# Patient Record
Sex: FEMALE | Race: BLACK OR AFRICAN AMERICAN | NOT HISPANIC OR LATINO | Employment: OTHER | URBAN - METROPOLITAN AREA
[De-identification: names, ages, dates, MRNs, and addresses within clinical notes are randomized per-mention and may not be internally consistent; named-entity substitution may affect disease eponyms.]

---

## 2017-05-19 ENCOUNTER — HOSPITAL ENCOUNTER (OUTPATIENT)
Dept: CARDIOLOGY | Facility: OTHER | Age: 67
Discharge: HOME | End: 2017-05-19
Attending: FAMILY MEDICINE
Payer: COMMERCIAL

## 2017-05-19 ENCOUNTER — OFFICE VISIT (OUTPATIENT)
Dept: INTERNAL MEDICINE | Facility: CLINIC | Age: 67
End: 2017-05-19
Attending: FAMILY MEDICINE
Payer: COMMERCIAL

## 2017-05-19 ENCOUNTER — PATIENT MESSAGE (OUTPATIENT)
Dept: INTERNAL MEDICINE | Facility: CLINIC | Age: 67
End: 2017-05-19

## 2017-05-19 VITALS
HEART RATE: 79 BPM | OXYGEN SATURATION: 97 % | WEIGHT: 187.63 LBS | SYSTOLIC BLOOD PRESSURE: 118 MMHG | DIASTOLIC BLOOD PRESSURE: 60 MMHG | BODY MASS INDEX: 32.2 KG/M2

## 2017-05-19 DIAGNOSIS — R07.9 CHEST PAIN, UNSPECIFIED TYPE: ICD-10-CM

## 2017-05-19 DIAGNOSIS — E11.9 CONTROLLED TYPE 2 DIABETES MELLITUS WITHOUT COMPLICATION, WITHOUT LONG-TERM CURRENT USE OF INSULIN: Primary | ICD-10-CM

## 2017-05-19 DIAGNOSIS — E66.9 OBESITY, UNSPECIFIED OBESITY SEVERITY, UNSPECIFIED OBESITY TYPE: ICD-10-CM

## 2017-05-19 DIAGNOSIS — I10 ESSENTIAL HYPERTENSION: ICD-10-CM

## 2017-05-19 DIAGNOSIS — E11.9 CONTROLLED TYPE 2 DIABETES MELLITUS WITHOUT COMPLICATION, WITHOUT LONG-TERM CURRENT USE OF INSULIN: ICD-10-CM

## 2017-05-19 PROCEDURE — 99214 OFFICE O/P EST MOD 30 MIN: CPT | Mod: S$GLB,,, | Performed by: FAMILY MEDICINE

## 2017-05-19 PROCEDURE — 3074F SYST BP LT 130 MM HG: CPT | Mod: S$GLB,,, | Performed by: FAMILY MEDICINE

## 2017-05-19 PROCEDURE — 93010 ELECTROCARDIOGRAM REPORT: CPT | Mod: ,,, | Performed by: INTERNAL MEDICINE

## 2017-05-19 PROCEDURE — 99999 PR PBB SHADOW E&M-EST. PATIENT-LVL III: CPT | Mod: PBBFAC,,, | Performed by: FAMILY MEDICINE

## 2017-05-19 PROCEDURE — 4010F ACE/ARB THERAPY RXD/TAKEN: CPT | Mod: S$GLB,,, | Performed by: FAMILY MEDICINE

## 2017-05-19 PROCEDURE — 3046F HEMOGLOBIN A1C LEVEL >9.0%: CPT | Mod: 8P,S$GLB,, | Performed by: FAMILY MEDICINE

## 2017-05-19 PROCEDURE — 1160F RVW MEDS BY RX/DR IN RCRD: CPT | Mod: S$GLB,,, | Performed by: FAMILY MEDICINE

## 2017-05-19 PROCEDURE — 93005 ELECTROCARDIOGRAM TRACING: CPT

## 2017-05-19 PROCEDURE — 3078F DIAST BP <80 MM HG: CPT | Mod: S$GLB,,, | Performed by: FAMILY MEDICINE

## 2017-05-19 NOTE — PROGRESS NOTES
Subjective:      Patient ID: Jennifer Motley is a 66 y.o. female.    Chief Complaint: Chest Pain (x 1 week) and Anxiety    HPI Comments: She is here with HTN, DM with chest pain and has been present for one week. It is an intermittent numb pain, it occurs in the afternoon and it does not radiate anywhere, it lasts a few minutes. No inciting or relieving factors. She denies nausea, vomiting, diarrhea or constipation with it. She does have a history of reflux and no burning or reflux symptoms. She is taking her BP medication consistently. Her mom and  currently recovering from surgery. She is allergic to tomato sauce. She does eat a lot of hot sauce. She has had labs with her endocrinologist one month ago.     Chest Pain      Anxiety   Symptoms include chest pain.         Review of Systems   Cardiovascular: Positive for chest pain.     I personally reviewed Past Medical History, Past Surgical history,  Past Social History and Family History    Objective:   /60 (BP Location: Right arm, Patient Position: Sitting, BP Method: Manual)  Pulse 79  Wt 85.1 kg (187 lb 9.8 oz)  SpO2 97%  BMI 32.2 kg/m2    Physical Exam   Constitutional: She is oriented to person, place, and time. She appears well-developed and well-nourished. No distress.   HENT:   Head: Normocephalic.   Right Ear: External ear normal.   Left Ear: External ear normal.   Mouth/Throat: Oropharynx is clear and moist.   Eyes: Conjunctivae and EOM are normal. Pupils are equal, round, and reactive to light. Right eye exhibits no discharge. Left eye exhibits no discharge. No scleral icterus.   Neck: Normal range of motion. No tracheal deviation present. No thyromegaly present.   Cardiovascular: Normal rate, regular rhythm, normal heart sounds and intact distal pulses.  Exam reveals no gallop.    No murmur heard.  Pulmonary/Chest: Effort normal and breath sounds normal. No respiratory distress. She has no wheezes. She has no rales. She exhibits no  tenderness.   Abdominal: Soft. Bowel sounds are normal. She exhibits no distension and no mass. There is no tenderness. There is no rebound and no guarding.   Musculoskeletal: Normal range of motion.   Neurological: She is alert and oriented to person, place, and time.   Skin: Skin is warm and dry.   Psychiatric: She has a normal mood and affect. Her behavior is normal. Judgment and thought content normal.   Vitals reviewed.      Jennifer was seen today for chest pain and anxiety.    Diagnoses and all orders for this visit:    Controlled type 2 diabetes mellitus without complication, without long-term current use of insulin  -   Will check hga1c and cont metformin     Obesity, unspecified obesity severity, unspecified obesity type  -discussed diet changes     Essential hypertension  -controlled, cont current medication     Chest pain, unspecified type  -patient declined counselor or medication  -will check stress test   -discussed ER prompts   -     SCHEDULED EKG 12-LEAD (to Muse); Future  -     Exercise stress echo; Future

## 2017-05-19 NOTE — MR AVS SNAPSHOT
[facility]  [address]  [place] [address]  Phone: [phone]  Fax: [phone]                  Jennifer Motley   2017 7:00 AM   Office Visit    Description:  Female : 1950   Provider:  Jaqueline Byrnes MD   Department:  [facility]           Reason for Visit     Chest Pain     Anxiety           Diagnoses this Visit        Comments    Controlled type 2 diabetes mellitus without complication, without long-term current use of insulin    -  Primary     Obesity, unspecified obesity severity, unspecified obesity type         Essential hypertension         Chest pain, unspecified type                To Do List           Future Appointments        Provider Department Dept Phone    2017 8:00 AM CARDIOLOGY, TESTS [facility] [phone]    2017 7:45 AM EXERCISE, STRESS ECHO [facility] - Echo/Stress Lab [phone]      Goals (5 Years of Data)     None      [facility]     [facility] Nurse Care Line -  Assistance  Unless otherwise directed by your provider, please contact [facility], our nurse care line that is available for  assistance.     Registered nurses in the [facility] provide: appointment scheduling, clinical advisement, health education, and other advisory services.  Call: [phone] (toll free)               Medications           Message regarding Medications     Verify the changes and/or additions to your medication regime listed below are the same as discussed with your clinician today.  If any of these changes or additions are incorrect, please notify your healthcare provider.             Verify that the below list of medications is an accurate representation of the medications you are currently taking.  If none reported, the list may be blank. If incorrect, please contact your healthcare provider. Carry this list with you in case of emergency.           Current  Medications     aspirin 81 MG Chew Take 81 mg by mouth once daily.    calcium-vitamin D3 500 mg(1,250mg) -200 unit per tablet Take 1 tablet by mouth once daily.    cinnamon bark 500 mg capsule Take 500 mg by mouth once daily.    CRANBERRY FRUIT CONCENTRATE (AZO CRANBERRY ORAL) Take by mouth.    dorzolamide-timolol 2-0.5% (COSOPT) 22.3-6.8 mg/mL ophthalmic solution     FLAXSEED ORAL Take 1 tablet by mouth.    latanoprost 0.005 % ophthalmic solution     lisinopril-hydrochlorothiazide (PRINZIDE,ZESTORETIC) 10-12.5 mg per tablet     metformin (GLUCOPHAGE) 500 MG tablet     multivitamin capsule Take 1 capsule by mouth once daily.    trospium (SANCTURA XR) 60 mg Cp24 capsule            Clinical Reference Information           Your Vitals Were     BP Pulse Weight SpO2 BMI    118/60 (BP Location: Right arm, Patient Position: Sitting, BP Method: Manual) 79 85.1 kg (187 lb 9.8 oz) 97% 32.2 kg/m2      Blood Pressure          Most Recent Value    BP  118/60      Allergies as of 5/19/2017     No Known Allergies      Immunizations Administered on Date of Encounter - 5/19/2017     None      Orders Placed During Today's Visit     Future Labs/Procedures Expected by Expires    Exercise stress echo  As directed 5/19/2018    SCHEDULED EKG 12-LEAD (to Muse)  As directed 5/19/2018      Instructions    Your test results will be communicated to you via: [facility], Telephone or Letter.  If you have not received your test results within one week. Please contact the clinic.         Language Assistance Services     ATTENTION: Language assistance services are available, free of charge. Please call [phone].      ATENCIÓN: Si habla español, tiene a cardoso disposición servicios gratuitos de asistencia lingüística. Llame al [phone].     CHÚ Ý: N?u b?n nói Ti?ng Vi?t, có các d?ch v? h? tr? ngôn ng? mi?n phí dành cho b?n. G?i s? [phone].         [facility] complies with applicable Federal civil rights laws and does  not discriminate on the basis of race, color, national origin, age, disability, or sex.

## 2017-05-23 ENCOUNTER — PATIENT MESSAGE (OUTPATIENT)
Dept: INTERNAL MEDICINE | Facility: CLINIC | Age: 67
End: 2017-05-23

## 2017-05-23 DIAGNOSIS — E11.9 TYPE 2 DIABETES MELLITUS WITHOUT COMPLICATION: ICD-10-CM

## 2017-05-26 ENCOUNTER — HOSPITAL ENCOUNTER (OUTPATIENT)
Dept: CARDIOLOGY | Facility: CLINIC | Age: 67
Discharge: HOME | End: 2017-05-26
Payer: COMMERCIAL

## 2017-05-26 ENCOUNTER — TELEPHONE (OUTPATIENT)
Dept: INTERNAL MEDICINE | Facility: CLINIC | Age: 67
End: 2017-05-26

## 2017-05-26 DIAGNOSIS — E11.9 CONTROLLED TYPE 2 DIABETES MELLITUS WITHOUT COMPLICATION, WITHOUT LONG-TERM CURRENT USE OF INSULIN: ICD-10-CM

## 2017-05-26 DIAGNOSIS — R07.9 CHEST PAIN, UNSPECIFIED TYPE: Primary | ICD-10-CM

## 2017-05-26 DIAGNOSIS — R07.9 CHEST PAIN, UNSPECIFIED TYPE: ICD-10-CM

## 2017-05-26 DIAGNOSIS — E66.9 OBESITY, UNSPECIFIED OBESITY SEVERITY, UNSPECIFIED OBESITY TYPE: ICD-10-CM

## 2017-05-26 DIAGNOSIS — I10 ESSENTIAL HYPERTENSION: ICD-10-CM

## 2017-05-26 LAB
DIASTOLIC DYSFUNCTION: NO
RETIRED EF AND QEF - SEE NOTES: 60 (ref 55–65)

## 2017-05-26 PROCEDURE — 93351 STRESS TTE COMPLETE: CPT | Mod: S$GLB,,, | Performed by: INTERNAL MEDICINE

## 2017-05-26 PROCEDURE — 93321 DOPPLER ECHO F-UP/LMTD STD: CPT | Mod: S$GLB,,, | Performed by: INTERNAL MEDICINE

## 2017-05-26 NOTE — TELEPHONE ENCOUNTER
Informed pt of lab results and advise. Cardio appt has been made. Pt demonstrated verbal understanding of information and had no further questions or concerns at this time.

## 2017-06-07 ENCOUNTER — PATIENT MESSAGE (OUTPATIENT)
Dept: INTERNAL MEDICINE | Facility: CLINIC | Age: 67
End: 2017-06-07

## 2017-06-08 ENCOUNTER — TELEPHONE (OUTPATIENT)
Dept: INTERNAL MEDICINE | Facility: CLINIC | Age: 67
End: 2017-06-08

## 2017-06-08 DIAGNOSIS — R07.9 CHEST PAIN, UNSPECIFIED TYPE: Primary | ICD-10-CM

## 2017-06-09 NOTE — TELEPHONE ENCOUNTER
pts cardio appt has been created. Pt demonstrated verbal understanding of information and had no further questions or concerns at this time.

## 2017-06-19 ENCOUNTER — OFFICE VISIT (OUTPATIENT)
Dept: CARDIOLOGY | Facility: CLINIC | Age: 67
End: 2017-06-19
Payer: COMMERCIAL

## 2017-06-19 VITALS
WEIGHT: 189.63 LBS | SYSTOLIC BLOOD PRESSURE: 119 MMHG | HEART RATE: 63 BPM | HEIGHT: 64 IN | DIASTOLIC BLOOD PRESSURE: 59 MMHG | BODY MASS INDEX: 32.37 KG/M2

## 2017-06-19 DIAGNOSIS — I10 ESSENTIAL HYPERTENSION: ICD-10-CM

## 2017-06-19 DIAGNOSIS — E66.9 OBESITY, UNSPECIFIED OBESITY SEVERITY, UNSPECIFIED OBESITY TYPE: ICD-10-CM

## 2017-06-19 DIAGNOSIS — R07.9 CHEST PAIN, UNSPECIFIED TYPE: Primary | ICD-10-CM

## 2017-06-19 DIAGNOSIS — E11.9 CONTROLLED TYPE 2 DIABETES MELLITUS WITHOUT COMPLICATION, WITHOUT LONG-TERM CURRENT USE OF INSULIN: ICD-10-CM

## 2017-06-19 PROCEDURE — 99999 PR PBB SHADOW E&M-EST. PATIENT-LVL IV: CPT | Mod: PBBFAC,,, | Performed by: INTERNAL MEDICINE

## 2017-06-19 PROCEDURE — 4010F ACE/ARB THERAPY RXD/TAKEN: CPT | Mod: S$GLB,,, | Performed by: INTERNAL MEDICINE

## 2017-06-19 PROCEDURE — 99204 OFFICE O/P NEW MOD 45 MIN: CPT | Mod: S$GLB,,, | Performed by: INTERNAL MEDICINE

## 2017-06-19 NOTE — PROGRESS NOTES
"Subjective:   Patient ID:  Jennifer Motley is a 66 y.o. female is a new patient who presents for evaluation of Chest pain, unspecified type  HPI:   She is here with HTN, DM with chest pain and has been present for one week. It is an intermittent numb pain, it occurs in the afternoon and it does not radiate anywhere, it lasts a few minutes. No inciting or relieving factors. She denies nausea, vomiting, diarrhea or constipation with it. She does have a history of reflux and no burning or reflux symptoms. She is taking her BP medication consistently. Her mom and  currently recovering from surgery. She is allergic to tomato sauce. She does eat a lot of hot sauce. She has had labs with her endocrinologist one month ago.      Non smoker. Mother had HTN and high BP. Aunts have had MI at 50s and 60s.   Exercise stress echo  1. The EKG portion of this study is negative for ischemia at a high workload, and peak heart rate of 157 bpm (106% of predicted).   2. Blood pressure response to exercise was normal (Presenting BP: 140/57 Peak BP: 217/84).   3. No significant arrhythmias were present.   4. There were no symptoms of chest discomfort or significant dyspnea throughout the protocol.   5. The Khoury treadmill score was 3 suggesting an intermediate probability for future cardiovascular events.    1 - Normal left ventricular systolic function (EF 60-65%).     2 - Normal left ventricular diastolic function.     3 - Normal right ventricular systolic function .     No evidence of stress induced myocardial ischemia.     Patient Active Problem List   Diagnosis    Thyroid cyst    Overactive bladder    Ocular hypertension    Obese    Hypertension    Controlled type 2 diabetes mellitus without complication, without long-term current use of insulin     BP (!) 119/59 (BP Location: Left arm, Patient Position: Sitting, BP Method: Automatic)   Pulse 63   Ht 5' 4" (1.626 m)   Wt 86 kg (189 lb 9.5 oz)   BMI 32.54 kg/m²   Body " mass index is 32.54 kg/m².  CrCl cannot be calculated (No order found.).    No results found for: NA, K, CL, CO2, BUN, CREATININE, GLU, HGBA1C, MG, AST, ALT, ALBUMIN, PROT, BILITOT, WBC, HGB, HCT, MCV, PLT, INR, PSA, TSH, CHOL, HDL, LDLCALC, TRIG    Current Outpatient Prescriptions   Medication Sig    aspirin 81 MG Chew Take 81 mg by mouth once daily.    calcium-vitamin D3 500 mg(1,250mg) -200 unit per tablet Take 1 tablet by mouth once daily.    cinnamon bark 500 mg capsule Take 500 mg by mouth once daily.    CRANBERRY FRUIT CONCENTRATE (AZO CRANBERRY ORAL) Take by mouth once daily at 6am.     dorzolamide-timolol 2-0.5% (COSOPT) 22.3-6.8 mg/mL ophthalmic solution Place 1 drop into both eyes 2 (two) times daily.     FLAXSEED ORAL Take 1 tablet by mouth once daily at 6am.     latanoprost 0.005 % ophthalmic solution Place 1 drop into both eyes once daily.     lisinopril-hydrochlorothiazide (PRINZIDE,ZESTORETIC) 10-12.5 mg per tablet Take 1 tablet by mouth once daily.     metformin (GLUCOPHAGE) 500 MG tablet Take 500 mg by mouth daily with breakfast.     multivitamin capsule Take 1 capsule by mouth once daily.    trospium (SANCTURA XR) 60 mg Cp24 capsule 60 mg once daily.      No current facility-administered medications for this visit.        Review of Systems   Constitution: Negative for chills, decreased appetite, weakness, malaise/fatigue, night sweats and weight loss.   Eyes: Negative for blurred vision, double vision, visual disturbance and visual halos.   Cardiovascular: Positive for chest pain. Negative for claudication, cyanosis, dyspnea on exertion, irregular heartbeat, leg swelling, near-syncope, orthopnea, palpitations, paroxysmal nocturnal dyspnea and syncope.   Respiratory: Negative for cough, hemoptysis, snoring, sputum production and wheezing.    Endocrine: Negative for cold intolerance, heat intolerance, polydipsia and polyphagia.   Hematologic/Lymphatic: Negative for adenopathy and bleeding  problem. Does not bruise/bleed easily.   Skin: Negative for flushing, itching, poor wound healing and rash.   Musculoskeletal: Positive for arthritis and back pain. Negative for falls, gout, joint pain, joint swelling, muscle cramps, muscle weakness, myalgias, neck pain and stiffness.   Gastrointestinal: Negative for bloating, abdominal pain, anorexia, diarrhea, dysphagia, excessive appetite, flatus, hematemesis, jaundice, melena and nausea.   Genitourinary: Negative for hesitancy and incomplete emptying.   Neurological: Negative for aphonia, brief paralysis, difficulty with concentration, disturbances in coordination, excessive daytime sleepiness, dizziness, focal weakness, light-headedness and loss of balance.   Psychiatric/Behavioral: Negative for altered mental status, depression, hallucinations, hypervigilance, memory loss, substance abuse and suicidal ideas. The patient does not have insomnia and is not nervous/anxious.        Objective:   Physical Exam   Constitutional: She is oriented to person, place, and time. She appears well-developed and well-nourished. No distress.   Patient looks well.   HENT:   Head: Normocephalic and atraumatic.   Nose: Nose normal.   Mouth/Throat: Oropharynx is clear and moist. No oropharyngeal exudate.   Eyes: Conjunctivae and EOM are normal. Pupils are equal, round, and reactive to light. Right eye exhibits no discharge. Left eye exhibits no discharge. No scleral icterus.   Neck: Normal range of motion. Neck supple. No JVD present. No tracheal deviation present. No thyromegaly present.   Cardiovascular: Normal rate, regular rhythm, normal heart sounds and intact distal pulses.  Exam reveals no gallop and no friction rub.    No murmur heard.  Pulmonary/Chest: Effort normal and breath sounds normal. No stridor. No respiratory distress. She has no wheezes. She has no rales. She exhibits no tenderness.   Abdominal: Soft. Bowel sounds are normal. She exhibits no distension and no  mass. There is no tenderness. There is no rebound and no guarding.   Musculoskeletal: Normal range of motion. She exhibits no edema or tenderness.   Lymphadenopathy:     She has no cervical adenopathy.   Neurological: She is alert and oriented to person, place, and time. She has normal reflexes. No cranial nerve deficit. She exhibits normal muscle tone. Coordination normal.   Skin: Skin is warm. No rash noted. She is not diaphoretic. No erythema. No pallor.   Psychiatric: She has a normal mood and affect. Her behavior is normal. Judgment and thought content normal.       Assessment:     1. Chest pain, unspecified type    2. Controlled type 2 diabetes mellitus without complication, without long-term current use of insulin    3. Obesity, unspecified obesity severity, unspecified obesity type    4. Essential hypertension        Plan:   ASCVD risk - 7%. Recommend statin but patient wants to do dietary modification for now.   We discussed the possibility of episodes of high blood pressure an an etiology of her Chest pain and that she needs to keep a BP diary.- at peak stress BP > 200 systolic.   Counseled on importance of heart healthy diet low in saturated and trans fat and salt as well gradually starting a regular aerobic exercise regimen with goal of 30min 5x/week. Recommend BP diary. Call if systolic BP > 140 mmHg on checking repeatedly  All meds reviewed and are appropriate  Patient is compliant with his medications.    RTC PRN.

## 2017-06-19 NOTE — LETTER
June 19, 2017      Jaqueline Byrnes MD  [address]  [facility] [address]           [facility]  [address]  [place] [address]  Phone: [phone]          Patient: Jennifer Motley   MR Number: [id number]   YOB: 1950   Date of Visit: 6/19/2017       Dear Dr. Jaqueline Byrnes:    Thank you for referring Jennifer Motley to me for evaluation. Attached you will find relevant portions of my assessment and plan of care.    If you have questions, please do not hesitate to call me. I look forward to following Jennifer Motley along with you.    Sincerely,    Verónica Turk MD    Enclosure  CC:  No Recipients    If you would like to receive this communication electronically, please contact [email or web address] or [phone] to request more information on [employer] [facility] access.    For providers and/or their staff who would like to refer a patient to [facility], please contact us through our one-stop-shop provider referral line, [facility], at [phone].    If you feel you have received this communication in error or would no longer like to receive these types of communications, please e-mail [email or web address]

## 2017-07-10 DIAGNOSIS — Z12.11 COLON CANCER SCREENING: ICD-10-CM

## 2017-07-24 ENCOUNTER — PATIENT MESSAGE (OUTPATIENT)
Dept: INTERNAL MEDICINE | Facility: CLINIC | Age: 67
End: 2017-07-24

## 2017-10-20 DIAGNOSIS — Z12.11 COLON CANCER SCREENING: ICD-10-CM

## 2017-10-20 DIAGNOSIS — E11.9 TYPE 2 DIABETES MELLITUS WITHOUT COMPLICATION: ICD-10-CM

## 2017-12-01 ENCOUNTER — PATIENT MESSAGE (OUTPATIENT)
Dept: INTERNAL MEDICINE | Facility: CLINIC | Age: 67
End: 2017-12-01

## 2018-02-02 DIAGNOSIS — E11.9 TYPE 2 DIABETES MELLITUS WITHOUT COMPLICATION: ICD-10-CM

## 2018-04-03 ENCOUNTER — PATIENT OUTREACH (OUTPATIENT)
Dept: INTERNAL MEDICINE | Facility: CLINIC | Age: 68
End: 2018-04-03

## 2018-04-03 NOTE — PROGRESS NOTES
Patient was contacted to scheduled visit with Jaqueline Byrnes MD. Attempt unsuccessful,will follow up with patient at a later time.     Left message for patient to contact office to schedule appointment for DM f/u.  Letter will be mailed in efforts to contact patient.

## 2018-04-29 ENCOUNTER — TELEPHONE (OUTPATIENT)
Dept: INTERNAL MEDICINE | Facility: CLINIC | Age: 68
End: 2018-04-29

## 2018-04-30 ENCOUNTER — DOCUMENTATION ONLY (OUTPATIENT)
Dept: INTERNAL MEDICINE | Facility: CLINIC | Age: 68
End: 2018-04-30

## 2018-04-30 NOTE — TELEPHONE ENCOUNTER
Pt has been informed of advice.  States verbal understanding. Scheduled accordingly. Pt states she had annual labs completed last month and will fax results to office. Fax number provided. Informed pt FIT kit supplies and information will be sent to address on file. Patient has no further questions or concerns.

## 2018-05-08 ENCOUNTER — LAB VISIT (OUTPATIENT)
Dept: LAB | Facility: HOSPITAL | Age: 68
End: 2018-05-08
Attending: FAMILY MEDICINE
Payer: COMMERCIAL

## 2018-05-08 ENCOUNTER — PATIENT OUTREACH (OUTPATIENT)
Dept: ADMINISTRATIVE | Facility: HOSPITAL | Age: 68
End: 2018-05-08

## 2018-05-08 DIAGNOSIS — Z12.11 COLON CANCER SCREENING: ICD-10-CM

## 2018-05-08 PROCEDURE — 82274 ASSAY TEST FOR BLOOD FECAL: CPT

## 2018-05-08 NOTE — PROGRESS NOTES
[facility] is committed to your overall health.  To help you get the most out of each of your visits, we will review your information to make sure you are up to date on all of your recommended tests and/or procedures.       Your PCP  Jaqueline Byrnes MD   found that you may be due for:       Health Maintenance Due   Topic Date Due    Hepatitis C Screening  1950    Hemoglobin A1c  1950    Fecal Occult Blood Test (FOBT)/FitKit  1950    Eye Exam  11/15/1961    Low Dose Statin  11/15/1972    Zoster Vaccine  11/15/2011    Lipid Panel  10/01/2017    Foot Exam  11/21/2017    Pneumococcal (65+) (2 of 2 - PPSV23) 12/02/2017    DEXA SCAN  06/01/2018    Mammogram  07/01/2018     You will be scheduled for a eye cam retina scan on the same day of your scheduled visit with your Jaqueline Byrnes MD.  NO eye drop dilation will take place. You can drive after the scan.  This will take no longer than ten minutes. This will take place in the same office area as your visit. This eye scan is NOT a visual exam. This exam is being used to scan for diseases of the eye such as Diabetic Retinopathy which is the leading cause for blindness in those that have Diabetes Mellitus.     If you feel you need a vision exam please contact the office to schedule an appointment with Opthalmology.     If you see an outside eye physician please be prepared to sign a release of information so that we may request your records to update your medical records. Thank you.           If you have had any of the above done at another facility, please bring the records or information with you so that your record at [facility] will be complete.  If you would like to schedule any of these, please contact me.     If you are currently taking medication, please bring it with you to your appointment for review.     Also, if you have any type of Advanced Directives, please bring them with you to your office visit so we may scan them into your  chart.       Thank you for Choosing [facility] for your healthcare needs.        Additional Information  If you have questions, you can email [email or web address] or call [phone]  to talk to our [facility] staff. Remember, [facility] is NOT to be used for urgent needs. For medical emergencies, dial 911.

## 2018-05-09 LAB — HEMOCCULT STL QL IA: NEGATIVE

## 2018-05-22 ENCOUNTER — OFFICE VISIT (OUTPATIENT)
Dept: INTERNAL MEDICINE | Facility: CLINIC | Age: 68
End: 2018-05-22
Attending: FAMILY MEDICINE
Payer: COMMERCIAL

## 2018-05-22 VITALS
SYSTOLIC BLOOD PRESSURE: 128 MMHG | BODY MASS INDEX: 32.9 KG/M2 | OXYGEN SATURATION: 98 % | HEIGHT: 64 IN | HEART RATE: 61 BPM | WEIGHT: 192.69 LBS | DIASTOLIC BLOOD PRESSURE: 72 MMHG

## 2018-05-22 DIAGNOSIS — Z11.59 NEED FOR HEPATITIS C SCREENING TEST: ICD-10-CM

## 2018-05-22 DIAGNOSIS — Z00.00 ANNUAL PHYSICAL EXAM: Primary | ICD-10-CM

## 2018-05-22 DIAGNOSIS — E11.9 CONTROLLED TYPE 2 DIABETES MELLITUS WITHOUT COMPLICATION, WITHOUT LONG-TERM CURRENT USE OF INSULIN: ICD-10-CM

## 2018-05-22 DIAGNOSIS — N95.9 POSTMENOPAUSAL SYMPTOMS: ICD-10-CM

## 2018-05-22 DIAGNOSIS — E66.01 SEVERE OBESITY: ICD-10-CM

## 2018-05-22 DIAGNOSIS — Z13.220 ENCOUNTER FOR LIPID SCREENING FOR CARDIOVASCULAR DISEASE: ICD-10-CM

## 2018-05-22 DIAGNOSIS — Z12.39 SCREENING FOR BREAST CANCER: ICD-10-CM

## 2018-05-22 DIAGNOSIS — Z13.6 ENCOUNTER FOR LIPID SCREENING FOR CARDIOVASCULAR DISEASE: ICD-10-CM

## 2018-05-22 PROCEDURE — 99999 PR PBB SHADOW E&M-EST. PATIENT-LVL III: CPT | Mod: PBBFAC,,, | Performed by: FAMILY MEDICINE

## 2018-05-22 PROCEDURE — 99397 PER PM REEVAL EST PAT 65+ YR: CPT | Mod: S$GLB,,, | Performed by: FAMILY MEDICINE

## 2018-05-22 PROCEDURE — 3078F DIAST BP <80 MM HG: CPT | Mod: CPTII,S$GLB,, | Performed by: FAMILY MEDICINE

## 2018-05-22 PROCEDURE — 3074F SYST BP LT 130 MM HG: CPT | Mod: CPTII,S$GLB,, | Performed by: FAMILY MEDICINE

## 2018-05-22 RX ORDER — VITAMIN B COMPLEX
1 CAPSULE ORAL DAILY
COMMUNITY
End: 2022-10-17 | Stop reason: SDUPTHER

## 2018-05-22 NOTE — PROGRESS NOTES
"Subjective:      Patient ID: Jennifer Motley is a 67 y.o. female.    Chief Complaint: Annual Exam    She is here for annual exam. She reports she faxed in her hga1c and lipid panel. She reports hga1c is 6.2. She has not been exercising. She reports she is completing weight watchers.       Review of Systems   Constitutional: Negative.    HENT: Negative.    Respiratory: Negative.    Cardiovascular: Negative.    Gastrointestinal: Negative.    Genitourinary: Negative.    Neurological: Negative.      I personally reviewed Past Medical History, Past Surgical history,  Past Social History and Family History    Objective:   /72   Pulse 61   Ht 5' 4" (1.626 m)   Wt 87.4 kg (192 lb 10.9 oz)   SpO2 98%   BMI 33.07 kg/m²     Physical Exam   Constitutional: She is oriented to person, place, and time. She appears well-developed and well-nourished. No distress.   HENT:   Head: Normocephalic and atraumatic.   Right Ear: External ear normal.   Left Ear: External ear normal.   Mouth/Throat: Oropharynx is clear and moist.   Eyes: Conjunctivae and EOM are normal. Pupils are equal, round, and reactive to light. Right eye exhibits no discharge. Left eye exhibits no discharge. No scleral icterus.   Neck: Normal range of motion. Neck supple.   Cardiovascular: Normal rate, regular rhythm, normal heart sounds and intact distal pulses.  Exam reveals no gallop.    No murmur heard.  Pulses:       Dorsalis pedis pulses are 2+ on the right side, and 2+ on the left side.        Posterior tibial pulses are 2+ on the right side, and 2+ on the left side.   Pulmonary/Chest: Effort normal and breath sounds normal. No respiratory distress. She has no wheezes. She has no rales. She exhibits no tenderness.   Abdominal: Soft. Bowel sounds are normal. She exhibits no distension and no mass. There is no tenderness. There is no rebound and no guarding.   Musculoskeletal: Normal range of motion.        Right foot: There is normal range of motion and " no deformity.        Left foot: There is normal range of motion and no deformity.   Feet:   Right Foot:   Protective Sensation: 6 sites tested. 6 sites sensed.   Skin Integrity: Negative for ulcer or blister.   Left Foot:   Protective Sensation: 6 sites tested. 6 sites sensed.   Skin Integrity: Negative for ulcer or blister.   Neurological: She is alert and oriented to person, place, and time.   Skin: Skin is warm and dry.   Psychiatric: She has a normal mood and affect. Her behavior is normal. Judgment and thought content normal.   Vitals reviewed.      Jennifer was seen today for annual exam.    Diagnoses and all orders for this visit:    Annual physical exam  -patient to email labs    Controlled type 2 diabetes mellitus without complication, without long-term current use of insulin  -patient is holding metformin and has recheck ordered for 3 months     Need for hepatitis C screening test  -     Hepatitis C antibody    Encounter for lipid screening for cardiovascular disease  -patient declined statin and wants to work on diet and exericse    Postmenopausal symptoms  -declined dxa    Screening for breast cancer  - scheduled for touro    Severe obesity  -discussed dietary changes     Other orders  -     Cancel: Hemoglobin A1c; Future  -     Cancel: Diabetic Eye Screening Photo; Future  -     Cancel: Lipid panel; Future  -     Cancel: DXA Bone Density Spine And Hip; Future

## 2018-06-04 ENCOUNTER — PATIENT MESSAGE (OUTPATIENT)
Dept: INTERNAL MEDICINE | Facility: CLINIC | Age: 68
End: 2018-06-04

## 2018-08-03 ENCOUNTER — PATIENT MESSAGE (OUTPATIENT)
Dept: INTERNAL MEDICINE | Facility: CLINIC | Age: 68
End: 2018-08-03

## 2018-09-07 DIAGNOSIS — Z12.39 BREAST CANCER SCREENING: ICD-10-CM

## 2018-09-20 ENCOUNTER — PATIENT MESSAGE (OUTPATIENT)
Dept: INTERNAL MEDICINE | Facility: CLINIC | Age: 68
End: 2018-09-20

## 2018-12-28 DIAGNOSIS — E11.9 TYPE 2 DIABETES MELLITUS WITHOUT COMPLICATION: ICD-10-CM

## 2019-01-03 ENCOUNTER — OFFICE VISIT (OUTPATIENT)
Dept: INTERNAL MEDICINE | Facility: CLINIC | Age: 69
End: 2019-01-03
Attending: INTERNAL MEDICINE
Payer: COMMERCIAL

## 2019-01-03 VITALS
SYSTOLIC BLOOD PRESSURE: 138 MMHG | HEART RATE: 90 BPM | OXYGEN SATURATION: 96 % | TEMPERATURE: 99 F | WEIGHT: 193.31 LBS | HEIGHT: 64 IN | DIASTOLIC BLOOD PRESSURE: 60 MMHG | BODY MASS INDEX: 33 KG/M2

## 2019-01-03 DIAGNOSIS — E11.9 CONTROLLED TYPE 2 DIABETES MELLITUS WITHOUT COMPLICATION, WITHOUT LONG-TERM CURRENT USE OF INSULIN: ICD-10-CM

## 2019-01-03 DIAGNOSIS — H10.9 BACTERIAL CONJUNCTIVITIS OF BOTH EYES: Primary | ICD-10-CM

## 2019-01-03 DIAGNOSIS — J01.00 ACUTE NON-RECURRENT MAXILLARY SINUSITIS: ICD-10-CM

## 2019-01-03 DIAGNOSIS — B96.89 BACTERIAL CONJUNCTIVITIS OF BOTH EYES: Primary | ICD-10-CM

## 2019-01-03 PROCEDURE — 3078F DIAST BP <80 MM HG: CPT | Mod: CPTII,S$GLB,, | Performed by: INTERNAL MEDICINE

## 2019-01-03 PROCEDURE — 3078F PR MOST RECENT DIASTOLIC BLOOD PRESSURE < 80 MM HG: ICD-10-PCS | Mod: CPTII,S$GLB,, | Performed by: INTERNAL MEDICINE

## 2019-01-03 PROCEDURE — 1101F PR PT FALLS ASSESS DOC 0-1 FALLS W/OUT INJ PAST YR: ICD-10-PCS | Mod: CPTII,S$GLB,, | Performed by: INTERNAL MEDICINE

## 2019-01-03 PROCEDURE — 99999 PR PBB SHADOW E&M-EST. PATIENT-LVL IV: CPT | Mod: PBBFAC,,, | Performed by: INTERNAL MEDICINE

## 2019-01-03 PROCEDURE — 3075F SYST BP GE 130 - 139MM HG: CPT | Mod: CPTII,S$GLB,, | Performed by: INTERNAL MEDICINE

## 2019-01-03 PROCEDURE — 99214 OFFICE O/P EST MOD 30 MIN: CPT | Mod: S$GLB,,, | Performed by: INTERNAL MEDICINE

## 2019-01-03 PROCEDURE — 99214 PR OFFICE/OUTPT VISIT, EST, LEVL IV, 30-39 MIN: ICD-10-PCS | Mod: S$GLB,,, | Performed by: INTERNAL MEDICINE

## 2019-01-03 PROCEDURE — 99999 PR PBB SHADOW E&M-EST. PATIENT-LVL IV: ICD-10-PCS | Mod: PBBFAC,,, | Performed by: INTERNAL MEDICINE

## 2019-01-03 PROCEDURE — 3075F PR MOST RECENT SYSTOLIC BLOOD PRESS GE 130-139MM HG: ICD-10-PCS | Mod: CPTII,S$GLB,, | Performed by: INTERNAL MEDICINE

## 2019-01-03 PROCEDURE — 1101F PT FALLS ASSESS-DOCD LE1/YR: CPT | Mod: CPTII,S$GLB,, | Performed by: INTERNAL MEDICINE

## 2019-01-03 RX ORDER — OFLOXACIN 3 MG/ML
1 SOLUTION/ DROPS OPHTHALMIC 4 TIMES DAILY
Qty: 5 ML | Refills: 0 | Status: SHIPPED | OUTPATIENT
Start: 2019-01-03 | End: 2019-01-10

## 2019-01-03 NOTE — PATIENT INSTRUCTIONS
You have been diagnosed with a viral sinusitis. It is no longer thought to be wise to treat sinus infections with antibiotics in the early stages as they are usually viral and we are developing significant antibiotic resistance with this practice.  I recommend you take a decongestant, such as pseudophed, for any facial (sinus) pressure or pain.  Using a natasha pot (boil water first and cool to room temperature) can also improve symptoms.  Guaifenesin (Mucinex) can help loosen the mucous. Most infections will clear on their own in 7-10 days.        Conjunctivitis, Bacterial    You have an infection in the membranes covering the white part of the eye. This part of the eye is called the conjunctiva. The infection is called conjunctivitis. The most common symptoms of conjunctivitis include a thick, pus-like discharge from the eye, swollen eyelids, redness, eyelids sticking together upon awakening, and a gritty or scratchy feeling in the eye. Your infection was caused by bacteria. It may be treated with medicine. With treatment, the infection takes about 7 to 10 days to resolve.  Home care  · Use prescribed antibiotic eye drops or ointment as directed to treat the infection.  · Apply a warm compress (towel soaked in warm water) to the affected eye 3 to 4 times a day. Do this just before applying medicine to the eye.  · Use a warm, wet cloth to wipe away crusting of the eyelids in the morning. This is caused by mucus drainage during the night. You may also use saline irrigating solution or artificial tears to rinse away mucus in the eye. Do not put a patch over the eye.  · Wash your hands before and after touching the infected eye. This is to prevent spreading the infection to the other eye, and to other people. Do not share your towels or washcloths with others.  · You may use acetaminophen or ibuprofen to control pain, unless another medicine was prescribed. (Note: If you have chronic liver or kidney disease or have  ever had a stomach ulcer or gastrointestinal bleeding, talk with your doctor before using these medicines.)  · Do not wear contact lenses until your eyes have healed and all symptoms are gone.  Follow-up care  Follow up with your healthcare provider, or as advised.  When to seek medical advice  Call your healthcare provider right away if any of these occur:  · Worsening vision  · Increasing pain in the eye  · Increasing swelling or redness of the eyelid  · Redness spreading around the eye  Date Last Reviewed: 6/14/2015 © 2000-2017 [employer] [address] [place] [address]. All rights reserved. This information is not intended as a substitute for professional medical care. Always follow your healthcare professional's instructions.        Sinusitis (No Antibiotics)    The sinuses are air-filled spaces within the bones of the face. They connect to the inside of the nose. Sinusitis is an inflammation of the tissue lining the sinus cavity. Sinus inflammation can occur during a cold. It can also be due to allergies to pollens and other particles in the air. It can cause symptoms such as sinus congestion, headache, sore throat, facial swelling and fullness. It may also cause a low-grade fever. No infection is present, and no antibiotic treatment is needed.  Home care  · Drink plenty of water, hot tea, and other liquids. This may help thin mucus. It also may promote sinus drainage.  · Heat may help soothe painful areas of the face. Use a towel soaked in hot water. Or,  the shower and direct the hot spray onto your face. Using a vaporizer along with a menthol rub at night may also help.   · An expectorant containing guaifenesin may help thin the mucus and promote drainage from the sinuses.  · Over-the-counter decongestants may be used unless a similar medicine was prescribed. Nasal sprays work the fastest. Use one that contains phenylephrine or oxymetazoline. First blow the nose gently.  Then use the spray. Do not use these medicines more often than directed on the label or symptoms may get worse. You may also use tablets containing pseudoephedrine. Avoid products that combine ingredients, because side effects may be increased. Read labels. You can also ask the pharmacist for help. (NOTE: Persons with high blood pressure should not use decongestants. They can raise blood pressure.)  · Over-the-counter antihistamines may help if allergies contributed to your sinusitis.    · Use acetaminophen or ibuprofen to control pain, unless another pain medicine was prescribed. (If you have chronic liver or kidney disease or ever had a stomach ulcer, talk with your doctor before using these medicines. Aspirin should never be used in anyone under 18 years of age who is ill with a fever. It may cause severe liver damage.)  · Use nasal rinses or irrigation as instructed by your health care provider.  · Don't smoke. This can worsen symptoms.  Follow-up care  Follow up with your healthcare provider or our staff if you are not improving within the next week.  When to seek medical advice  Call your healthcare provider if any of these occur:  · Green or yellow discharge from the nose or into the throat  · Facial pain or headache becoming more severe  · Stiff neck  · Unusual drowsiness or confusion  · Swelling of the forehead or eyelids  · Vision problems, including blurred or double vision  · Fever of 100.4ºF (38ºC) or higher, or as directed by your healthcare provider  · Seizure  · Breathing problems  · Symptoms not resolving within 10 days  Date Last Reviewed: 4/13/2015  © 1717-2432 [employer]. [address] [place] [address], [place] [address]. All rights reserved. This information is not intended as a substitute for professional medical care. Always follow your healthcare professional's instructions.

## 2019-01-03 NOTE — PROGRESS NOTES
Subjective:       Patient ID: Jennifer Motley is a 68 y.o. female.    Chief Complaint: Cough; Sore Throat; and Sinusitis     Jennifer Motley is a 68 y.o.  female who presents for Cough; Sore Throat; and Sinusitis  .  Pt complaint of ST, sinus pain and pressure x two days  Also with bilateral red eyes, white discharge and crusting reoccurs throughout the day.       Sinusitis   This is a new problem. The current episode started yesterday. The problem has been gradually worsening since onset. There has been no fever. Her pain is at a severity of 6/10. The pain is moderate. Associated symptoms include congestion, headaches, a hoarse voice, sinus pressure and a sore throat. Pertinent negatives include no chills, coughing, shortness of breath or swollen glands. Past treatments include oral decongestants. The treatment provided significant relief.     Review of Systems   Constitutional: Negative for chills and fever.   HENT: Positive for congestion, hoarse voice, sinus pressure and sore throat.         Eye discharge bilaterally   Respiratory: Negative for cough, shortness of breath and wheezing.    Gastrointestinal: Negative for abdominal pain and constipation.   Skin: Negative for color change and rash.   Neurological: Positive for headaches. Negative for dizziness.       Patient Active Problem List   Diagnosis    Thyroid cyst    Overactive bladder    Ocular hypertension    Obese    Hypertension    Controlled type 2 diabetes mellitus without complication, without long-term current use of insulin       Past Medical History:   Diagnosis Date    Diabetes mellitus type II, controlled     Hypertension     Obese     Ocular hypertension     Overactive bladder     Thyroid cyst        Past Surgical History:   Procedure Laterality Date    none         Family History   Problem Relation Age of Onset    Hypertension Mother     Stroke Mother     Glaucoma Mother     Diabetes Father     Coronary artery disease Sister         " CABG x 3     Heart attack Sister     Dementia Sister     Stroke Brother     Heart attack Brother         CABG     Coronary artery disease Brother     Diabetes Sister        Social History     Tobacco Use    Smoking status: Never Smoker    Smokeless tobacco: Never Used   Substance Use Topics    Alcohol use: Yes     Alcohol/week: 1.2 - 1.8 oz     Types: 2 - 3 Standard drinks or equivalent per week    Drug use: No       Objective:   Blood pressure 138/60, pulse 90, temperature 98.8 °F (37.1 °C), height 5' 4" (1.626 m), weight 87.7 kg (193 lb 5.5 oz), SpO2 96 %.     Physical Exam   Constitutional: She is oriented to person, place, and time.   HENT:   Head: Normocephalic and atraumatic.   Right Ear: Tympanic membrane and ear canal normal.   Left Ear: Tympanic membrane and ear canal normal.   Mouth/Throat: Uvula is midline and mucous membranes are normal. Posterior oropharyngeal erythema present. No oropharyngeal exudate.   Eyes: Right eye exhibits no discharge and no exudate. Left eye exhibits no discharge and no exudate. Right conjunctiva is injected. Left conjunctiva is injected.   Cardiovascular: Normal rate and regular rhythm. Exam reveals no gallop and no friction rub.   No murmur heard.  Pulmonary/Chest: Effort normal and breath sounds normal. She has no wheezes. She has no rales.   Abdominal: Soft. Bowel sounds are normal.   Lymphadenopathy:        Head (right side): No submental and no submandibular adenopathy present.        Head (left side): No submental and no submandibular adenopathy present.     She has no cervical adenopathy.   Neurological: She is alert and oriented to person, place, and time.   Skin: Skin is warm and dry.   Psychiatric: She has a normal mood and affect. Thought content normal.       Prior labs reviewed  Assessment/Plan:        Jennifer was seen today for cough, sore throat and sinusitis.    Diagnoses and all orders for this visit:    Bacterial conjunctivitis of both eyes  Other " orders  -     ofloxacin (OCUFLOX) 0.3 % ophthalmic solution; Place 1 drop into both eyes 4 (four) times daily. for 7 days  Handout given  Suspect viral but history consistent with bacterial infection so will treat    Acute non-recurrent maxillary sinusitis  otc decongestant  natasha pot  Nsaids, tylenol or salt water gargle for ST pain    DM  Follow up with pcp  Overdue to hba1c       Medication List           Accurate as of 1/3/19  3:06 PM. If you have any questions, ask your nurse or doctor.               START taking these medications    ofloxacin 0.3 % ophthalmic solution  Commonly known as:  OCUFLOX  Place 1 drop into both eyes 4 (four) times daily. for 7 days  Started by:  Maria Eugenia Torres MD        CONTINUE taking these medications    aspirin 81 MG Chew     AZO CRANBERRY ORAL     b complex vitamins capsule     calcium-vitamin D3 500 mg(1,250mg) -200 unit per tablet  Commonly known as:  OS-ARTURO 500 + D3     cinnamon bark 500 mg capsule     dorzolamide-timolol 2-0.5% 22.3-6.8 mg/mL ophthalmic solution  Commonly known as:  COSOPT     FLAXSEED ORAL     FLUARIX QUAD 0832-9076 (PF) 60 mcg (15 mcg x 4)/0.5 mL Syrg vaccine  Generic drug:  influenza     latanoprost 0.005 % ophthalmic solution     lisinopril-hydrochlorothiazide 10-12.5 mg per tablet  Commonly known as:  PRINZIDE,ZESTORETIC     multivitamin capsule     trospium 60 mg Cp24 capsule  Commonly known as:  SANCTURA XR           Where to Get Your Medications      These medications were sent to [facility] [place] - [address]  [address] [place] [address] [address]    Phone:  [phone]   · ofloxacin 0.3 % ophthalmic solution

## 2019-03-04 ENCOUNTER — OFFICE VISIT (OUTPATIENT)
Dept: URGENT CARE | Facility: CLINIC | Age: 69
End: 2019-03-04
Payer: COMMERCIAL

## 2019-03-04 VITALS
BODY MASS INDEX: 32.95 KG/M2 | OXYGEN SATURATION: 98 % | RESPIRATION RATE: 18 BRPM | WEIGHT: 193 LBS | HEIGHT: 64 IN | SYSTOLIC BLOOD PRESSURE: 151 MMHG | TEMPERATURE: 99 F | DIASTOLIC BLOOD PRESSURE: 60 MMHG | HEART RATE: 97 BPM

## 2019-03-04 DIAGNOSIS — H66.92 ACUTE BACTERIAL INFECTION OF LEFT MIDDLE EAR: Primary | ICD-10-CM

## 2019-03-04 LAB
CTP QC/QA: YES
S PYO RRNA THROAT QL PROBE: NEGATIVE

## 2019-03-04 PROCEDURE — 3077F SYST BP >= 140 MM HG: CPT | Mod: CPTII,S$GLB,, | Performed by: PHYSICIAN ASSISTANT

## 2019-03-04 PROCEDURE — 1101F PT FALLS ASSESS-DOCD LE1/YR: CPT | Mod: CPTII,S$GLB,, | Performed by: PHYSICIAN ASSISTANT

## 2019-03-04 PROCEDURE — 3078F PR MOST RECENT DIASTOLIC BLOOD PRESSURE < 80 MM HG: ICD-10-PCS | Mod: CPTII,S$GLB,, | Performed by: PHYSICIAN ASSISTANT

## 2019-03-04 PROCEDURE — 87880 POCT RAPID STREP A: ICD-10-PCS | Mod: QW,S$GLB,, | Performed by: PHYSICIAN ASSISTANT

## 2019-03-04 PROCEDURE — 99214 PR OFFICE/OUTPT VISIT, EST, LEVL IV, 30-39 MIN: ICD-10-PCS | Mod: S$GLB,,, | Performed by: PHYSICIAN ASSISTANT

## 2019-03-04 PROCEDURE — 1101F PR PT FALLS ASSESS DOC 0-1 FALLS W/OUT INJ PAST YR: ICD-10-PCS | Mod: CPTII,S$GLB,, | Performed by: PHYSICIAN ASSISTANT

## 2019-03-04 PROCEDURE — 3078F DIAST BP <80 MM HG: CPT | Mod: CPTII,S$GLB,, | Performed by: PHYSICIAN ASSISTANT

## 2019-03-04 PROCEDURE — 99214 OFFICE O/P EST MOD 30 MIN: CPT | Mod: S$GLB,,, | Performed by: PHYSICIAN ASSISTANT

## 2019-03-04 PROCEDURE — 3077F PR MOST RECENT SYSTOLIC BLOOD PRESSURE >= 140 MM HG: ICD-10-PCS | Mod: CPTII,S$GLB,, | Performed by: PHYSICIAN ASSISTANT

## 2019-03-04 PROCEDURE — 87880 STREP A ASSAY W/OPTIC: CPT | Mod: QW,S$GLB,, | Performed by: PHYSICIAN ASSISTANT

## 2019-03-04 RX ORDER — AMOXICILLIN 500 MG/1
500 CAPSULE ORAL EVERY 12 HOURS
Qty: 20 CAPSULE | Refills: 0 | Status: SHIPPED | OUTPATIENT
Start: 2019-03-04 | End: 2019-03-14

## 2019-03-04 NOTE — LETTER
March 4, 2019      [facility] [address] [address]  Phone: [phone]  Fax: [phone]       Patient: Jennifer Motley   YOB: 1950  Date of Visit: 03/04/2019    To Whom It May Concern:    Ze Motley  was at [facility] on 03/04/2019. She may return to work/school on 03/06/2019 with no restrictions. If you have any questions or concerns, or if I can be of further assistance, please do not hesitate to contact me.    Sincerely,    Luana Thomas PA-C

## 2019-03-04 NOTE — PATIENT INSTRUCTIONS
-Please take antibiotic to completion.  Take tylenol/motrin as needed for pain/fever.    Please follow up with your primary care provider within 2-5 days if your signs and symptoms have not resolved or worsen.     If your condition worsens or fails to improve we recommend that you receive another evaluation at the emergency room immediately or contact your primary medical clinic to discuss your concerns.   You must understand that you have received an Urgent Care treatment only and that you may be released before all of your medical problems are known or treated. You, the patient, will arrange for follow up care as instructed.         Middle Ear Infection (Adult)  You have an infection of the middle ear, the space behind the eardrum. This is also called acute otitis media (AOM). Sometimes it is caused by the common cold. This is because congestion can block the internal passage (eustachian tube) that drains fluid from the middle ear. When the middle ear fills with fluid, bacteria can grow there and cause an infection. Oral antibiotics are used to treat this illness, not ear drops. Symptoms usually start to improve within 1 to 2 days of treatment.    Home care  The following are general care guidelines:  · Finish all of the antibiotic medicine given, even though you may feel better after the first few days.  · You may use over-the-counter medicine, such as acetaminophen or ibuprofen, to control pain and fever, unless something else was prescribed. If you have chronic liver or kidney disease or have ever had a stomach ulcer or gastrointestinal bleeding, talk with your healthcare provider before using these medicines. Do not give aspirin to anyone under 18 years of age who has a fever. It may cause severe illness or death.  Follow-up care  Follow up with your healthcare provider, or as advised, in 2 weeks if all symptoms have not gotten better, or if hearing doesn't go back to normal within 1 month.  When to seek  medical advice  Call your healthcare provider right away if any of these occur:  · Ear pain gets worse or does not improve after 3 days of treatment  · Unusual drowsiness or confusion  · Neck pain, stiff neck, or headache  · Fluid or blood draining from the ear canal  · Fever of 100.4°F (38°C) or as advised   · Seizure  Date Last Reviewed: 6/1/2016  © 3274-8699 [employer]. [address] [place] [address], [place] [address]. All rights reserved. This information is not intended as a substitute for professional medical care. Always follow your healthcare professional's instructions.

## 2019-03-04 NOTE — PROGRESS NOTES
"Subjective:       Patient ID: Jennifer Motley is a 68 y.o. female.    Vitals:  height is 5' 4" (1.626 m) and weight is 87.5 kg (193 lb). Her temperature is 98.6 °F (37 °C). Her blood pressure is 151/60 (abnormal) and her pulse is 97. Her respiration is 18 and oxygen saturation is 98%.     Chief Complaint: Sore Throat (last night left ear and throat)    Sore Throat    This is a new problem. The current episode started yesterday. The problem has been rapidly worsening. The pain is worse on the left side. There has been no fever. The pain is at a severity of 10/10. The pain is severe. Associated symptoms include ear pain. Pertinent negatives include no congestion, coughing, diarrhea, headaches, shortness of breath or vomiting. She has tried acetaminophen for the symptoms. The treatment provided no relief.       Constitution: Negative for chills, fatigue and fever.   HENT: Positive for ear pain and sore throat. Negative for congestion.    Neck: Negative for painful lymph nodes.   Cardiovascular: Negative for chest pain and leg swelling.   Eyes: Negative for double vision and blurred vision.   Respiratory: Negative for cough and shortness of breath.    Gastrointestinal: Negative for nausea, vomiting and diarrhea.   Genitourinary: Negative for dysuria, frequency, urgency and history of kidney stones.   Musculoskeletal: Negative for joint pain, joint swelling, muscle cramps and muscle ache.   Skin: Negative for color change, pale, rash, erythema and bruising.   Allergic/Immunologic: Negative for seasonal allergies.   Neurological: Negative for dizziness, history of vertigo, light-headedness, passing out and headaches.   Hematologic/Lymphatic: Negative for swollen lymph nodes.   Psychiatric/Behavioral: Negative for nervous/anxious, sleep disturbance and depression. The patient is not nervous/anxious.        Objective:      Physical Exam   Constitutional: She is oriented to person, place, and time. Vital signs are normal. She " appears well-developed and well-nourished. She does not appear ill. No distress.   HENT:   Head: Normocephalic and atraumatic.   Right Ear: Hearing, tympanic membrane, external ear and ear canal normal.   Left Ear: Hearing, external ear and ear canal normal. Tympanic membrane is erythematous and bulging. A middle ear effusion is present.   Nose: Mucosal edema present. Right sinus exhibits no maxillary sinus tenderness and no frontal sinus tenderness. Left sinus exhibits maxillary sinus tenderness and frontal sinus tenderness.   Mouth/Throat: Uvula is midline and oropharynx is clear and moist. No oropharyngeal exudate, posterior oropharyngeal edema or posterior oropharyngeal erythema.   Eyes: Conjunctivae, EOM and lids are normal. Right eye exhibits no discharge. Left eye exhibits no discharge.   Neck: Normal range of motion. Neck supple.   Cardiovascular: Normal rate, regular rhythm and normal heart sounds. Exam reveals no gallop and no friction rub.   No murmur heard.  Pulmonary/Chest: Effort normal and breath sounds normal. No stridor. No respiratory distress. She has no decreased breath sounds. She has no wheezes. She has no rhonchi. She has no rales.   Musculoskeletal: Normal range of motion.   Lymphadenopathy:        Head (right side): No submandibular and no tonsillar adenopathy present.        Head (left side): No submandibular and no tonsillar adenopathy present.   Neurological: She is alert and oriented to person, place, and time.   Skin: Skin is warm and dry. No rash noted. She is not diaphoretic. No erythema. No pallor.   Psychiatric: She has a normal mood and affect. Her behavior is normal.   Nursing note and vitals reviewed.      Assessment:       1. Acute bacterial infection of left middle ear        Office Visit on 03/04/2019   Component Date Value Ref Range Status    Rapid Strep A Screen 03/04/2019 Negative  Negative Final     Acceptable 03/04/2019 Yes   Final     Plan:         Acute  bacterial infection of left middle ear  -     POCT rapid strep A  -     amoxicillin (AMOXIL) 500 MG capsule; Take 1 capsule (500 mg total) by mouth every 12 (twelve) hours. for 10 days  Dispense: 20 capsule; Refill: 0      Patient Instructions   -Please take antibiotic to completion.  Take tylenol/motrin as needed for pain/fever.    Please follow up with your primary care provider within 2-5 days if your signs and symptoms have not resolved or worsen.     If your condition worsens or fails to improve we recommend that you receive another evaluation at the emergency room immediately or contact your primary medical clinic to discuss your concerns.   You must understand that you have received an Urgent Care treatment only and that you may be released before all of your medical problems are known or treated. You, the patient, will arrange for follow up care as instructed.         Middle Ear Infection (Adult)  You have an infection of the middle ear, the space behind the eardrum. This is also called acute otitis media (AOM). Sometimes it is caused by the common cold. This is because congestion can block the internal passage (eustachian tube) that drains fluid from the middle ear. When the middle ear fills with fluid, bacteria can grow there and cause an infection. Oral antibiotics are used to treat this illness, not ear drops. Symptoms usually start to improve within 1 to 2 days of treatment.    Home care  The following are general care guidelines:  · Finish all of the antibiotic medicine given, even though you may feel better after the first few days.  · You may use over-the-counter medicine, such as acetaminophen or ibuprofen, to control pain and fever, unless something else was prescribed. If you have chronic liver or kidney disease or have ever had a stomach ulcer or gastrointestinal bleeding, talk with your healthcare provider before using these medicines. Do not give aspirin to anyone under 18 years of age who has  a fever. It may cause severe illness or death.  Follow-up care  Follow up with your healthcare provider, or as advised, in 2 weeks if all symptoms have not gotten better, or if hearing doesn't go back to normal within 1 month.  When to seek medical advice  Call your healthcare provider right away if any of these occur:  · Ear pain gets worse or does not improve after 3 days of treatment  · Unusual drowsiness or confusion  · Neck pain, stiff neck, or headache  · Fluid or blood draining from the ear canal  · Fever of 100.4°F (38°C) or as advised   · Seizure  Date Last Reviewed: 6/1/2016  © 5577-1324 [employer]. [address] [place] [address], [place] [address]. All rights reserved. This information is not intended as a substitute for professional medical care. Always follow your healthcare professional's instructions.

## 2019-03-18 ENCOUNTER — OFFICE VISIT (OUTPATIENT)
Dept: URGENT CARE | Facility: CLINIC | Age: 69
End: 2019-03-18
Payer: COMMERCIAL

## 2019-03-18 VITALS
HEART RATE: 78 BPM | SYSTOLIC BLOOD PRESSURE: 125 MMHG | HEIGHT: 64 IN | DIASTOLIC BLOOD PRESSURE: 59 MMHG | WEIGHT: 185 LBS | BODY MASS INDEX: 31.58 KG/M2 | OXYGEN SATURATION: 97 % | TEMPERATURE: 98 F

## 2019-03-18 DIAGNOSIS — B34.9 VIRAL SYNDROME: Primary | ICD-10-CM

## 2019-03-18 DIAGNOSIS — H92.03 OTALGIA OF BOTH EARS: ICD-10-CM

## 2019-03-18 PROCEDURE — 3074F PR MOST RECENT SYSTOLIC BLOOD PRESSURE < 130 MM HG: ICD-10-PCS | Mod: CPTII,S$GLB,, | Performed by: NURSE PRACTITIONER

## 2019-03-18 PROCEDURE — 96372 THER/PROPH/DIAG INJ SC/IM: CPT | Mod: S$GLB,,, | Performed by: NURSE PRACTITIONER

## 2019-03-18 PROCEDURE — 1101F PT FALLS ASSESS-DOCD LE1/YR: CPT | Mod: CPTII,S$GLB,, | Performed by: NURSE PRACTITIONER

## 2019-03-18 PROCEDURE — 3074F SYST BP LT 130 MM HG: CPT | Mod: CPTII,S$GLB,, | Performed by: NURSE PRACTITIONER

## 2019-03-18 PROCEDURE — 3078F DIAST BP <80 MM HG: CPT | Mod: CPTII,S$GLB,, | Performed by: NURSE PRACTITIONER

## 2019-03-18 PROCEDURE — 3078F PR MOST RECENT DIASTOLIC BLOOD PRESSURE < 80 MM HG: ICD-10-PCS | Mod: CPTII,S$GLB,, | Performed by: NURSE PRACTITIONER

## 2019-03-18 PROCEDURE — 99214 PR OFFICE/OUTPT VISIT, EST, LEVL IV, 30-39 MIN: ICD-10-PCS | Mod: 25,S$GLB,, | Performed by: NURSE PRACTITIONER

## 2019-03-18 PROCEDURE — 1101F PR PT FALLS ASSESS DOC 0-1 FALLS W/OUT INJ PAST YR: ICD-10-PCS | Mod: CPTII,S$GLB,, | Performed by: NURSE PRACTITIONER

## 2019-03-18 PROCEDURE — 96372 PR INJECTION,THERAP/PROPH/DIAG2ST, IM OR SUBCUT: ICD-10-PCS | Mod: S$GLB,,, | Performed by: NURSE PRACTITIONER

## 2019-03-18 PROCEDURE — 99214 OFFICE O/P EST MOD 30 MIN: CPT | Mod: 25,S$GLB,, | Performed by: NURSE PRACTITIONER

## 2019-03-18 RX ORDER — LEVOCETIRIZINE DIHYDROCHLORIDE 5 MG/1
5 TABLET, FILM COATED ORAL NIGHTLY
Qty: 30 TABLET | Refills: 0 | Status: SHIPPED | OUTPATIENT
Start: 2019-03-18 | End: 2019-11-20

## 2019-03-18 RX ORDER — IBUPROFEN 800 MG/1
800 TABLET ORAL EVERY 8 HOURS PRN
Qty: 30 TABLET | Refills: 0 | Status: SHIPPED | OUTPATIENT
Start: 2019-03-18 | End: 2019-12-02

## 2019-03-18 RX ORDER — BETAMETHASONE SODIUM PHOSPHATE AND BETAMETHASONE ACETATE 3; 3 MG/ML; MG/ML
6 INJECTION, SUSPENSION INTRA-ARTICULAR; INTRALESIONAL; INTRAMUSCULAR; SOFT TISSUE
Status: COMPLETED | OUTPATIENT
Start: 2019-03-18 | End: 2019-03-18

## 2019-03-18 RX ADMIN — BETAMETHASONE SODIUM PHOSPHATE AND BETAMETHASONE ACETATE 6 MG: 3; 3 INJECTION, SUSPENSION INTRA-ARTICULAR; INTRALESIONAL; INTRAMUSCULAR; SOFT TISSUE at 04:03

## 2019-03-18 NOTE — LETTER
March 18, 2019      [facility] [address] [address]  Phone: [phone]  Fax: [phone]       Patient: Jennifer Motley   YOB: 1950  Date of Visit: 03/18/2019    To Whom It May Concern:    Ze Motley  was at [facility] on 03/18/2019. She may return to work/school on 03/20/2019 with no restrictions. If you have any questions or concerns, or if I can be of further assistance, please do not hesitate to contact me.    Sincerely,    Jose Bourgeois, RT

## 2019-03-18 NOTE — PROGRESS NOTES
"Subjective:       Patient ID: Jennifer Motley is a 68 y.o. female.    Vitals:  height is 5' 4" (1.626 m) and weight is 83.9 kg (185 lb). Her temperature is 98.3 °F (36.8 °C). Her blood pressure is 125/59 (abnormal) and her pulse is 78. Her oxygen saturation is 97%.     Chief Complaint: Otalgia    Patient states she had ear infection 2 weeks ago and was on Amoxcillin 500 mg but is still having pain.       Otalgia    There is pain in both ears. This is a recurrent problem. The current episode started 1 to 4 weeks ago. The problem occurs constantly. The problem has been gradually worsening. There has been no fever. The pain is at a severity of 8/10. The pain is moderate. Associated symptoms include coughing and headaches. Pertinent negatives include no diarrhea, rash or vomiting. She has tried antibiotics for the symptoms. The treatment provided no relief.       Constitution: Negative for chills, fatigue and fever.   HENT: Positive for ear pain and congestion.    Neck: Negative for painful lymph nodes.   Cardiovascular: Negative for chest pain and leg swelling.   Eyes: Negative for double vision and blurred vision.   Respiratory: Positive for cough. Negative for shortness of breath.    Gastrointestinal: Negative for nausea, vomiting and diarrhea.   Genitourinary: Negative for dysuria, frequency, urgency and history of kidney stones.   Musculoskeletal: Negative for joint pain, joint swelling, muscle cramps and muscle ache.   Skin: Negative for color change, pale, rash and bruising.   Allergic/Immunologic: Negative for seasonal allergies.   Neurological: Positive for headaches. Negative for dizziness, history of vertigo, light-headedness and passing out.   Hematologic/Lymphatic: Negative for swollen lymph nodes.   Psychiatric/Behavioral: Negative for nervous/anxious, sleep disturbance and depression. The patient is not nervous/anxious.        Objective:      Physical Exam   Constitutional: She is oriented to person, place, " and time. She appears well-developed and well-nourished. She is cooperative.  Non-toxic appearance. She does not appear ill. No distress.   HENT:   Head: Normocephalic and atraumatic.   Right Ear: Hearing, tympanic membrane, external ear and ear canal normal. There is swelling and tenderness.   Left Ear: Hearing, tympanic membrane, external ear and ear canal normal. There is swelling and tenderness.   Nose: Rhinorrhea present. No mucosal edema or nasal deformity. No epistaxis. Right sinus exhibits no maxillary sinus tenderness and no frontal sinus tenderness. Left sinus exhibits no maxillary sinus tenderness and no frontal sinus tenderness.   Mouth/Throat: Uvula is midline, oropharynx is clear and moist and mucous membranes are normal. No trismus in the jaw. Normal dentition. No uvula swelling. No posterior oropharyngeal erythema.   PND, cough   Eyes: Conjunctivae and lids are normal. No scleral icterus.   Sclera clear bilat   Neck: Trachea normal, full passive range of motion without pain and phonation normal. Neck supple.   Cardiovascular: Normal rate, regular rhythm, normal heart sounds, intact distal pulses and normal pulses.   Pulmonary/Chest: Effort normal and breath sounds normal. No stridor. No respiratory distress. She has no decreased breath sounds. She has no wheezes. She has no rhonchi. She has no rales.   Abdominal: Soft. Normal appearance and bowel sounds are normal. She exhibits no distension. There is no tenderness.   Musculoskeletal: Normal range of motion. She exhibits no edema or deformity.   Neurological: She is alert and oriented to person, place, and time. She exhibits normal muscle tone. Coordination normal.   Skin: Skin is warm, dry and intact. She is not diaphoretic. No pallor.   Psychiatric: She has a normal mood and affect. Her speech is normal and behavior is normal. Judgment and thought content normal. Cognition and memory are normal.   Nursing note and vitals reviewed.      Assessment:        1. Viral syndrome    2. Otalgia of both ears        Plan:         Viral syndrome    Otalgia of both ears    Other orders  -     betamethasone acetate-betamethasone sodium phosphate injection 6 mg  -     ibuprofen (ADVIL,MOTRIN) 800 MG tablet; Take 1 tablet (800 mg total) by mouth every 8 (eight) hours as needed.  Dispense: 30 tablet; Refill: 0  -     levocetirizine (XYZAL) 5 MG tablet; Take 1 tablet (5 mg total) by mouth every evening.  Dispense: 30 tablet; Refill: 0        Earache, No Infection (Adult)  Earaches can happen without an infection. This occurs when air and fluid build up behind the eardrum causing a feeling of fullness and discomfort and reduced hearing. This is called otitis media with effusion (OME) or serous otitis media. It means there is fluid in the middle ear. It is not the same as acute otitis media, which is typically from infection.  OME can happen when you have a cold if congestion blocks the passage that drains the middle ear. This passage is called the eustachian tube. OME may also occur with nasal allergies or after a bacterial middle ear infection.    The pain or discomfort may come and go. You may hear clicking or popping sounds when you chew or swallow. You may feel that your balance is off. Or you may hear ringing in the ear.  It often takes from several weeks up to 3 months for the fluid to clear on its own. Oral pain relievers and ear drops help if there is pain. Decongestants and antihistamines sometimes help. Antibiotics don't help since there is no infection. Your doctor may prescribe a nasal spray to help reduce swelling in the nose and eustachian tube. This can allow the ear to drain.  If your OME doesn't improve after 3 months, surgery may be used to drain the fluid and insert a small tube in the eardrum to allow continued drainage.  Because the middle ear fluid can become infected, it is important to watch for signs of an ear infection which may develop later. These  signs include increased ear pain, fever, or drainage from the ear.  Home care  The following guidelines will help you care for yourself at home:  · You may use over-the-counter medicine as directed to control pain, unless another medicine was prescribed. If you have chronic liver or kidney disease or ever had a stomach ulcer or GI bleeding, talk with your doctor before using these medicines. Aspirin should never be used in anyone under 18 years of age who is ill with a fever. It may cause severe liver damage.  · You may use over-the-counter decongestants such as phenylephrine or pseudoephedrine. But they are not always helpful. Don't use nasal spray decongestants more than 3 days. Longer use can make congestion worse. Prescription nasal sprays from your doctor don't typically have those restrictions.  · Antihistamines may help if you are also having allergy symptoms.  · You may use medicines such as guaifenesin to thin mucus and promote drainage.  Follow-up care  Follow up with your healthcare provider or as advised if you are not feeling better after 3 days.  When to seek medical advice  Call your healthcare provider right away if any of the following occur:  · Your ear pain gets worse or does not start to improve   · Fever of 100.4°F (38°C) or higher, or as directed by your healthcare provider  · Fluid or blood draining from the ear  · Headache or sinus pain  · Stiff neck  · Unusual drowsiness or confusion  Date Last Reviewed: 10/1/2016  © 1004-7716 [employer]. [address] [place] [address], [place] [address]. All rights reserved. This information is not intended as a substitute for professional medical care. Always follow your healthcare professional's instructions.        Viral Syndrome (Adult)  A viral illness may cause a number of symptoms. The symptoms depend on the part of the body that the virus affects. If it settles in your nose, throat, and lungs, it may cause cough, sore throat, congestion, and  "sometimes headache. If it settles in your stomach and intestinal tract, it may cause vomiting and diarrhea. Sometimes it causes vague symptoms like "aching all over," feeling tired, loss of appetite, or fever.  A viral illness usually lasts 1 to 2 weeks, but sometimes it lasts longer. In some cases, a more serious infection can look like a viral syndrome in the first few days of the illness. You may need another exam and additional tests to know the difference. Watch for the warning signs listed below.  Home care  Follow these guidelines for taking care of yourself at home:  · If symptoms are severe, rest at home for the first 2 to 3 days.  · Stay away from cigarette smoke - both your smoke and the smoke from others.  · You may use over-the-counter acetaminophen or ibuprofen for fever, muscle aching, and headache, unless another medicine was prescribed for this. If you have chronic liver or kidney disease or ever had a stomach ulcer or GI bleeding, talk with your doctor before using these medicines. No one who is younger than 18 and ill with a fever should take aspirin. It may cause severe disease or death.  · Your appetite may be poor, so a light diet is fine. Avoid dehydration by drinking 8 to 12 8-ounce glasses of fluids each day. This may include water; orange juice; lemonade; apple, grape, and cranberry juice; clear fruit drinks; electrolyte replacement and sports drinks; and decaffeinated teas and coffee. If you have been diagnosed with a kidney disease, ask your doctor how much and what types of fluids you should drink to prevent dehydration. If you have kidney disease, drinking too much fluid can cause it build up in the your body and be dangerous to your health.  · Over-the-counter remedies won't shorten the length of the illness but may be helpful for cough, sore throat; and nasal and sinus congestion. Don't use decongestants if you have high blood pressure.  Follow-up care  Follow up with your healthcare " provider if you do not improve over the next week.  Call 911  Get emergency medical care if any of the following occur:  · Convulsion  · Feeling weak, dizzy, or like you are going to faint  · Chest pain, shortness of breath, wheezing, or difficulty breathing  When to seek medical advice  Call your healthcare provider right away if any of these occur:  · Cough with lots of colored sputum (mucus) or blood in your sputum  · Chest pain, shortness of breath, wheezing, or difficulty breathing  · Severe headache; face, neck, or ear pain  · Severe, constant pain in the lower right side of your belly (abdominal)  · Continued vomiting (cant keep liquids down)  · Frequent diarrhea (more than 5 times a day); blood (red or black color) or mucus in diarrhea  · Feeling weak, dizzy, or like you are going to faint  · Extreme thirst  · Fever of 100.4°F (38°C) or higher, or as directed by your healthcare provider  Date Last Reviewed: 9/25/2015  © 7819-4314 [employer]. [address] [place] [address]. All rights reserved. This information is not intended as a substitute for professional medical care. Always follow your healthcare professional's instructions.      Please return here or go to the Emergency Department for any concerns or worsening of condition.  If you were prescribed a narcotic medication, do not drive or operate heavy equipment or machinery while taking these medications.  Please follow up with your primary care doctor or specialist as needed.    If you  smoke, please stop smoking.

## 2019-03-18 NOTE — PATIENT INSTRUCTIONS
Earache, No Infection (Adult)  Earaches can happen without an infection. This occurs when air and fluid build up behind the eardrum causing a feeling of fullness and discomfort and reduced hearing. This is called otitis media with effusion (OME) or serous otitis media. It means there is fluid in the middle ear. It is not the same as acute otitis media, which is typically from infection.  OME can happen when you have a cold if congestion blocks the passage that drains the middle ear. This passage is called the eustachian tube. OME may also occur with nasal allergies or after a bacterial middle ear infection.    The pain or discomfort may come and go. You may hear clicking or popping sounds when you chew or swallow. You may feel that your balance is off. Or you may hear ringing in the ear.  It often takes from several weeks up to 3 months for the fluid to clear on its own. Oral pain relievers and ear drops help if there is pain. Decongestants and antihistamines sometimes help. Antibiotics don't help since there is no infection. Your doctor may prescribe a nasal spray to help reduce swelling in the nose and eustachian tube. This can allow the ear to drain.  If your OME doesn't improve after 3 months, surgery may be used to drain the fluid and insert a small tube in the eardrum to allow continued drainage.  Because the middle ear fluid can become infected, it is important to watch for signs of an ear infection which may develop later. These signs include increased ear pain, fever, or drainage from the ear.  Home care  The following guidelines will help you care for yourself at home:  · You may use over-the-counter medicine as directed to control pain, unless another medicine was prescribed. If you have chronic liver or kidney disease or ever had a stomach ulcer or GI bleeding, talk with your doctor before using these medicines. Aspirin should never be used in anyone under 18 years of age who is ill with a fever. It  "may cause severe liver damage.  · You may use over-the-counter decongestants such as phenylephrine or pseudoephedrine. But they are not always helpful. Don't use nasal spray decongestants more than 3 days. Longer use can make congestion worse. Prescription nasal sprays from your doctor don't typically have those restrictions.  · Antihistamines may help if you are also having allergy symptoms.  · You may use medicines such as guaifenesin to thin mucus and promote drainage.  Follow-up care  Follow up with your healthcare provider or as advised if you are not feeling better after 3 days.  When to seek medical advice  Call your healthcare provider right away if any of the following occur:  · Your ear pain gets worse or does not start to improve   · Fever of 100.4°F (38°C) or higher, or as directed by your healthcare provider  · Fluid or blood draining from the ear  · Headache or sinus pain  · Stiff neck  · Unusual drowsiness or confusion  Date Last Reviewed: 10/1/2016  © 4910-9047 [employer]. [address] [place] [address]. All rights reserved. This information is not intended as a substitute for professional medical care. Always follow your healthcare professional's instructions.        Viral Syndrome (Adult)  A viral illness may cause a number of symptoms. The symptoms depend on the part of the body that the virus affects. If it settles in your nose, throat, and lungs, it may cause cough, sore throat, congestion, and sometimes headache. If it settles in your stomach and intestinal tract, it may cause vomiting and diarrhea. Sometimes it causes vague symptoms like "aching all over," feeling tired, loss of appetite, or fever.  A viral illness usually lasts 1 to 2 weeks, but sometimes it lasts longer. In some cases, a more serious infection can look like a viral syndrome in the first few days of the illness. You may need another exam and additional tests to know the difference. Watch for the " warning signs listed below.  Home care  Follow these guidelines for taking care of yourself at home:  · If symptoms are severe, rest at home for the first 2 to 3 days.  · Stay away from cigarette smoke - both your smoke and the smoke from others.  · You may use over-the-counter acetaminophen or ibuprofen for fever, muscle aching, and headache, unless another medicine was prescribed for this. If you have chronic liver or kidney disease or ever had a stomach ulcer or GI bleeding, talk with your doctor before using these medicines. No one who is younger than 18 and ill with a fever should take aspirin. It may cause severe disease or death.  · Your appetite may be poor, so a light diet is fine. Avoid dehydration by drinking 8 to 12 8-ounce glasses of fluids each day. This may include water; orange juice; lemonade; apple, grape, and cranberry juice; clear fruit drinks; electrolyte replacement and sports drinks; and decaffeinated teas and coffee. If you have been diagnosed with a kidney disease, ask your doctor how much and what types of fluids you should drink to prevent dehydration. If you have kidney disease, drinking too much fluid can cause it build up in the your body and be dangerous to your health.  · Over-the-counter remedies won't shorten the length of the illness but may be helpful for cough, sore throat; and nasal and sinus congestion. Don't use decongestants if you have high blood pressure.  Follow-up care  Follow up with your healthcare provider if you do not improve over the next week.  Call 911  Get emergency medical care if any of the following occur:  · Convulsion  · Feeling weak, dizzy, or like you are going to faint  · Chest pain, shortness of breath, wheezing, or difficulty breathing  When to seek medical advice  Call your healthcare provider right away if any of these occur:  · Cough with lots of colored sputum (mucus) or blood in your sputum  · Chest pain, shortness of breath, wheezing, or  difficulty breathing  · Severe headache; face, neck, or ear pain  · Severe, constant pain in the lower right side of your belly (abdominal)  · Continued vomiting (cant keep liquids down)  · Frequent diarrhea (more than 5 times a day); blood (red or black color) or mucus in diarrhea  · Feeling weak, dizzy, or like you are going to faint  · Extreme thirst  · Fever of 100.4°F (38°C) or higher, or as directed by your healthcare provider  Date Last Reviewed: 9/25/2015 © 2000-2017 [employer]. [address] [place] [address]. All rights reserved. This information is not intended as a substitute for professional medical care. Always follow your healthcare professional's instructions.      Please return here or go to the Emergency Department for any concerns or worsening of condition.  If you were prescribed a narcotic medication, do not drive or operate heavy equipment or machinery while taking these medications.  Please follow up with your primary care doctor or specialist as needed.    If you  smoke, please stop smoking.

## 2019-05-03 DIAGNOSIS — E11.9 TYPE 2 DIABETES MELLITUS WITHOUT COMPLICATION: ICD-10-CM

## 2019-05-17 DIAGNOSIS — Z12.11 COLON CANCER SCREENING: ICD-10-CM

## 2019-05-20 ENCOUNTER — CLINICAL SUPPORT (OUTPATIENT)
Dept: OPHTHALMOLOGY | Facility: CLINIC | Age: 69
End: 2019-05-20
Payer: COMMERCIAL

## 2019-05-20 DIAGNOSIS — Z23 NEED FOR VIRAL IMMUNIZATION: Primary | ICD-10-CM

## 2019-05-20 PROCEDURE — 90732 PPSV23 VACC 2 YRS+ SUBQ/IM: CPT | Mod: S$GLB,,, | Performed by: FAMILY MEDICINE

## 2019-05-20 PROCEDURE — 90471 IMMUNIZATION ADMIN: CPT | Mod: S$GLB,,, | Performed by: FAMILY MEDICINE

## 2019-05-20 PROCEDURE — 90471 PNEUMOCOCCAL POLYSACCHARIDE VACCINE 23-VALENT =>2YO SQ IM: ICD-10-PCS | Mod: S$GLB,,, | Performed by: FAMILY MEDICINE

## 2019-05-20 PROCEDURE — 90732 PNEUMOCOCCAL POLYSACCHARIDE VACCINE 23-VALENT =>2YO SQ IM: ICD-10-PCS | Mod: S$GLB,,, | Performed by: FAMILY MEDICINE

## 2019-05-20 NOTE — PROGRESS NOTES
"Patient was given vaccine information sheet for the [id number] (pneumococcal polyvalent) vaccine. The area of injection was palpated using the acromion process as a landmark. This area was cleaned with alcohol. Using a 25g 1" safety needle, 0.5mL of the vaccine was placed into the right deltoid muscle. The injection site was dressed with a bandage. Patient experienced no complications and was discharged in stable condition. Pneumovax 23 (pneumococcal polyvalent) vaccine Lot: [id number] Exp: 03/26/2020.    "

## 2019-06-26 ENCOUNTER — PATIENT MESSAGE (OUTPATIENT)
Dept: INTERNAL MEDICINE | Facility: CLINIC | Age: 69
End: 2019-06-26

## 2019-07-25 ENCOUNTER — OFFICE VISIT (OUTPATIENT)
Dept: INTERNAL MEDICINE | Facility: CLINIC | Age: 69
End: 2019-07-25
Attending: FAMILY MEDICINE
Payer: COMMERCIAL

## 2019-07-25 ENCOUNTER — PATIENT MESSAGE (OUTPATIENT)
Dept: INTERNAL MEDICINE | Facility: CLINIC | Age: 69
End: 2019-07-25

## 2019-07-25 ENCOUNTER — LAB VISIT (OUTPATIENT)
Dept: LAB | Facility: OTHER | Age: 69
End: 2019-07-25
Attending: FAMILY MEDICINE
Payer: COMMERCIAL

## 2019-07-25 VITALS
OXYGEN SATURATION: 99 % | DIASTOLIC BLOOD PRESSURE: 68 MMHG | HEART RATE: 60 BPM | SYSTOLIC BLOOD PRESSURE: 136 MMHG | BODY MASS INDEX: 33.83 KG/M2 | WEIGHT: 197.06 LBS

## 2019-07-25 DIAGNOSIS — E11.9 TYPE 2 DIABETES MELLITUS WITHOUT COMPLICATION: ICD-10-CM

## 2019-07-25 DIAGNOSIS — Z00.00 ANNUAL PHYSICAL EXAM: Primary | ICD-10-CM

## 2019-07-25 DIAGNOSIS — E11.9 TYPE 2 DIABETES MELLITUS WITHOUT COMPLICATION, WITHOUT LONG-TERM CURRENT USE OF INSULIN: ICD-10-CM

## 2019-07-25 DIAGNOSIS — R07.9 CHEST PAIN, UNSPECIFIED TYPE: ICD-10-CM

## 2019-07-25 DIAGNOSIS — I10 ESSENTIAL HYPERTENSION: ICD-10-CM

## 2019-07-25 DIAGNOSIS — Z11.59 NEED FOR HEPATITIS C SCREENING TEST: ICD-10-CM

## 2019-07-25 DIAGNOSIS — E66.9 OBESITY WITH SERIOUS COMORBIDITY, UNSPECIFIED CLASSIFICATION, UNSPECIFIED OBESITY TYPE: ICD-10-CM

## 2019-07-25 DIAGNOSIS — E11.9 CONTROLLED TYPE 2 DIABETES MELLITUS WITHOUT COMPLICATION, WITHOUT LONG-TERM CURRENT USE OF INSULIN: ICD-10-CM

## 2019-07-25 LAB
ALBUMIN SERPL BCP-MCNC: 3.9 G/DL (ref 3.5–5.2)
ALP SERPL-CCNC: 63 U/L (ref 55–135)
ALT SERPL W/O P-5'-P-CCNC: 29 U/L (ref 10–44)
ANION GAP SERPL CALC-SCNC: 9 MMOL/L (ref 8–16)
AST SERPL-CCNC: 21 U/L (ref 10–40)
BILIRUB SERPL-MCNC: 0.3 MG/DL (ref 0.1–1)
BUN SERPL-MCNC: 19 MG/DL (ref 8–23)
CALCIUM SERPL-MCNC: 9.9 MG/DL (ref 8.7–10.5)
CHLORIDE SERPL-SCNC: 103 MMOL/L (ref 95–110)
CHOLEST SERPL-MCNC: 180 MG/DL (ref 120–199)
CHOLEST/HDLC SERPL: 3.1 {RATIO} (ref 2–5)
CO2 SERPL-SCNC: 28 MMOL/L (ref 23–29)
CREAT SERPL-MCNC: 0.8 MG/DL (ref 0.5–1.4)
EST. GFR  (AFRICAN AMERICAN): >60 ML/MIN/1.73 M^2
EST. GFR  (NON AFRICAN AMERICAN): >60 ML/MIN/1.73 M^2
ESTIMATED AVG GLUCOSE: 146 MG/DL (ref 68–131)
GLUCOSE SERPL-MCNC: 132 MG/DL (ref 70–110)
HBA1C MFR BLD HPLC: 6.7 % (ref 4–5.6)
HCV AB SERPL QL IA: NEGATIVE
HDLC SERPL-MCNC: 59 MG/DL (ref 40–75)
HDLC SERPL: 32.8 % (ref 20–50)
LDLC SERPL CALC-MCNC: 104.2 MG/DL (ref 63–159)
NONHDLC SERPL-MCNC: 121 MG/DL
POTASSIUM SERPL-SCNC: 4.4 MMOL/L (ref 3.5–5.1)
PROT SERPL-MCNC: 7.8 G/DL (ref 6–8.4)
SODIUM SERPL-SCNC: 140 MMOL/L (ref 136–145)
TRIGL SERPL-MCNC: 84 MG/DL (ref 30–150)

## 2019-07-25 PROCEDURE — 36415 COLL VENOUS BLD VENIPUNCTURE: CPT

## 2019-07-25 PROCEDURE — 86803 HEPATITIS C AB TEST: CPT

## 2019-07-25 PROCEDURE — 83036 HEMOGLOBIN GLYCOSYLATED A1C: CPT

## 2019-07-25 PROCEDURE — 80053 COMPREHEN METABOLIC PANEL: CPT

## 2019-07-25 PROCEDURE — 99397 PR PREVENTIVE VISIT,EST,65 & OVER: ICD-10-PCS | Mod: S$GLB,,, | Performed by: FAMILY MEDICINE

## 2019-07-25 PROCEDURE — 3075F SYST BP GE 130 - 139MM HG: CPT | Mod: CPTII,S$GLB,, | Performed by: FAMILY MEDICINE

## 2019-07-25 PROCEDURE — 80061 LIPID PANEL: CPT

## 2019-07-25 PROCEDURE — 3078F PR MOST RECENT DIASTOLIC BLOOD PRESSURE < 80 MM HG: ICD-10-PCS | Mod: CPTII,S$GLB,, | Performed by: FAMILY MEDICINE

## 2019-07-25 PROCEDURE — 3075F PR MOST RECENT SYSTOLIC BLOOD PRESS GE 130-139MM HG: ICD-10-PCS | Mod: CPTII,S$GLB,, | Performed by: FAMILY MEDICINE

## 2019-07-25 PROCEDURE — 3078F DIAST BP <80 MM HG: CPT | Mod: CPTII,S$GLB,, | Performed by: FAMILY MEDICINE

## 2019-07-25 PROCEDURE — 99999 PR PBB SHADOW E&M-EST. PATIENT-LVL III: CPT | Mod: PBBFAC,,, | Performed by: FAMILY MEDICINE

## 2019-07-25 PROCEDURE — 99397 PER PM REEVAL EST PAT 65+ YR: CPT | Mod: S$GLB,,, | Performed by: FAMILY MEDICINE

## 2019-07-25 PROCEDURE — 99999 PR PBB SHADOW E&M-EST. PATIENT-LVL III: ICD-10-PCS | Mod: PBBFAC,,, | Performed by: FAMILY MEDICINE

## 2019-07-25 NOTE — PROGRESS NOTES
Subjective:      Patient ID: Jennifer Motley is a 68 y.o. female.    Chief Complaint: Annual Exam (discuss labs/test)    HPI   Patient here today for annual exam. She does have a lot going on at home and is caring for her . She reports her mood is good though, no SI or HI. She does have intermittent chest pain when her  is trying to give her directions, if she deep breaths and calms down it resolves. It is burning type pain, no sob no nausea or vomiting with it.       Review of Systems   Constitutional: Negative for activity change, appetite change, chills, diaphoresis, fatigue, fever and unexpected weight change.   HENT: Negative for congestion, ear discharge, ear pain, hearing loss, postnasal drip, rhinorrhea, sinus pressure and sore throat.    Respiratory: Negative for cough, shortness of breath and wheezing.    Cardiovascular: Positive for chest pain.   Gastrointestinal: Negative for abdominal pain, constipation, diarrhea, nausea and vomiting.   Genitourinary: Negative for dysuria and frequency.   Musculoskeletal: Negative.    Psychiatric/Behavioral: Negative for suicidal ideas.     I personally reviewed Past Medical History, Past Surgical history,  Past Social History and Family History      Objective:   /68 (BP Location: Left arm, Patient Position: Sitting)   Pulse 60   Wt 89.4 kg (197 lb 1.5 oz)   SpO2 99%   BMI 33.83 kg/m²     Physical Exam   Constitutional: She is oriented to person, place, and time. She appears well-developed and well-nourished. No distress.   HENT:   Head: Normocephalic and atraumatic.   Right Ear: Hearing, tympanic membrane, external ear and ear canal normal.   Left Ear: Hearing, tympanic membrane, external ear and ear canal normal.   Nose: Nose normal.   Mouth/Throat: Uvula is midline and oropharynx is clear and moist. No oropharyngeal exudate.   Eyes: Pupils are equal, round, and reactive to light. Conjunctivae and EOM are normal. Right eye exhibits no discharge.  Left eye exhibits no discharge. No scleral icterus.   Neck: Normal range of motion. Neck supple.   Cardiovascular: Normal rate, regular rhythm, normal heart sounds and intact distal pulses. Exam reveals no gallop.   No murmur heard.  Pulses:       Dorsalis pedis pulses are 2+ on the right side, and 2+ on the left side.        Posterior tibial pulses are 2+ on the right side, and 2+ on the left side.   Pulmonary/Chest: Effort normal and breath sounds normal. No respiratory distress. She has no wheezes. She has no rales. She exhibits no tenderness.   Abdominal: Soft. Bowel sounds are normal. She exhibits no distension and no mass. There is no tenderness. There is no rebound and no guarding.   Musculoskeletal:        Right foot: There is normal range of motion and no deformity.        Left foot: There is normal range of motion and no deformity.   Feet:   Right Foot:   Protective Sensation: 6 sites tested. 6 sites sensed.   Skin Integrity: Negative for ulcer or blister.   Left Foot:   Protective Sensation: 6 sites tested. 6 sites sensed.   Skin Integrity: Negative for ulcer or blister.   Neurological: She is alert and oriented to person, place, and time.   Skin: Skin is warm and dry.   Vitals reviewed.      1. Annual physical exam    2. Chest pain, unspecified type    3. Controlled type 2 diabetes mellitus without complication, without long-term current use of insulin    4. Essential hypertension    5. Obesity with serious comorbidity, unspecified classification, unspecified obesity type        1. Reviewed labs with patient   2. Stress echo  3. Declined metformin and will work on lifestyle changes   4. stable, cont current regimen   5. Working on lifestyle changes     Orders Placed This Encounter   Procedures    Echocardiogram stress test

## 2019-08-02 ENCOUNTER — HOSPITAL ENCOUNTER (OUTPATIENT)
Dept: CARDIOLOGY | Facility: OTHER | Age: 69
Discharge: HOME | End: 2019-08-02
Attending: FAMILY MEDICINE
Payer: COMMERCIAL

## 2019-08-02 DIAGNOSIS — Z00.00 ANNUAL PHYSICAL EXAM: ICD-10-CM

## 2019-08-02 DIAGNOSIS — I10 ESSENTIAL HYPERTENSION: ICD-10-CM

## 2019-08-02 DIAGNOSIS — R07.9 CHEST PAIN, UNSPECIFIED TYPE: ICD-10-CM

## 2019-08-02 DIAGNOSIS — E11.9 CONTROLLED TYPE 2 DIABETES MELLITUS WITHOUT COMPLICATION, WITHOUT LONG-TERM CURRENT USE OF INSULIN: ICD-10-CM

## 2019-08-20 ENCOUNTER — PATIENT MESSAGE (OUTPATIENT)
Dept: INTERNAL MEDICINE | Facility: CLINIC | Age: 69
End: 2019-08-20

## 2019-08-20 ENCOUNTER — HOSPITAL ENCOUNTER (OUTPATIENT)
Dept: CARDIOLOGY | Facility: CLINIC | Age: 69
Discharge: HOME | End: 2019-08-20
Attending: FAMILY MEDICINE
Payer: COMMERCIAL

## 2019-08-20 ENCOUNTER — TELEPHONE (OUTPATIENT)
Dept: INTERNAL MEDICINE | Facility: CLINIC | Age: 69
End: 2019-08-20

## 2019-08-20 VITALS — WEIGHT: 187 LBS | HEIGHT: 64 IN | BODY MASS INDEX: 31.92 KG/M2

## 2019-08-20 DIAGNOSIS — I51.7 LEFT ATRIAL ENLARGEMENT: ICD-10-CM

## 2019-08-20 DIAGNOSIS — I49.3 PVC (PREMATURE VENTRICULAR CONTRACTION): ICD-10-CM

## 2019-08-20 DIAGNOSIS — R07.9 CHEST PAIN, UNSPECIFIED TYPE: Primary | ICD-10-CM

## 2019-08-20 DIAGNOSIS — I49.3 PVC (PREMATURE VENTRICULAR CONTRACTION): Primary | ICD-10-CM

## 2019-08-20 LAB
ASCENDING AORTA: 2.73 CM
BSA FOR ECHO PROCEDURE: 1.96 M2
CV ECHO LV RWT: 0.34 CM
CV STRESS BASE HR: 72 BPM
DIASTOLIC BLOOD PRESSURE: 59 MMHG
DOP CALC LVOT AREA: 3 CM2
DOP CALC LVOT DIAMETER: 1.97 CM
DOP CALC LVOT PEAK VEL: 1.12 M/S
DOP CALC LVOT STROKE VOLUME: 73.54 CM3
DOP CALCLVOT PEAK VEL VTI: 24.14 CM
E WAVE DECELERATION TIME: 200.04 MSEC
E/A RATIO: 0.69
E/E' RATIO: 10.29 M/S
ECHO LV POSTERIOR WALL: 0.8 CM (ref 0.6–1.1)
FRACTIONAL SHORTENING: 36 % (ref 28–44)
INTERVENTRICULAR SEPTUM: 0.82 CM (ref 0.6–1.1)
IVRT: 0.06 MSEC
LA MAJOR: 5.71 CM
LA MINOR: 5.82 CM
LA WIDTH: 3.88 CM
LEFT ATRIUM SIZE: 4.03 CM
LEFT ATRIUM VOLUME INDEX: 40.3 ML/M2
LEFT ATRIUM VOLUME: 76.62 CM3
LEFT INTERNAL DIMENSION IN SYSTOLE: 3.03 CM (ref 2.1–4)
LEFT VENTRICLE DIASTOLIC VOLUME INDEX: 54.36 ML/M2
LEFT VENTRICLE DIASTOLIC VOLUME: 103.34 ML
LEFT VENTRICLE MASS INDEX: 66 G/M2
LEFT VENTRICLE SYSTOLIC VOLUME INDEX: 18.8 ML/M2
LEFT VENTRICLE SYSTOLIC VOLUME: 35.83 ML
LEFT VENTRICULAR INTERNAL DIMENSION IN DIASTOLE: 4.72 CM (ref 3.5–6)
LEFT VENTRICULAR MASS: 125.14 G
LV LATERAL E/E' RATIO: 10.29 M/S
LV SEPTAL E/E' RATIO: 10.29 M/S
MV PEAK A VEL: 1.04 M/S
MV PEAK E VEL: 0.72 M/S
OHS CV CPX 1 MINUTE RECOVERY HEART RATE: 136 BPM
OHS CV CPX 85 PERCENT MAX PREDICTED HEART RATE MALE: 125
OHS CV CPX ESTIMATED METS: 12
OHS CV CPX MAX PREDICTED HEART RATE: 147
OHS CV CPX PATIENT IS FEMALE: 1
OHS CV CPX PATIENT IS MALE: 0
OHS CV CPX PEAK DIASTOLIC BLOOD PRESSURE: 47 MMHG
OHS CV CPX PEAK HEAR RATE: 150 BPM
OHS CV CPX PEAK RATE PRESSURE PRODUCT: NORMAL
OHS CV CPX PEAK SYSTOLIC BLOOD PRESSURE: 179 MMHG
OHS CV CPX PERCENT MAX PREDICTED HEART RATE ACHIEVED: 102
OHS CV CPX RATE PRESSURE PRODUCT PRESENTING: 9432
PISA TR MAX VEL: 2.45 M/S
PULM VEIN S/D RATIO: 2.21
PV PEAK D VEL: 0.29 M/S
PV PEAK S VEL: 0.64 M/S
RA MAJOR: 4.96 CM
RA WIDTH: 4.02 CM
RIGHT VENTRICULAR END-DIASTOLIC DIMENSION: 3.99 CM
RV TISSUE DOPPLER FREE WALL SYSTOLIC VELOCITY 1 (APICAL 4 CHAMBER VIEW): 18.15 CM/S
SINUS: 2.74 CM
STJ: 2.59 CM
STRESS ECHO POST EXERCISE DUR MIN: 7 MINUTES
STRESS ECHO POST EXERCISE DUR SEC: 28 SECONDS
STRESS ST DEPRESSION: 0.5 MM
SYSTOLIC BLOOD PRESSURE: 131 MMHG
TDI LATERAL: 0.07 M/S
TDI SEPTAL: 0.07 M/S
TDI: 0.07 M/S
TR MAX PG: 24 MMHG
TRICUSPID ANNULAR PLANE SYSTOLIC EXCURSION: 2.72 CM

## 2019-08-20 PROCEDURE — 93351 ECHOCARDIOGRAM STRESS TEST (CUPID ONLY): ICD-10-PCS | Mod: S$GLB,,, | Performed by: INTERNAL MEDICINE

## 2019-08-20 PROCEDURE — 93351 STRESS TTE COMPLETE: CPT | Mod: S$GLB,,, | Performed by: INTERNAL MEDICINE

## 2019-08-20 NOTE — TELEPHONE ENCOUNTER
Please schedule with cardiology for evaluation of arrhythmias/atrial enlargement present during stress test

## 2019-08-20 NOTE — TELEPHONE ENCOUNTER
----- Message from Gloria Mcdonald sent at 8/20/2019  3:49 PM CDT -----  Contact: self   Type:  Patient Returning Call    Who Called: MICHAEL PÉREZ [[id number]]    Who Left Message for Patient: Sheldon    Does the patient know what this is regarding?: Y     Best Call Back Number: [phone]    Additional Information:

## 2019-08-20 NOTE — NURSING NOTE
Pt here for MARION.  I was called into room during testing for NSVT.  Upon arrival into room, pt found to be lying on table having recovery images done.  Pt AAO x 3, denies any C/O CP, SOB, lightheadedness or dizziness, palpitations, etc.  EKG revealed 40 beat run of NSVT. Along w/ ventricular couplets & ventricular triplets.  Dr. Valderrama notified & in room to assess pt.  EKG's & echo images reviewed w/ Dr. Junior.  OK for pt to be discharged.  Dr. Junior requested that Dr. Byrnes be notified & recommendation made for pt to follow up w/ EP.  I spoke w/ Dr. Byrnes's MA.  I also updated pt on the plan, questions answered & understanding verbalized.  Dr. Byrnes's office to follow-up w/ pt.  Pt D/C'ed to home in stable condition.

## 2019-08-20 NOTE — TELEPHONE ENCOUNTER
Spoke with Basilia in echo lab, she wanted to inform us the staff doctor advised that patient should see an arrhythmia MD as well, and that patient was asymptomatic during teset.

## 2019-08-22 DIAGNOSIS — I47.29 MONOMORPHIC VENTRICULAR TACHYCARDIA: Primary | ICD-10-CM

## 2019-08-22 DIAGNOSIS — R07.9 CHEST PAIN, UNSPECIFIED TYPE: ICD-10-CM

## 2019-09-12 ENCOUNTER — TELEPHONE (OUTPATIENT)
Dept: ELECTROPHYSIOLOGY | Facility: CLINIC | Age: 69
End: 2019-09-12

## 2019-09-12 NOTE — TELEPHONE ENCOUNTER
Called & l/m for pt re scheduling Dr. Alaniz's consult apt.  Adv Dr. Alaniz has an apt available on Monday, 9/16, for 1:00.  Offered that apt, adv if she is unable to make Monday apt, we can schedule her for another day.

## 2019-09-18 ENCOUNTER — TELEPHONE (OUTPATIENT)
Dept: CARDIOLOGY | Facility: CLINIC | Age: 69
End: 2019-09-18

## 2019-09-18 ENCOUNTER — OFFICE VISIT (OUTPATIENT)
Dept: CARDIOLOGY | Facility: CLINIC | Age: 69
End: 2019-09-18
Payer: COMMERCIAL

## 2019-09-18 VITALS
SYSTOLIC BLOOD PRESSURE: 124 MMHG | BODY MASS INDEX: 31.77 KG/M2 | HEART RATE: 91 BPM | DIASTOLIC BLOOD PRESSURE: 58 MMHG | HEIGHT: 65 IN | WEIGHT: 190.69 LBS

## 2019-09-18 DIAGNOSIS — R94.39 ABNORMAL STRESS TEST: Primary | ICD-10-CM

## 2019-09-18 DIAGNOSIS — R00.2 PALPITATIONS: Primary | ICD-10-CM

## 2019-09-18 DIAGNOSIS — E11.9 CONTROLLED TYPE 2 DIABETES MELLITUS WITHOUT COMPLICATION, WITHOUT LONG-TERM CURRENT USE OF INSULIN: ICD-10-CM

## 2019-09-18 DIAGNOSIS — R00.0 WIDE-COMPLEX TACHYCARDIA: ICD-10-CM

## 2019-09-18 DIAGNOSIS — I10 ESSENTIAL HYPERTENSION: ICD-10-CM

## 2019-09-18 PROCEDURE — 3078F DIAST BP <80 MM HG: CPT | Mod: CPTII,S$GLB,, | Performed by: INTERNAL MEDICINE

## 2019-09-18 PROCEDURE — 3078F PR MOST RECENT DIASTOLIC BLOOD PRESSURE < 80 MM HG: ICD-10-PCS | Mod: CPTII,S$GLB,, | Performed by: INTERNAL MEDICINE

## 2019-09-18 PROCEDURE — 3074F SYST BP LT 130 MM HG: CPT | Mod: CPTII,S$GLB,, | Performed by: INTERNAL MEDICINE

## 2019-09-18 PROCEDURE — 99999 PR PBB SHADOW E&M-EST. PATIENT-LVL IV: ICD-10-PCS | Mod: PBBFAC,,, | Performed by: INTERNAL MEDICINE

## 2019-09-18 PROCEDURE — 1101F PT FALLS ASSESS-DOCD LE1/YR: CPT | Mod: CPTII,S$GLB,, | Performed by: INTERNAL MEDICINE

## 2019-09-18 PROCEDURE — 1101F PR PT FALLS ASSESS DOC 0-1 FALLS W/OUT INJ PAST YR: ICD-10-PCS | Mod: CPTII,S$GLB,, | Performed by: INTERNAL MEDICINE

## 2019-09-18 PROCEDURE — 99214 OFFICE O/P EST MOD 30 MIN: CPT | Mod: S$GLB,,, | Performed by: INTERNAL MEDICINE

## 2019-09-18 PROCEDURE — 3074F PR MOST RECENT SYSTOLIC BLOOD PRESSURE < 130 MM HG: ICD-10-PCS | Mod: CPTII,S$GLB,, | Performed by: INTERNAL MEDICINE

## 2019-09-18 PROCEDURE — 99214 PR OFFICE/OUTPT VISIT, EST, LEVL IV, 30-39 MIN: ICD-10-PCS | Mod: S$GLB,,, | Performed by: INTERNAL MEDICINE

## 2019-09-18 PROCEDURE — 99999 PR PBB SHADOW E&M-EST. PATIENT-LVL IV: CPT | Mod: PBBFAC,,, | Performed by: INTERNAL MEDICINE

## 2019-09-18 PROCEDURE — 3044F PR MOST RECENT HEMOGLOBIN A1C LEVEL <7.0%: ICD-10-PCS | Mod: CPTII,S$GLB,, | Performed by: INTERNAL MEDICINE

## 2019-09-18 PROCEDURE — 3044F HG A1C LEVEL LT 7.0%: CPT | Mod: CPTII,S$GLB,, | Performed by: INTERNAL MEDICINE

## 2019-09-18 RX ORDER — METOPROLOL SUCCINATE 50 MG/1
50 TABLET, EXTENDED RELEASE ORAL DAILY
Qty: 90 TABLET | Refills: 3 | Status: SHIPPED | OUTPATIENT
Start: 2019-09-18 | End: 2019-09-26 | Stop reason: ALTCHOICE

## 2019-09-18 RX ORDER — HYDROCHLOROTHIAZIDE 25 MG/1
25 TABLET ORAL DAILY
Qty: 90 TABLET | Refills: 3 | Status: SHIPPED | OUTPATIENT
Start: 2019-09-18 | End: 2020-02-20 | Stop reason: SDUPTHER

## 2019-09-18 NOTE — PROGRESS NOTES
Subjective:   Patient ID:  Jennifer Motley is a 68 y.o. female is a new patient who presents for evaluation of Chest Pain and PVC (Premature Ventricular Contractions)  Jennifer Motley is a 66 y.o. female is a new patient who presents for evaluation of Chest pain, unspecified type  HPI:   She is here with HTN, DM with chest pain and has been present for one week. It is an intermittent numb pain, it occurs in the afternoon and it does not radiate anywhere, it lasts a few minutes. No inciting or relieving factors. She denies nausea, vomiting, diarrhea or constipation with it. She does have a history of reflux and no burning or reflux symptoms. She is taking her BP medication consistently. Her mom and  currently recovering from surgery. She is allergic to tomato sauce. She does eat a lot of hot sauce. She has had labs with her endocrinologist one month ago.    Non smoker. Mother had HTN and high BP. Aunts have had MI at 50s and 60s.   Exercise stress echo  1. The EKG portion of this study is negative for ischemia at a high workload, and peak heart rate of 157 bpm (106% of predicted).   2. Blood pressure response to exercise was normal (Presenting BP: 140/57 Peak BP: 217/84).   3. No significant arrhythmias were present.   4. There were no symptoms of chest discomfort or significant dyspnea throughout the protocol.   5. The Khoury treadmill score was 3 suggesting an intermediate probability for future cardiovascular events.    1 - Normal left ventricular systolic function (EF 60-65%).     2 - Normal left ventricular diastolic function.     3 - Normal right ventricular systolic function .   No evidence of stress induced myocardial ischemia.     HPI:   Recent chest pain under a lot of stress.   Patient had a stress test done that shows 34 beats of wide complex tachycardia.     The 10-year ASCVD risk score (Eric GABY Jr., et al., 2013) is: 19.4%    Values used to calculate the score:      Age: 68 years      Sex: Female       "Is Non- : Yes      Diabetic: Yes      Tobacco smoker: No      Systolic Blood Pressure: 124 mmHg      Is BP treated: Yes      HDL Cholesterol: 59 mg/dL      Total Cholesterol: 180 mg/dL      Patient Active Problem List   Diagnosis    Thyroid cyst    Overactive bladder    Ocular hypertension    Obese    Hypertension    Controlled type 2 diabetes mellitus without complication, without long-term current use of insulin     BP (!) 124/58   Pulse 91   Ht 5' 5" (1.651 m)   Wt 86.5 kg (190 lb 11.2 oz)   BMI 31.73 kg/m²   Body mass index is 31.73 kg/m².  CrCl cannot be calculated (Patient's most recent lab result is older than the maximum 7 days allowed.).    Lab Results   Component Value Date     07/25/2019    K 4.4 07/25/2019     07/25/2019    CO2 28 07/25/2019    BUN 19 07/25/2019    CREATININE 0.8 07/25/2019     (H) 07/25/2019    HGBA1C 6.7 (H) 07/25/2019    AST 21 07/25/2019    ALT 29 07/25/2019    ALBUMIN 3.9 07/25/2019    PROT 7.8 07/25/2019    BILITOT 0.3 07/25/2019    CHOL 180 07/25/2019    HDL 59 07/25/2019    LDLCALC 104.2 07/25/2019    TRIG 84 07/25/2019       Current Outpatient Medications   Medication Sig    aspirin 81 MG Chew Take 81 mg by mouth once daily.    b complex vitamins capsule Take 1 capsule by mouth once daily.    calcium-vitamin D3 500 mg(1,250mg) -200 unit per tablet Take 1 tablet by mouth once daily.    cinnamon bark 500 mg capsule Take 500 mg by mouth once daily.    CRANBERRY FRUIT CONCENTRATE (AZO CRANBERRY ORAL) Take by mouth once daily at 6am.     dorzolamide-timolol 2-0.5% (COSOPT) 22.3-6.8 mg/mL ophthalmic solution Place 1 drop into both eyes 2 (two) times daily.     FLAXSEED ORAL Take 1 tablet by mouth once daily at 6am.     latanoprost 0.005 % ophthalmic solution Place 1 drop into both eyes once daily.     multivitamin capsule Take 1 capsule by mouth once daily.    trospium (SANCTURA XR) 60 mg Cp24 capsule 60 mg once daily.  "    FLUARIX QUAD 3790-0748, PF, 60 mcg (15 mcg x 4)/0.5 mL Syrg vaccine ADM 0.5ML IM UTD    hydroCHLOROthiazide (HYDRODIURIL) 25 MG tablet Take 1 tablet (25 mg total) by mouth once daily.    ibuprofen (ADVIL,MOTRIN) 800 MG tablet Take 1 tablet (800 mg total) by mouth every 8 (eight) hours as needed.    levocetirizine (XYZAL) 5 MG tablet Take 1 tablet (5 mg total) by mouth every evening.    metoprolol succinate (TOPROL-XL) 50 MG 24 hr tablet Take 1 tablet (50 mg total) by mouth once daily.     No current facility-administered medications for this visit.        Review of Systems   Constitution: Negative for chills, decreased appetite, malaise/fatigue, night sweats, weight gain and weight loss.   Eyes: Negative for blurred vision, double vision, visual disturbance and visual halos.   Cardiovascular: Negative for chest pain, claudication, cyanosis, dyspnea on exertion, irregular heartbeat, leg swelling, near-syncope, orthopnea, palpitations, paroxysmal nocturnal dyspnea and syncope.   Respiratory: Negative for cough, hemoptysis, snoring, sputum production and wheezing.    Endocrine: Negative for cold intolerance, heat intolerance, polydipsia and polyphagia.   Hematologic/Lymphatic: Negative for adenopathy and bleeding problem. Does not bruise/bleed easily.   Skin: Negative for flushing, itching, poor wound healing and rash.   Musculoskeletal: Negative for arthritis, back pain, falls, gout, joint pain, joint swelling, muscle cramps, muscle weakness, myalgias, neck pain and stiffness.   Gastrointestinal: Negative for bloating, abdominal pain, anorexia, diarrhea, dysphagia, excessive appetite, flatus, hematemesis, jaundice, melena and nausea.   Genitourinary: Negative for hesitancy and incomplete emptying.   Neurological: Negative for aphonia, brief paralysis, difficulty with concentration, disturbances in coordination, excessive daytime sleepiness, dizziness, focal weakness, light-headedness, loss of balance and  weakness.   Psychiatric/Behavioral: Negative for altered mental status, depression, hallucinations, hypervigilance, memory loss, substance abuse and suicidal ideas. The patient does not have insomnia and is not nervous/anxious.        Objective:   Physical Exam   Constitutional: She is oriented to person, place, and time. She appears well-developed and well-nourished. No distress.   HENT:   Head: Normocephalic and atraumatic.   Nose: Nose normal.   Mouth/Throat: Oropharynx is clear and moist. No oropharyngeal exudate.   Eyes: Pupils are equal, round, and reactive to light. Conjunctivae and EOM are normal. Right eye exhibits no discharge. Left eye exhibits no discharge. No scleral icterus.   Neck: Normal range of motion. Neck supple. No JVD present. No tracheal deviation present. No thyromegaly present.   Cardiovascular: Normal rate, regular rhythm, normal heart sounds and intact distal pulses. Exam reveals no gallop and no friction rub.   No murmur heard.  Pulmonary/Chest: Effort normal and breath sounds normal. No stridor. No respiratory distress. She has no wheezes. She has no rales. She exhibits no tenderness.   Abdominal: Soft. Bowel sounds are normal. She exhibits no distension and no mass. There is no tenderness. There is no rebound and no guarding.   Musculoskeletal: Normal range of motion. She exhibits no edema or tenderness.   Lymphadenopathy:     She has no cervical adenopathy.   Neurological: She is alert and oriented to person, place, and time. She has normal reflexes. No cranial nerve deficit. She exhibits normal muscle tone. Coordination normal.   Skin: Skin is warm. No rash noted. She is not diaphoretic. No erythema. No pallor.   Psychiatric: She has a normal mood and affect. Her behavior is normal. Judgment and thought content normal.       Assessment:     1. Abnormal stress test    2. Wide-complex tachycardia    3. Controlled type 2 diabetes mellitus without complication, without long-term current  use of insulin    4. Essential hypertension        Plan:   Jennifer was seen today for chest pain and pvc (premature ventricular contractions).    Diagnoses and all orders for this visit:    Abnormal stress test  -     Comprehensive metabolic panel; Future  -     Magnesium; Future  -     TSH; Future    Wide-complex tachycardia  -     Ambulatory Referral to Electrophysiology    Controlled type 2 diabetes mellitus without complication, without long-term current use of insulin    Essential hypertension    Other orders  -     hydroCHLOROthiazide (HYDRODIURIL) 25 MG tablet; Take 1 tablet (25 mg total) by mouth once daily.  -     metoprolol succinate (TOPROL-XL) 50 MG 24 hr tablet; Take 1 tablet (50 mg total) by mouth once daily.      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 Will send this patient to EP/

## 2019-09-18 NOTE — Clinical Note
Please let the patient know that case was  discussed with Cardiac EP and that you should see one of the electrical doctors in our group.

## 2019-09-18 NOTE — LETTER
September 23, 2019      Jaqueline Byrnes MD  [address]  [facility] [address]           [facility] - Cardiology  [address]  [place] [address]  Phone: [phone]          Patient: Jennifer Motley   MR Number: [id number]   YOB: 1950   Date of Visit: 9/18/2019       Dear Dr. Jaqueline Byrnes:    Thank you for referring Jennifer Motley to me for evaluation. Attached you will find relevant portions of my assessment and plan of care.    If you have questions, please do not hesitate to call me. I look forward to following Jennifer Motley along with you.    Sincerely,    Verónica Turk MD    Enclosure  CC:  No Recipients    If you would like to receive this communication electronically, please contact [email or web address] or [phone] to request more information on [employer] Link access.    For providers and/or their staff who would like to refer a patient to [facility], please contact us through our one-stop-shop provider referral line, [facility], at [phone].    If you feel you have received this communication in error or would no longer like to receive these types of communications, please e-mail [email or web address]

## 2019-09-20 DIAGNOSIS — E11.9 TYPE 2 DIABETES MELLITUS WITHOUT COMPLICATION: ICD-10-CM

## 2019-09-23 ENCOUNTER — LAB VISIT (OUTPATIENT)
Dept: LAB | Facility: HOSPITAL | Age: 69
End: 2019-09-23
Attending: INTERNAL MEDICINE
Payer: COMMERCIAL

## 2019-09-23 DIAGNOSIS — R94.39 ABNORMAL STRESS TEST: Primary | ICD-10-CM

## 2019-09-23 DIAGNOSIS — R94.39 ABNORMAL STRESS TEST: ICD-10-CM

## 2019-09-23 LAB
ALBUMIN SERPL BCP-MCNC: 4.3 G/DL (ref 3.5–5.2)
ALP SERPL-CCNC: 70 U/L (ref 55–135)
ALT SERPL W/O P-5'-P-CCNC: 30 U/L (ref 10–44)
ANION GAP SERPL CALC-SCNC: 7 MMOL/L (ref 8–16)
AST SERPL-CCNC: 25 U/L (ref 10–40)
BILIRUB SERPL-MCNC: 0.5 MG/DL (ref 0.1–1)
BUN SERPL-MCNC: 16 MG/DL (ref 8–23)
CALCIUM SERPL-MCNC: 10.1 MG/DL (ref 8.7–10.5)
CHLORIDE SERPL-SCNC: 104 MMOL/L (ref 95–110)
CO2 SERPL-SCNC: 30 MMOL/L (ref 23–29)
CREAT SERPL-MCNC: 0.8 MG/DL (ref 0.5–1.4)
EST. GFR  (AFRICAN AMERICAN): >60 ML/MIN/1.73 M^2
EST. GFR  (NON AFRICAN AMERICAN): >60 ML/MIN/1.73 M^2
GLUCOSE SERPL-MCNC: 94 MG/DL (ref 70–110)
MAGNESIUM SERPL-MCNC: 2 MG/DL (ref 1.6–2.6)
POTASSIUM SERPL-SCNC: 4 MMOL/L (ref 3.5–5.1)
PROT SERPL-MCNC: 8.2 G/DL (ref 6–8.4)
SODIUM SERPL-SCNC: 141 MMOL/L (ref 136–145)
TSH SERPL DL<=0.005 MIU/L-ACNC: 0.74 UIU/ML (ref 0.4–4)

## 2019-09-23 PROCEDURE — 84443 ASSAY THYROID STIM HORMONE: CPT

## 2019-09-23 PROCEDURE — 80053 COMPREHEN METABOLIC PANEL: CPT

## 2019-09-23 PROCEDURE — 36415 COLL VENOUS BLD VENIPUNCTURE: CPT

## 2019-09-23 PROCEDURE — 83735 ASSAY OF MAGNESIUM: CPT

## 2019-09-24 ENCOUNTER — TELEPHONE (OUTPATIENT)
Dept: CARDIOLOGY | Facility: CLINIC | Age: 69
End: 2019-09-24

## 2019-09-24 NOTE — TELEPHONE ENCOUNTER
----- Message from Verónica Turk MD sent at 9/24/2019 11:27 AM CDT -----  Please let patient know that her labs are normal

## 2019-09-25 ENCOUNTER — PATIENT OUTREACH (OUTPATIENT)
Dept: ADMINISTRATIVE | Facility: OTHER | Age: 69
End: 2019-09-25

## 2019-09-26 ENCOUNTER — INITIAL CONSULT (OUTPATIENT)
Dept: ELECTROPHYSIOLOGY | Facility: CLINIC | Age: 69
End: 2019-09-26
Payer: COMMERCIAL

## 2019-09-26 VITALS
DIASTOLIC BLOOD PRESSURE: 65 MMHG | HEIGHT: 64 IN | WEIGHT: 190 LBS | HEART RATE: 72 BPM | BODY MASS INDEX: 32.44 KG/M2 | SYSTOLIC BLOOD PRESSURE: 133 MMHG

## 2019-09-26 DIAGNOSIS — E11.9 CONTROLLED TYPE 2 DIABETES MELLITUS WITHOUT COMPLICATION, WITHOUT LONG-TERM CURRENT USE OF INSULIN: ICD-10-CM

## 2019-09-26 DIAGNOSIS — I10 ESSENTIAL HYPERTENSION: Primary | ICD-10-CM

## 2019-09-26 DIAGNOSIS — R94.39 ABNORMAL STRESS TEST: ICD-10-CM

## 2019-09-26 DIAGNOSIS — I47.29 IDIOPATHIC VENTRICULAR TACHYCARDIA: Chronic | ICD-10-CM

## 2019-09-26 PROCEDURE — 3078F PR MOST RECENT DIASTOLIC BLOOD PRESSURE < 80 MM HG: ICD-10-PCS | Mod: CPTII,S$GLB,, | Performed by: INTERNAL MEDICINE

## 2019-09-26 PROCEDURE — 93010 ELECTROCARDIOGRAM REPORT: CPT | Mod: S$GLB,,, | Performed by: INTERNAL MEDICINE

## 2019-09-26 PROCEDURE — 1101F PR PT FALLS ASSESS DOC 0-1 FALLS W/OUT INJ PAST YR: ICD-10-PCS | Mod: CPTII,S$GLB,, | Performed by: INTERNAL MEDICINE

## 2019-09-26 PROCEDURE — 1101F PT FALLS ASSESS-DOCD LE1/YR: CPT | Mod: CPTII,S$GLB,, | Performed by: INTERNAL MEDICINE

## 2019-09-26 PROCEDURE — 3075F PR MOST RECENT SYSTOLIC BLOOD PRESS GE 130-139MM HG: ICD-10-PCS | Mod: CPTII,S$GLB,, | Performed by: INTERNAL MEDICINE

## 2019-09-26 PROCEDURE — 99204 PR OFFICE/OUTPT VISIT, NEW, LEVL IV, 45-59 MIN: ICD-10-PCS | Mod: S$GLB,,, | Performed by: INTERNAL MEDICINE

## 2019-09-26 PROCEDURE — 3075F SYST BP GE 130 - 139MM HG: CPT | Mod: CPTII,S$GLB,, | Performed by: INTERNAL MEDICINE

## 2019-09-26 PROCEDURE — 99999 PR PBB SHADOW E&M-EST. PATIENT-LVL III: CPT | Mod: PBBFAC,,, | Performed by: INTERNAL MEDICINE

## 2019-09-26 PROCEDURE — 3044F PR MOST RECENT HEMOGLOBIN A1C LEVEL <7.0%: ICD-10-PCS | Mod: CPTII,S$GLB,, | Performed by: INTERNAL MEDICINE

## 2019-09-26 PROCEDURE — 93005 ELECTROCARDIOGRAM TRACING: CPT | Mod: S$GLB,,, | Performed by: INTERNAL MEDICINE

## 2019-09-26 PROCEDURE — 99204 OFFICE O/P NEW MOD 45 MIN: CPT | Mod: S$GLB,,, | Performed by: INTERNAL MEDICINE

## 2019-09-26 PROCEDURE — 99999 PR PBB SHADOW E&M-EST. PATIENT-LVL III: ICD-10-PCS | Mod: PBBFAC,,, | Performed by: INTERNAL MEDICINE

## 2019-09-26 PROCEDURE — 3044F HG A1C LEVEL LT 7.0%: CPT | Mod: CPTII,S$GLB,, | Performed by: INTERNAL MEDICINE

## 2019-09-26 PROCEDURE — 93005 RHYTHM STRIP: ICD-10-PCS | Mod: S$GLB,,, | Performed by: INTERNAL MEDICINE

## 2019-09-26 PROCEDURE — 3078F DIAST BP <80 MM HG: CPT | Mod: CPTII,S$GLB,, | Performed by: INTERNAL MEDICINE

## 2019-09-26 PROCEDURE — 93010 RHYTHM STRIP: ICD-10-PCS | Mod: S$GLB,,, | Performed by: INTERNAL MEDICINE

## 2019-09-26 RX ORDER — VERAPAMIL HYDROCHLORIDE 180 MG/1
180 CAPSULE, EXTENDED RELEASE ORAL DAILY
Qty: 30 CAPSULE | Refills: 11 | Status: SHIPPED | OUTPATIENT
Start: 2019-09-26 | End: 2020-08-27 | Stop reason: ALTCHOICE

## 2019-09-26 NOTE — PROGRESS NOTES
Subjective:    Patient ID:  Jennifer Motley is a 68 y.o. female who presents for evaluation of Tachycardia    Referring Cardiologist: Verónica Turk MD  Primary Care Physician: Jaqueline Byrnes MD    HPI  I had the pleasure of seeing Mrs. Motley today in our electrophysiology clinic in consultation for her ventricular arrhythmia. As you are aware she is a pleasant 68 year-old woman with obesity, hypertension and type 2 diabetes mellitus. She was first assessed by Dr. Turk in 2017 when she presented for evaluation of atypical chest pain. A stress echocardiogram was performed noting a normal LVEF, no arrhythmias during exercise, no ischemia, and a hypertensive response at peak stress (SBP 217mmHg). She was seen by Dr. Byrnes 7/2019 and reported burning type chest discomfort during stressful situations. A treadmill stress echocardiogram was ordered and performed on 8/20/2019 which showed a preserved LVEF and no ischemia however with peak exercise she developed PVCs, salvos of monomorphic VT and 1 34-beat run of monomorphic VT all of the same etiology of the PVCs (LBBB, V3 transition, inferiorly directed). She was asymptomatic from this event during her stress test. She denies any history of near syncope/syncope. She denies palpitations or exertional chest discomfort/palpitations/light-headedness. She regularly exercises on a treadmill.    I reviewed available ECGs in Epic including the stress tests. ECGs consistent with sinus rhythm with normal QRS and intervals. PVCs/VT only noted on recent stress test.    My interpretation of today's in clinic ECG is normal sinus rhythm with normal intervals.    Review of Systems   Constitution: Negative for fever and malaise/fatigue.   HENT: Negative for congestion and sore throat.    Eyes: Negative for blurred vision and visual disturbance.   Cardiovascular: Positive for chest pain (per HPI). Negative for dyspnea on exertion, irregular heartbeat, leg swelling,  near-syncope, orthopnea, palpitations, paroxysmal nocturnal dyspnea and syncope.   Respiratory: Negative for cough and shortness of breath.    Hematologic/Lymphatic: Negative for bleeding problem. Does not bruise/bleed easily.   Skin: Negative.    Musculoskeletal: Negative.    Gastrointestinal: Negative for bloating and abdominal pain.   Neurological: Negative for dizziness and focal weakness.        Objective:    Physical Exam   Constitutional: She is oriented to person, place, and time. She appears well-developed and well-nourished. No distress.   HENT:   Head: Normocephalic and atraumatic.   Eyes: Conjunctivae are normal. Right eye exhibits no discharge. Left eye exhibits no discharge.   Neck: Neck supple. No JVD present.   Cardiovascular: Normal rate and regular rhythm. Exam reveals no gallop and no friction rub.   No murmur heard.  Pulmonary/Chest: Effort normal and breath sounds normal. No respiratory distress. She has no wheezes. She has no rales.   Abdominal: Soft. Bowel sounds are normal. She exhibits no distension. There is no tenderness. There is no rebound.   Musculoskeletal: She exhibits no edema.   Neurological: She is alert and oriented to person, place, and time.   Skin: Skin is warm and dry. She is not diaphoretic.   Psychiatric: She has a normal mood and affect. Her behavior is normal. Judgment and thought content normal.   Vitals reviewed.        Assessment:       1. Essential hypertension    2. Idiopathic ventricular tachycardia    3. Controlled type 2 diabetes mellitus without complication, without long-term current use of insulin         Plan:       In summary, Mrs. Motley is a pleasant 68 year-old woman with obesity, hypertension and type 2 diabetes mellitus presenting for evaluation of PVCs and idiopathic VT of the same etiology on an exercise stress test performed for chest discomfort aypical for angina/ischemia. She has no evidence of ischemia on the stress test and has a preserved LVEF. She  has no history of near syncope or syncope. Her PVC/VT focus is consistent with an outflow tract source, with a V2 transition ratio favoring RVOT. Discussed prognosis and morbidity with idiopathic VT (can carry a risk of syncope however overall low risk of increased mortality) and treatment options. Discussed initiating verapamil +/- some form of monitoring (would recommend loop recorder) versus EPS with attempt at induction/mapping/ablation (would use isoproterenol to attempt to induce). She is interested in an initial conservative approach. Will start verapamil 180mg daily instead of verapamil and get a 24 hour holter monitor. Recommend an ILR after. She will think about it.    RTC in 6-8 weeks if she decides not to proceed with ILR    Thank you for allowing me to participate in the care of this patient. Please do not hesitate to call me with any questions or concerns.    Gerhard Cohen MD, PhD  Cardiac Electrophysiology

## 2019-09-26 NOTE — LETTER
September 26, 2019      Verónica Turk MD  [address]  [address]  [place] [address]           [facility] - [facility]hythm[facility]  [address]  [facility] [address]  Phone: [phone]  Fax: [phone]          Patient: Jennifer Motley   MR Number: [id number]   YOB: 1950   Date of Visit: 9/26/2019       Dear Dr. Verónica Turk:    Thank you for referring Jennifer Motley to me for evaluation. Attached you will find relevant portions of my assessment and plan of care.    If you have questions, please do not hesitate to call me. I look forward to following Jennifer Motley along with you.    Sincerely,    Gerhard Cohen MD    Enclosure  CC:  No Recipients    If you would like to receive this communication electronically, please contact [email or web address] or [phone] to request more information on [email or web address] Link access.    For providers and/or their staff who would like to refer a patient to [facility], please contact us through our one-stop-shop provider referral line, [facility], at [phone].    If you feel you have received this communication in error or would no longer like to receive these types of communications, please e-mail [email or web address]

## 2019-09-30 ENCOUNTER — CLINICAL SUPPORT (OUTPATIENT)
Dept: CARDIOLOGY | Facility: HOSPITAL | Age: 69
End: 2019-09-30
Attending: INTERNAL MEDICINE
Payer: COMMERCIAL

## 2019-09-30 DIAGNOSIS — I47.29 IDIOPATHIC VENTRICULAR TACHYCARDIA: ICD-10-CM

## 2019-09-30 PROCEDURE — 93227 XTRNL ECG REC<48 HR R&I: CPT | Mod: ,,, | Performed by: INTERNAL MEDICINE

## 2019-09-30 PROCEDURE — 93227 HOLTER MONITOR - 24 HOUR (CUPID ONLY): ICD-10-PCS | Mod: ,,, | Performed by: INTERNAL MEDICINE

## 2019-09-30 PROCEDURE — 93226 XTRNL ECG REC<48 HR SCAN A/R: CPT

## 2019-10-04 LAB
OHS CV EVENT MONITOR DAY: 0
OHS CV HOLTER LENGTH DECIMAL HOURS: 24
OHS CV HOLTER LENGTH HOURS: 24
OHS CV HOLTER LENGTH MINUTES: 0

## 2019-10-07 ENCOUNTER — TELEPHONE (OUTPATIENT)
Dept: ELECTROPHYSIOLOGY | Facility: CLINIC | Age: 69
End: 2019-10-07

## 2019-10-07 NOTE — TELEPHONE ENCOUNTER
Notified patient of her holter results. Recommend ILR implant as discussed in clinic. Mrs. Motley would like to think about it some more. Will have my nurse contact her in a week.

## 2019-10-09 ENCOUNTER — PATIENT MESSAGE (OUTPATIENT)
Dept: ELECTROPHYSIOLOGY | Facility: CLINIC | Age: 69
End: 2019-10-09

## 2019-10-10 NOTE — TELEPHONE ENCOUNTER
Spoke to Mrs. Motley.  All questions answered regarding ILR implant.  She would like to pencil date of 11/22 with 10 am arrival time for now and will call to cancel if necessary.  She will call me with any further questions or concerns.

## 2019-10-16 NOTE — TELEPHONE ENCOUNTER
Spoke to Mrs. Motley to touch base.  Last week when we spoke, she had many questions.  Checked in with her to make sure she didn't have any further questions.  She reports all her questions were answered and she will call our office if any additional questions come up.  She appreciated check in.

## 2019-10-18 ENCOUNTER — PATIENT MESSAGE (OUTPATIENT)
Dept: INTERNAL MEDICINE | Facility: CLINIC | Age: 69
End: 2019-10-18

## 2019-11-08 ENCOUNTER — PATIENT OUTREACH (OUTPATIENT)
Dept: ADMINISTRATIVE | Facility: OTHER | Age: 69
End: 2019-11-08

## 2019-11-08 DIAGNOSIS — E11.9 TYPE 2 DIABETES MELLITUS WITHOUT COMPLICATION: ICD-10-CM

## 2019-11-11 ENCOUNTER — OFFICE VISIT (OUTPATIENT)
Dept: CARDIOLOGY | Facility: CLINIC | Age: 69
End: 2019-11-11
Payer: COMMERCIAL

## 2019-11-11 VITALS
BODY MASS INDEX: 32.27 KG/M2 | DIASTOLIC BLOOD PRESSURE: 67 MMHG | WEIGHT: 189 LBS | HEIGHT: 64 IN | SYSTOLIC BLOOD PRESSURE: 128 MMHG | HEART RATE: 77 BPM

## 2019-11-11 DIAGNOSIS — E66.9 CLASS 1 OBESITY WITHOUT SERIOUS COMORBIDITY WITH BODY MASS INDEX (BMI) OF 32.0 TO 32.9 IN ADULT, UNSPECIFIED OBESITY TYPE: ICD-10-CM

## 2019-11-11 DIAGNOSIS — I10 ESSENTIAL HYPERTENSION: ICD-10-CM

## 2019-11-11 DIAGNOSIS — I47.29 IDIOPATHIC VENTRICULAR TACHYCARDIA: Primary | Chronic | ICD-10-CM

## 2019-11-11 PROCEDURE — 99204 PR OFFICE/OUTPT VISIT, NEW, LEVL IV, 45-59 MIN: ICD-10-PCS | Mod: S$GLB,,, | Performed by: INTERNAL MEDICINE

## 2019-11-11 PROCEDURE — 93000 ELECTROCARDIOGRAM COMPLETE: CPT | Mod: S$GLB,,, | Performed by: INTERNAL MEDICINE

## 2019-11-11 PROCEDURE — 3078F DIAST BP <80 MM HG: CPT | Mod: CPTII,S$GLB,, | Performed by: INTERNAL MEDICINE

## 2019-11-11 PROCEDURE — 93000 EKG 12-LEAD: ICD-10-PCS | Mod: S$GLB,,, | Performed by: INTERNAL MEDICINE

## 2019-11-11 PROCEDURE — 1101F PT FALLS ASSESS-DOCD LE1/YR: CPT | Mod: CPTII,S$GLB,, | Performed by: INTERNAL MEDICINE

## 2019-11-11 PROCEDURE — 99204 OFFICE O/P NEW MOD 45 MIN: CPT | Mod: S$GLB,,, | Performed by: INTERNAL MEDICINE

## 2019-11-11 PROCEDURE — 1101F PR PT FALLS ASSESS DOC 0-1 FALLS W/OUT INJ PAST YR: ICD-10-PCS | Mod: CPTII,S$GLB,, | Performed by: INTERNAL MEDICINE

## 2019-11-11 PROCEDURE — 3078F PR MOST RECENT DIASTOLIC BLOOD PRESSURE < 80 MM HG: ICD-10-PCS | Mod: CPTII,S$GLB,, | Performed by: INTERNAL MEDICINE

## 2019-11-11 PROCEDURE — 99999 PR PBB SHADOW E&M-EST. PATIENT-LVL III: ICD-10-PCS | Mod: PBBFAC,,, | Performed by: INTERNAL MEDICINE

## 2019-11-11 PROCEDURE — 3074F PR MOST RECENT SYSTOLIC BLOOD PRESSURE < 130 MM HG: ICD-10-PCS | Mod: CPTII,S$GLB,, | Performed by: INTERNAL MEDICINE

## 2019-11-11 PROCEDURE — 3074F SYST BP LT 130 MM HG: CPT | Mod: CPTII,S$GLB,, | Performed by: INTERNAL MEDICINE

## 2019-11-11 PROCEDURE — 99999 PR PBB SHADOW E&M-EST. PATIENT-LVL III: CPT | Mod: PBBFAC,,, | Performed by: INTERNAL MEDICINE

## 2019-11-11 NOTE — PROGRESS NOTES
Subjective:   Patient ID:  Jennifer Motley is a 68 y.o. female who presents for evaluation of PVCs; Tachycardia; and NSVT during stress test      HPI:         She is here for reassurance regarding the management of NSVT noted during a stress. She saw my partners Dr. Turk and Dr. Cohen for evaluation. She was started on verapamil. A loop recorder was recommended but she was hesitant.     A treadmill stress echocardiogram was ordered and performed on 2019 which showed a preserved LVEF and no ischemia however with peak exercise she developed PVCs, monomorphic VT and 1 34-beat run of monomorphic VT all of the same etiology of the PVCs (LBBB, V3 transition, inferiorly directed). She was asymptomatic. She denies any previous cardiovascular issues.       Holter 2019: rate PVCs      Stress echo 2019     Normal EF   Mild bi-atrial enlargement   ECG negative for ischemia      ECG 2019: NSR  + non specific ST changes       Care team:    Dr. Noel Turk        Patient Active Problem List    Diagnosis Date Noted    Idiopathic ventricular tachycardia 2019    Thyroid cyst     Overactive bladder     Ocular hypertension     Obese     Hypertension     Controlled type 2 diabetes mellitus without complication, without long-term current use of insulin            Right Arm BP - Sittin/71  Left Arm BP - Sittin/68        LABS      LAST HbA1c  Lab Results   Component Value Date    HGBA1C 6.7 (H) 2019       Lipid panel  Lab Results   Component Value Date    CHOL 180 2019     Lab Results   Component Value Date    HDL 59 2019     Lab Results   Component Value Date    LDLCALC 104.2 2019     Lab Results   Component Value Date    TRIG 84 2019     Lab Results   Component Value Date    CHOLHDL 32.8 2019            Review of Systems   Constitution: Negative for diaphoresis, night sweats, weight gain and weight loss.   HENT: Negative for  congestion.    Eyes: Negative for blurred vision, discharge and double vision.   Cardiovascular: Negative for chest pain, claudication, cyanosis, dyspnea on exertion, irregular heartbeat, leg swelling, near-syncope, orthopnea, palpitations, paroxysmal nocturnal dyspnea and syncope.   Respiratory: Negative for cough, shortness of breath and wheezing.    Endocrine: Negative for cold intolerance, heat intolerance and polyphagia.   Hematologic/Lymphatic: Negative for adenopathy and bleeding problem. Does not bruise/bleed easily.   Skin: Negative for dry skin and nail changes.   Musculoskeletal: Negative for arthritis, back pain, falls, joint pain, myalgias and neck pain.   Gastrointestinal: Negative for bloating, abdominal pain, change in bowel habit and constipation.   Genitourinary: Negative for bladder incontinence, dysuria, flank pain, genital sores and missed menses.   Neurological: Negative for aphonia, brief paralysis, difficulty with concentration, dizziness and weakness.   Psychiatric/Behavioral: Negative for altered mental status and memory loss. The patient does not have insomnia.    Allergic/Immunologic: Negative for environmental allergies.       Objective:   Physical Exam   Constitutional: She is oriented to person, place, and time. She appears well-developed and well-nourished. She is not intubated.   HENT:   Head: Normocephalic and atraumatic.   Right Ear: External ear normal.   Left Ear: External ear normal.   Mouth/Throat: Oropharynx is clear and moist.   Eyes: Pupils are equal, round, and reactive to light. Conjunctivae and EOM are normal. Right eye exhibits no discharge. Left eye exhibits no discharge. No scleral icterus.   Neck: Normal range of motion. Neck supple. Normal carotid pulses, no hepatojugular reflux and no JVD present. Carotid bruit is not present. No tracheal deviation present. No thyromegaly present.   Cardiovascular: Normal rate, regular rhythm, S1 normal and S2 normal.  No  extrasystoles are present. PMI is not displaced. Exam reveals no gallop, no S3, no distant heart sounds, no friction rub and no midsystolic click.   No murmur heard.  Pulses:       Carotid pulses are 2+ on the right side, and 2+ on the left side.       Radial pulses are 2+ on the right side, and 2+ on the left side.        Femoral pulses are 2+ on the right side, and 2+ on the left side.       Popliteal pulses are 2+ on the right side, and 2+ on the left side.        Dorsalis pedis pulses are 2+ on the right side, and 2+ on the left side.        Posterior tibial pulses are 2+ on the right side, and 2+ on the left side.   Pulmonary/Chest: Effort normal and breath sounds normal. No accessory muscle usage or stridor. No apnea, no tachypnea and no bradypnea. She is not intubated. No respiratory distress. She has no decreased breath sounds. She has no wheezes. She has no rales. She exhibits no tenderness and no bony tenderness.   Abdominal: She exhibits no distension, no pulsatile liver, no abdominal bruit, no ascites, no pulsatile midline mass and no mass. There is no tenderness. There is no rebound and no guarding.   Musculoskeletal: Normal range of motion. She exhibits no edema or tenderness.   Lymphadenopathy:     She has no cervical adenopathy.   Neurological: She is alert and oriented to person, place, and time. She has normal reflexes. No cranial nerve deficit. Coordination normal.   Skin: Skin is warm. No rash noted. No erythema. No pallor.   Psychiatric: She has a normal mood and affect. Her behavior is normal. Judgment and thought content normal.       Assessment:     1. Idiopathic ventricular tachycardia    2. Essential hypertension    3. Class 1 obesity without serious comorbidity with body mass index (BMI) of 32.0 to 32.9 in adult, unspecified obesity type        Plan:       She was reassured   For further evaluation a loop recorder will be extremely helpful   We will diagnose any arrhythmias even if she is  not symptomatic      She will proceed with loop recorder as scheduled  Continue with verapamil  Weight loss  Exercise        Continue with current medical plan and lifestyle changes.  Return sooner for concerns or questions. If symptoms persist go to the ED  I have reviewed all pertinent data on this patient       I have reviewed the patient's medical history in detail and updated the computerized patient record.    Orders Placed This Encounter   Procedures    IN OFFICE EKG 12-LEAD (to Muse)     Order Specific Question:   Diagnosis     Answer:   PVC (premature ventricular contraction) [410211]       Follow up as scheduled. Return sooner for concerns or questions            She expressed verbal understanding and agreed with the plan        Patient's Medications   New Prescriptions    No medications on file   Previous Medications    ASPIRIN 81 MG CHEW    Take 81 mg by mouth once daily.    B COMPLEX VITAMINS CAPSULE    Take 1 capsule by mouth once daily.    CALCIUM-VITAMIN D3 500 MG(1,250MG) -200 UNIT PER TABLET    Take 1 tablet by mouth once daily.    CINNAMON BARK 500 MG CAPSULE    Take 500 mg by mouth once daily.    CRANBERRY FRUIT CONCENTRATE (AZO CRANBERRY ORAL)    Take by mouth once daily at 6am.     DORZOLAMIDE-TIMOLOL 2-0.5% (COSOPT) 22.3-6.8 MG/ML OPHTHALMIC SOLUTION    Place 1 drop into both eyes 2 (two) times daily.     FLAXSEED ORAL    Take 1 tablet by mouth once daily at 6am.     FLUARIX QUAD 2468-3122, PF, 60 MCG (15 MCG X 4)/0.5 ML SYRG VACCINE    ADM 0.5ML IM UTD    HYDROCHLOROTHIAZIDE (HYDRODIURIL) 25 MG TABLET    Take 1 tablet (25 mg total) by mouth once daily.    IBUPROFEN (ADVIL,MOTRIN) 800 MG TABLET    Take 1 tablet (800 mg total) by mouth every 8 (eight) hours as needed.    LATANOPROST 0.005 % OPHTHALMIC SOLUTION    Place 1 drop into both eyes once daily.     LEVOCETIRIZINE (XYZAL) 5 MG TABLET    Take 1 tablet (5 mg total) by mouth every evening.    MULTIVITAMIN CAPSULE    Take 1 capsule by mouth  once daily.    TROSPIUM (SANCTURA XR) 60 MG CP24 CAPSULE    60 mg once daily.     VERAPAMIL (VERELAN) 180 MG C24P    Take 1 capsule (180 mg total) by mouth once daily.   Modified Medications    No medications on file   Discontinued Medications    No medications on file

## 2019-11-14 ENCOUNTER — PATIENT MESSAGE (OUTPATIENT)
Dept: ELECTROPHYSIOLOGY | Facility: CLINIC | Age: 69
End: 2019-11-14

## 2019-11-14 DIAGNOSIS — I47.29 IDIOPATHIC VENTRICULAR TACHYCARDIA: Primary | ICD-10-CM

## 2019-11-21 ENCOUNTER — TELEPHONE (OUTPATIENT)
Dept: ELECTROPHYSIOLOGY | Facility: CLINIC | Age: 69
End: 2019-11-21

## 2019-11-21 NOTE — TELEPHONE ENCOUNTER
Spoke to Jennifer Motley    CONFIRMED procedure arrival time of 9am on 11/22  Reiterated instructions including:  -Directions to check in desk  -Confirmed no recent fever, bleeding, infection or skin rash in the past 30 days  -Bathe night prior and morning prior to procedure with Hibiclens solution or an antibacterial soap     Pt verbalizes understanding of above and appreciates call.

## 2019-11-22 ENCOUNTER — HOSPITAL ENCOUNTER (OUTPATIENT)
Facility: HOSPITAL | Age: 69
Discharge: HOME | End: 2019-11-22
Attending: INTERNAL MEDICINE | Admitting: INTERNAL MEDICINE
Payer: COMMERCIAL

## 2019-11-22 VITALS
SYSTOLIC BLOOD PRESSURE: 167 MMHG | OXYGEN SATURATION: 96 % | HEIGHT: 64 IN | BODY MASS INDEX: 32.13 KG/M2 | TEMPERATURE: 96 F | HEART RATE: 59 BPM | WEIGHT: 188.19 LBS | DIASTOLIC BLOOD PRESSURE: 70 MMHG | RESPIRATION RATE: 18 BRPM

## 2019-11-22 DIAGNOSIS — I47.20 VENTRICULAR TACHYCARDIA: Primary | ICD-10-CM

## 2019-11-22 DIAGNOSIS — I47.20 VT (VENTRICULAR TACHYCARDIA): ICD-10-CM

## 2019-11-22 LAB — POCT GLUCOSE: 97 MG/DL (ref 70–110)

## 2019-11-22 PROCEDURE — 33285 INSJ SUBQ CAR RHYTHM MNTR: CPT | Performed by: INTERNAL MEDICINE

## 2019-11-22 PROCEDURE — 33285 INSJ SUBQ CAR RHYTHM MNTR: CPT | Mod: ,,, | Performed by: INTERNAL MEDICINE

## 2019-11-22 PROCEDURE — 93005 ELECTROCARDIOGRAM TRACING: CPT

## 2019-11-22 PROCEDURE — C1764 EVENT RECORDER, CARDIAC: HCPCS | Performed by: INTERNAL MEDICINE

## 2019-11-22 PROCEDURE — 25000003 PHARM REV CODE 250: Performed by: INTERNAL MEDICINE

## 2019-11-22 PROCEDURE — 93010 ELECTROCARDIOGRAM REPORT: CPT | Mod: ,,, | Performed by: INTERNAL MEDICINE

## 2019-11-22 PROCEDURE — 33285 PR INSERTION,SUBQ CARDIAC RHYTHM MONITOR, W/PRGRMG: ICD-10-PCS | Mod: ,,, | Performed by: INTERNAL MEDICINE

## 2019-11-22 PROCEDURE — 82962 GLUCOSE BLOOD TEST: CPT

## 2019-11-22 PROCEDURE — 93010 EKG 12-LEAD: ICD-10-PCS | Mod: ,,, | Performed by: INTERNAL MEDICINE

## 2019-11-22 PROCEDURE — 63600175 PHARM REV CODE 636 W HCPCS: Performed by: NURSE PRACTITIONER

## 2019-11-22 DEVICE — MON LNQ11 REVEAL LINQ USA FW2.0
Type: IMPLANTABLE DEVICE | Site: HEART | Status: FUNCTIONAL
Brand: REVEAL LINQ™

## 2019-11-22 RX ORDER — LIDOCAINE HYDROCHLORIDE AND EPINEPHRINE 20; 10 MG/ML; UG/ML
INJECTION, SOLUTION INFILTRATION; PERINEURAL
Status: DISCONTINUED | OUTPATIENT
Start: 2019-11-22 | End: 2019-11-22 | Stop reason: HOSPADM

## 2019-11-22 RX ORDER — CEFAZOLIN SODIUM 1 G/3ML
2 INJECTION, POWDER, FOR SOLUTION INTRAMUSCULAR; INTRAVENOUS
Status: COMPLETED | OUTPATIENT
Start: 2019-11-22 | End: 2019-11-22

## 2019-11-22 NOTE — SUBJECTIVE & OBJECTIVE
Past Medical History:   Diagnosis Date    Diabetes mellitus type II, controlled     Hypertension     Obese     Ocular hypertension     Overactive bladder     Thyroid cyst        Past Surgical History:   Procedure Laterality Date    none         Review of patient's allergies indicates:   Allergen Reactions    Citric acid Hives       No current facility-administered medications on file prior to encounter.      Current Outpatient Medications on File Prior to Encounter   Medication Sig    aspirin 81 MG Chew Take 81 mg by mouth once daily.    b complex vitamins capsule Take 1 capsule by mouth once daily.    calcium-vitamin D3 500 mg(1,250mg) -200 unit per tablet Take 1 tablet by mouth once daily.    cinnamon bark 500 mg capsule Take 500 mg by mouth once daily.    dorzolamide-timolol 2-0.5% (COSOPT) 22.3-6.8 mg/mL ophthalmic solution Place 1 drop into both eyes 2 (two) times daily.     FLAXSEED ORAL Take 1 tablet by mouth once daily at 6am.     hydroCHLOROthiazide (HYDRODIURIL) 25 MG tablet Take 1 tablet (25 mg total) by mouth once daily.    ibuprofen (ADVIL,MOTRIN) 800 MG tablet Take 1 tablet (800 mg total) by mouth every 8 (eight) hours as needed.    latanoprost 0.005 % ophthalmic solution Place 1 drop into both eyes once daily.     multivitamin capsule Take 1 capsule by mouth once daily.    trospium (SANCTURA XR) 60 mg Cp24 capsule 60 mg once daily.     verapamil (VERELAN) 180 MG C24P Take 1 capsule (180 mg total) by mouth once daily.    CRANBERRY FRUIT CONCENTRATE (AZO CRANBERRY ORAL) Take by mouth once daily at 6am.     FLUARIX QUAD 2613-6444, PF, 60 mcg (15 mcg x 4)/0.5 mL Syrg vaccine ADM 0.5ML IM UTD     Family History     Problem Relation (Age of Onset)    Coronary artery disease Sister, Brother    Dementia Sister    Diabetes Father, Sister    Glaucoma Mother    Heart attack Sister, Brother    Hypertension Mother    Stroke Mother, Brother        Tobacco Use    Smoking status: Never Smoker     Smokeless tobacco: Never Used   Substance and Sexual Activity    Alcohol use: Yes     Alcohol/week: 2.0 - 3.0 standard drinks     Types: 2 - 3 Standard drinks or equivalent per week    Drug use: No    Sexual activity: Yes     Partners: Male     ROS   Constitution: Negative for fevers/chills.   Negative for weakness  HENT: Negative for ear pain and tinnitus.   Eyes: Negative for blurred vision.   Cardiovascular: . Negative for chest pain,  near-syncope, and syncope.   Respiratory:  Negative  for shortness of breath   Endocrine:  Negative for polyuria.   Hematologic/Lymphatic: Negative for bruise/bleed easily.   Skin:  Negative for rash.   Musculoskeletal: Negative for joint pain and muscle weakness.   Extremity: Negative for swelling in the lower extremities.   Gastrointestinal:  Negative for abdominal pain and change in bowel habit.   Genitourinary: Negative for frequency.   Neurological:  Negative for dizziness.   Psychiatric/Behavioral:  Negative for depression, anxiety       Objective:     Vital Signs (Most Recent):  Temp: 96.8 °F (36 °C) (11/22/19 0951)  Pulse: 66 (11/22/19 0951)  Resp: 18 (11/22/19 0951)  BP: 133/71 (11/22/19 0953)  SpO2: 96 % (11/22/19 0951) Vital Signs (24h Range):  Temp:  [96.8 °F (36 °C)] 96.8 °F (36 °C)  Pulse:  [66] 66  Resp:  [18] 18  SpO2:  [96 %] 96 %  BP: (130-133)/(70-71) 133/71       Weight: 85.4 kg (188 lb 3.2 oz)  Body mass index is 32.3 kg/m².    SpO2: 96 %  O2 Device (Oxygen Therapy): room air    Physical Exam  General: Well developed, well nourished, No distress on room air   HEENT: Head is normocephalic, atraumatic;  Lungs: Clear to auscultation bilaterally.  No wheezing. Normal respiratory effort.  Cardiovascular:  S1 S2 Normal rate, regular rhythm and normal heart sounds.    PMI is not displaced  Extremities: No LE edema. Pulses 2+ and symmetric.   Abdomen: Abdomen is soft, non-tender non-distended with normal bowel sounds.  Skin: Skin color, texture, turgor normal.  No rashes.  Musculoskeletal: normal range of motion   Neurologic: Normal strength and tone. No focal numbness or weakness.   Psychiatric: normal mood and affect.  behavior is normal. Alert and oriented X 3.  Labs reviewed     Significant Imaging: chart reviewed

## 2019-11-22 NOTE — HPI
69 year-old woman with PMH  obesity, hypertension and type 2 diabetes mellitus. She was first assessed by Dr. Turk in 2017 when she presented for evaluation of atypical chest pain. A stress echocardiogram was performed noting a normal LVEF, no arrhythmias during exercise, no ischemia, and a hypertensive response at peak stress (SBP 217mmHg). She was seen by Dr. Byrnes 7/2019 and reported burning type chest discomfort during stressful situations. A treadmill stress echocardiogram was ordered and performed on 8/20/2019 which showed a preserved LVEF and no ischemia however with peak exercise she developed PVCs, salvos of monomorphic VT and 1 34-beat run of monomorphic VT all of the same etiology of the PVCs (LBBB, V3 transition, inferiorly directed). She was asymptomatic from this event during her stress test. She denies any history of near syncope/syncope. She denies palpitations or exertional chest discomfort/palpitations/light-headedness.     Patient was evaluated by MD Noel Hennessy > given  idiopathic VT, started on verapamil, Holter revealed Normal heart rate behavior  Very rare ectopy.  Hence recommended for ILR monitoring for long term arrhythmia monitoring        Patient presented to short stay cardiac unit on 11/22/19   for planned ILR MDT implant ,Patient denies any  noted bleeding,  overt shortness of breath, chest pains, rash , fevers, chills, or any other acute symptoms.  NO Hx TIA/CVA   My interpretation ECG is NSR

## 2019-11-22 NOTE — H&P
[facility] - Short Stay Cardiac Unit  Cardiac Electrophysiology  History and Physical     Admission Date: 11/22/2019  Code Status: No Order   Attending Provider: Gerhard Cohen MD   Principal Problem:Ventricular tachycardia    Subjective:     Chief Complaint:  VT     HPI:  69 year-old woman with PMH  obesity, hypertension and type 2 diabetes mellitus. She was first assessed by Dr. Turk in 2017 when she presented for evaluation of atypical chest pain. A stress echocardiogram was performed noting a normal LVEF, no arrhythmias during exercise, no ischemia, and a hypertensive response at peak stress (SBP 217mmHg). She was seen by Dr. Byrnes 7/2019 and reported burning type chest discomfort during stressful situations. A treadmill stress echocardiogram was ordered and performed on 8/20/2019 which showed a preserved LVEF and no ischemia however with peak exercise she developed PVCs, salvos of monomorphic VT and 1 34-beat run of monomorphic VT all of the same etiology of the PVCs (LBBB, V3 transition, inferiorly directed). She was asymptomatic from this event during her stress test. She denies any history of near syncope/syncope. She denies palpitations or exertional chest discomfort/palpitations/light-headedness.     Patient was evaluated by MD Noel Hennessy > given  idiopathic VT, started on verapamil, Holter revealed Normal heart rate behavior  Very rare ectopy.  Hence recommended for ILR monitoring for long term arrhythmia monitoring        Patient presented to short stay cardiac unit on 11/22/19   for planned ILR MDT implant ,Patient denies any  noted bleeding,  overt shortness of breath, chest pains, rash , fevers, chills, or any other acute symptoms.  NO Hx TIA/CVA   My interpretation ECG is NSR         Past Medical History:   Diagnosis Date    Diabetes mellitus type II, controlled     Hypertension     Obese     Ocular hypertension     Overactive bladder     Thyroid cyst        Past Surgical  History:   Procedure Laterality Date    none         Review of patient's allergies indicates:   Allergen Reactions    Citric acid Hives       No current facility-administered medications on file prior to encounter.      Current Outpatient Medications on File Prior to Encounter   Medication Sig    aspirin 81 MG Chew Take 81 mg by mouth once daily.    b complex vitamins capsule Take 1 capsule by mouth once daily.    calcium-vitamin D3 500 mg(1,250mg) -200 unit per tablet Take 1 tablet by mouth once daily.    cinnamon bark 500 mg capsule Take 500 mg by mouth once daily.    dorzolamide-timolol 2-0.5% (COSOPT) 22.3-6.8 mg/mL ophthalmic solution Place 1 drop into both eyes 2 (two) times daily.     FLAXSEED ORAL Take 1 tablet by mouth once daily at 6am.     hydroCHLOROthiazide (HYDRODIURIL) 25 MG tablet Take 1 tablet (25 mg total) by mouth once daily.    ibuprofen (ADVIL,MOTRIN) 800 MG tablet Take 1 tablet (800 mg total) by mouth every 8 (eight) hours as needed.    latanoprost 0.005 % ophthalmic solution Place 1 drop into both eyes once daily.     multivitamin capsule Take 1 capsule by mouth once daily.    trospium (SANCTURA XR) 60 mg Cp24 capsule 60 mg once daily.     verapamil (VERELAN) 180 MG C24P Take 1 capsule (180 mg total) by mouth once daily.    CRANBERRY FRUIT CONCENTRATE (AZO CRANBERRY ORAL) Take by mouth once daily at 6am.     FLUARIX QUAD 3170-5323, PF, 60 mcg (15 mcg x 4)/0.5 mL Syrg vaccine ADM 0.5ML IM UTD     Family History     Problem Relation (Age of Onset)    Coronary artery disease Sister, Brother    Dementia Sister    Diabetes Father, Sister    Glaucoma Mother    Heart attack Sister, Brother    Hypertension Mother    Stroke Mother, Brother        Tobacco Use    Smoking status: Never Smoker    Smokeless tobacco: Never Used   Substance and Sexual Activity    Alcohol use: Yes     Alcohol/week: 2.0 - 3.0 standard drinks     Types: 2 - 3 Standard drinks or equivalent per week    Drug  use: No    Sexual activity: Yes     Partners: Male     ROS   Constitution: Negative for fevers/chills.   Negative for weakness  HENT: Negative for ear pain and tinnitus.   Eyes: Negative for blurred vision.   Cardiovascular: . Negative for chest pain,  near-syncope, and syncope.   Respiratory:  Negative  for shortness of breath   Endocrine:  Negative for polyuria.   Hematologic/Lymphatic: Negative for bruise/bleed easily.   Skin:  Negative for rash.   Musculoskeletal: Negative for joint pain and muscle weakness.   Extremity: Negative for swelling in the lower extremities.   Gastrointestinal:  Negative for abdominal pain and change in bowel habit.   Genitourinary: Negative for frequency.   Neurological:  Negative for dizziness.   Psychiatric/Behavioral:  Negative for depression, anxiety       Objective:     Vital Signs (Most Recent):  Temp: 96.8 °F (36 °C) (11/22/19 0951)  Pulse: 66 (11/22/19 0951)  Resp: 18 (11/22/19 0951)  BP: 133/71 (11/22/19 0953)  SpO2: 96 % (11/22/19 0951) Vital Signs (24h Range):  Temp:  [96.8 °F (36 °C)] 96.8 °F (36 °C)  Pulse:  [66] 66  Resp:  [18] 18  SpO2:  [96 %] 96 %  BP: (130-133)/(70-71) 133/71       Weight: 85.4 kg (188 lb 3.2 oz)  Body mass index is 32.3 kg/m².    SpO2: 96 %  O2 Device (Oxygen Therapy): room air    Physical Exam  General: Well developed, well nourished, No distress on room air   HEENT: Head is normocephalic, atraumatic;  Lungs: Clear to auscultation bilaterally.  No wheezing. Normal respiratory effort.  Cardiovascular:  S1 S2 Normal rate, regular rhythm and normal heart sounds.    PMI is not displaced  Extremities: No LE edema. Pulses 2+ and symmetric.   Abdomen: Abdomen is soft, non-tender non-distended with normal bowel sounds.  Skin: Skin color, texture, turgor normal. No rashes.  Musculoskeletal: normal range of motion   Neurologic: Normal strength and tone. No focal numbness or weakness.   Psychiatric: normal mood and affect.  behavior is normal. Alert and  oriented X 3.  Labs reviewed     Significant Imaging: chart reviewed     Assessment and Plan:     * Ventricular tachycardia  ILR implantation     prior to procedure, extensive discussion with patient regarding risks and benefits of  ILR implantation , and patient  would like to proceed.  The patient/ spouse  voices understanding and all questions have been answered. No further questions/concerns voiced at this time.                Jordan oJhnston NP  Cardiac Electrophysiology  [facility] - Short Stay Cardiac Unit  STAFF MD Gerhard Cohen

## 2019-11-22 NOTE — NURSING
Discharge instructions and medlist given.  Patient verbalized understanding.  Denies need for escort or wheelchair off unit.  Off unit walking for discharge

## 2019-11-22 NOTE — DISCHARGE SUMMARY
[facility] - Short Stay Cardiac Unit  Cardiac Electrophysiology  Discharge Summary      Patient Name: Jennifer Motley  MRN: [id number]  Admission Date: 11/22/2019  Hospital Length of Stay: 0 days  Discharge Date and Time:  11/22/2019   Attending Physician: MD Gerhard Cohen   Discharging Provider: Jordan Johnston NP  Primary Care Physician: Jaqueline Byrnes MD       HPI:  69 year-old woman with PMH  obesity, hypertension and type 2 diabetes mellitus. She was first assessed by Dr. Turk in 2017 when she presented for evaluation of atypical chest pain. A stress echocardiogram was performed noting a normal LVEF, no arrhythmias during exercise, no ischemia, and a hypertensive response at peak stress (SBP 217mmHg). She was seen by Dr. Byrnes 7/2019 and reported burning type chest discomfort during stressful situations. A treadmill stress echocardiogram was ordered and performed on 8/20/2019 which showed a preserved LVEF and no ischemia however with peak exercise she developed PVCs, salvos of monomorphic VT and 1 34-beat run of monomorphic VT all of the same etiology of the PVCs (LBBB, V3 transition, inferiorly directed). She was asymptomatic from this event during her stress test. She denies any history of near syncope/syncope. She denies palpitations or exertional chest discomfort/palpitations/light-headedness.      Patient was evaluated by MD Noel Hennessy > given  idiopathic VT, started on verapamil, Holter revealed Normal heart rate behavior  Very rare ectopy.  Hence recommended for ILR monitoring for long term arrhythmia monitoring       Patient presented to short stay cardiac unit on 11/22/19   for planned ILR MDT implant ,Patient denies any  noted bleeding,  overt shortness of breath, chest pains, rash , fevers, chills, or any other acute symptoms.  NO Hx TIA/CVA   My interpretation ECG is NSR     Procedure(s) (LRB):  INSERTION, IMPLANTABLE LOOP RECORDER (N/A)       Hospital Course:  Pt underwent ILR  implant ( see procedure note for details) , tolerated procedure, no acute complications noted. Left chest site with aquacel foam dressing CDI . Patient denies any complains.   Pt to follow up with device clinic in 1 week for wound check , and  3 months with MD Gerhard Cohen   Discharge plans/instructions discussed with patient   who verbalized understanding and agreement of plans of care.  No further questions or concern voiced at this time.        Pending Diagnostic Studies:     Procedure Component Value Units Date/Time    EKG 12-lead [[id number]] Collected:  11/22/19 0958    Order Status:  Sent Lab Status:  In process Updated:  11/22/19 1048    Narrative:       Test Reason : I47.2,I47.2,    Vent. Rate : 062 BPM     Atrial Rate : 062 BPM     P-R Int : 168 ms          QRS Dur : 076 ms      QT Int : 398 ms       P-R-T Axes : 068 -27 021 degrees     QTc Int : 403 ms    Normal sinus rhythm  Possible Left atrial enlargement  LVH  Cannot rule out Septal infarct ,age undetermined  Abnormal ECG  When compared with ECG of 11-NOV-2019 14:40,  No significant change was found    Referred By: YINKA MCINTOSH           Confirmed By:           Final Active Diagnoses:    Diagnosis Date Noted POA    PRINCIPAL PROBLEM:  Ventricular tachycardia [I47.2] 11/22/2019 Yes      Problems Resolved During this Admission:       Discharged Condition: good    Disposition: Home or Self Care    Follow Up:  Follow-up Information     [facility] [place].    Specialty:  Arrhythmia  Why:  For wound re-check  Contact information:  [address] [facility] [id number] [phone]           Gerhard Cohen MD In 3 months.    Specialties:  Electrophysiology, Cardiology  Contact information:  [address] [facility] [id number] [phone]                 Patient Instructions:      Diet Cardiac     No driving until:   Order Comments: For 24 hours post procedure     Notify your health care provider if you experience any of the  following:  temperature >100.4     Notify your health care provider if you experience any of the following:  persistent nausea and vomiting or diarrhea     Notify your health care provider if you experience any of the following:  severe uncontrolled pain     Notify your health care provider if you experience any of the following:  redness, tenderness, or signs of infection (pain, swelling, redness, odor or green/yellow discharge around incision site)     Notify your health care provider if you experience any of the following:  difficulty breathing or increased cough     Notify your health care provider if you experience any of the following:  severe persistent headache     Notify your health care provider if you experience any of the following:  worsening rash     Notify your health care provider if you experience any of the following:  persistent dizziness, light-headedness, or visual disturbances     Notify your health care provider if you experience any of the following:  increased confusion or weakness     Notify your health care provider if you experience any of the following:   Order Comments: For any concerning medical symptoms     Remove dressing in 48 hours     Medications:  Reconciled Home Medications:      Medication List      CONTINUE taking these medications    aspirin 81 MG Chew  Take 81 mg by mouth once daily.     AZO CRANBERRY ORAL  Take by mouth once daily at 6am.     b complex vitamins capsule  Take 1 capsule by mouth once daily.     calcium-vitamin D3 500 mg(1,250mg) -200 unit per tablet  Commonly known as:  OS-ARTURO 500 + D3  Take 1 tablet by mouth once daily.     cinnamon bark 500 mg capsule  Take 500 mg by mouth once daily.     dorzolamide-timolol 2-0.5% 22.3-6.8 mg/mL ophthalmic solution  Commonly known as:  COSOPT  Place 1 drop into both eyes 2 (two) times daily.     FLAXSEED ORAL  Take 1 tablet by mouth once daily at 6am.     Fluarix Quad 1642-6734 (PF) 60 mcg (15 mcg x 4)/0.5 mL Syrg  vaccine  Generic drug:  influenza  ADM 0.5ML IM UTD     hydroCHLOROthiazide 25 MG tablet  Commonly known as:  HYDRODIURIL  Take 1 tablet (25 mg total) by mouth once daily.     ibuprofen 800 MG tablet  Commonly known as:  ADVIL,MOTRIN  Take 1 tablet (800 mg total) by mouth every 8 (eight) hours as needed.     latanoprost 0.005 % ophthalmic solution  Place 1 drop into both eyes once daily.     multivitamin capsule  Take 1 capsule by mouth once daily.     trospium 60 mg Cp24 capsule  Commonly known as:  SANCTURA XR  60 mg once daily.     verapamil 180 MG C24p  Commonly known as:  VERELAN  Take 1 capsule (180 mg total) by mouth once daily.            Time spent on the discharge of patient: 15  minutes    Jordan Johnston NP  Cardiac Electrophysiology  [facility] Short Stay Cardiac Unit  STAFF MD Gerhard Cohen

## 2019-11-27 DIAGNOSIS — I47.29 MONOMORPHIC VENTRICULAR TACHYCARDIA: ICD-10-CM

## 2019-11-27 DIAGNOSIS — I47.29 IDIOPATHIC VENTRICULAR TACHYCARDIA: Primary | ICD-10-CM

## 2019-11-27 DIAGNOSIS — R00.0 WIDE-COMPLEX TACHYCARDIA: ICD-10-CM

## 2019-12-02 ENCOUNTER — CLINICAL SUPPORT (OUTPATIENT)
Dept: CARDIOLOGY | Facility: HOSPITAL | Age: 69
End: 2019-12-02
Attending: INTERNAL MEDICINE
Payer: COMMERCIAL

## 2019-12-02 DIAGNOSIS — I47.29 IDIOPATHIC VENTRICULAR TACHYCARDIA: ICD-10-CM

## 2019-12-22 ENCOUNTER — CLINICAL SUPPORT (OUTPATIENT)
Dept: CARDIOLOGY | Facility: HOSPITAL | Age: 69
End: 2019-12-22
Payer: COMMERCIAL

## 2019-12-22 PROCEDURE — 93299 CARDIAC DEVICE CHECK - REMOTE: CPT | Performed by: INTERNAL MEDICINE

## 2019-12-22 PROCEDURE — 93298 CARDIAC DEVICE CHECK - REMOTE: ICD-10-PCS | Mod: ,,, | Performed by: INTERNAL MEDICINE

## 2019-12-22 PROCEDURE — 93298 REM INTERROG DEV EVAL SCRMS: CPT | Mod: ,,, | Performed by: INTERNAL MEDICINE

## 2019-12-26 ENCOUNTER — PATIENT MESSAGE (OUTPATIENT)
Dept: INTERNAL MEDICINE | Facility: CLINIC | Age: 69
End: 2019-12-26

## 2019-12-26 DIAGNOSIS — Z12.11 SCREEN FOR COLON CANCER: Primary | ICD-10-CM

## 2020-01-03 ENCOUNTER — PATIENT MESSAGE (OUTPATIENT)
Dept: INTERNAL MEDICINE | Facility: CLINIC | Age: 70
End: 2020-01-03

## 2020-01-03 ENCOUNTER — PATIENT MESSAGE (OUTPATIENT)
Dept: ELECTROPHYSIOLOGY | Facility: CLINIC | Age: 70
End: 2020-01-03

## 2020-01-07 ENCOUNTER — LAB VISIT (OUTPATIENT)
Dept: LAB | Facility: HOSPITAL | Age: 70
End: 2020-01-07
Attending: FAMILY MEDICINE
Payer: COMMERCIAL

## 2020-01-07 DIAGNOSIS — Z12.11 SCREEN FOR COLON CANCER: ICD-10-CM

## 2020-01-07 PROCEDURE — 82274 ASSAY TEST FOR BLOOD FECAL: CPT

## 2020-01-14 LAB — HEMOCCULT STL QL IA: NEGATIVE

## 2020-01-21 ENCOUNTER — CLINICAL SUPPORT (OUTPATIENT)
Dept: CARDIOLOGY | Facility: HOSPITAL | Age: 70
End: 2020-01-21
Payer: COMMERCIAL

## 2020-01-21 PROCEDURE — 93298 REM INTERROG DEV EVAL SCRMS: CPT | Mod: ,,, | Performed by: INTERNAL MEDICINE

## 2020-01-21 PROCEDURE — G2066 INTER DEVC REMOTE 30D: HCPCS | Performed by: INTERNAL MEDICINE

## 2020-01-21 PROCEDURE — 93298 CARDIAC DEVICE CHECK - REMOTE: ICD-10-PCS | Mod: ,,, | Performed by: INTERNAL MEDICINE

## 2020-01-25 ENCOUNTER — OFFICE VISIT (OUTPATIENT)
Dept: URGENT CARE | Facility: CLINIC | Age: 70
End: 2020-01-25
Payer: COMMERCIAL

## 2020-01-25 VITALS
RESPIRATION RATE: 16 BRPM | DIASTOLIC BLOOD PRESSURE: 62 MMHG | BODY MASS INDEX: 33.29 KG/M2 | OXYGEN SATURATION: 95 % | SYSTOLIC BLOOD PRESSURE: 136 MMHG | WEIGHT: 195 LBS | HEIGHT: 64 IN | TEMPERATURE: 98 F | HEART RATE: 62 BPM

## 2020-01-25 DIAGNOSIS — R30.0 DYSURIA: ICD-10-CM

## 2020-01-25 DIAGNOSIS — N30.90 CYSTITIS: Primary | ICD-10-CM

## 2020-01-25 LAB
BILIRUB UR QL STRIP: POSITIVE
GLUCOSE UR QL STRIP: POSITIVE
KETONES UR QL STRIP: NEGATIVE
LEUKOCYTE ESTERASE UR QL STRIP: POSITIVE
PH, POC UA: 7 (ref 5–8)
POC BLOOD, URINE: POSITIVE
POC NITRATES, URINE: POSITIVE
PROT UR QL STRIP: POSITIVE
SP GR UR STRIP: 1.01 (ref 1–1.03)
UROBILINOGEN UR STRIP-ACNC: POSITIVE (ref 0.1–1.1)

## 2020-01-25 PROCEDURE — 87186 SC STD MICRODIL/AGAR DIL: CPT

## 2020-01-25 PROCEDURE — 87077 CULTURE AEROBIC IDENTIFY: CPT

## 2020-01-25 PROCEDURE — 81003 URINALYSIS AUTO W/O SCOPE: CPT | Mod: QW,S$GLB,, | Performed by: PHYSICIAN ASSISTANT

## 2020-01-25 PROCEDURE — 87086 URINE CULTURE/COLONY COUNT: CPT

## 2020-01-25 PROCEDURE — 99214 PR OFFICE/OUTPT VISIT, EST, LEVL IV, 30-39 MIN: ICD-10-PCS | Mod: 25,S$GLB,, | Performed by: PHYSICIAN ASSISTANT

## 2020-01-25 PROCEDURE — 87088 URINE BACTERIA CULTURE: CPT

## 2020-01-25 PROCEDURE — 81003 POCT URINALYSIS, DIPSTICK, AUTOMATED, W/O SCOPE: ICD-10-PCS | Mod: QW,S$GLB,, | Performed by: PHYSICIAN ASSISTANT

## 2020-01-25 PROCEDURE — 99214 OFFICE O/P EST MOD 30 MIN: CPT | Mod: 25,S$GLB,, | Performed by: PHYSICIAN ASSISTANT

## 2020-01-25 RX ORDER — CEPHALEXIN 500 MG/1
500 CAPSULE ORAL 4 TIMES DAILY
Qty: 28 CAPSULE | Refills: 0 | Status: SHIPPED | OUTPATIENT
Start: 2020-01-25 | End: 2020-02-01

## 2020-01-25 NOTE — PATIENT INSTRUCTIONS
"Fluids  Rest  Antibiotics  Follow up with primary care in 1 week      Please return here or go to the Emergency Department for any concerns or worsening of condition.  If you were prescribed antibiotics, please take them to completion.  If you were prescribed a narcotic medication, do not drive or operate heavy equipment or machinery while taking these medications.  Please follow up with your primary care doctor or specialist as needed.  Please drink plenty of fluids.  Please get plenty of rest.  If you were prescribed Pyridium (phenazopyridine), please be aware that if you wear contact lens that this medication may stain your contacts.  While taking this medication it is recommended that you do not wear your contacts until 24 hours after your last dose.  Please follow up with your primary care doctor or specialist as needed.    If you  smoke, please stop smoking.                Bladder Infection, Female (Adult)    Urine is normally doesn't have any bacteria in it. But bacteria can get into the urinary tract from the skin around the rectum. Or they can travel in the blood from elsewhere in the body. Once they are in your urinary tract, they can cause infection in the urethra (urethritis), the bladder (cystitis), or the kidneys (pyelonephritis).  The most common place for an infection is in the bladder. This is called a bladder infection. This is one of the most common infections in women. Most bladder infections are easily treated. They are not serious unless the infection spreads to the kidney.  The phrases "bladder infection," "UTI," and "cystitis" are often used to describe the same thing. But they are not always the same. Cystitis is an inflammation of the bladder. The most common cause of cystitis is an infection.  Symptoms  The infection causes inflammation in the urethra and bladder. This causes many of the symptoms. The most common symptoms of a bladder infection are:  · Pain or burning when " urinating  · Having to urinate more often than usual  · Urgent need to urinate  · Only a small amount of urine comes out  · Blood in urine  · Abdominal discomfort. This is usually in the lower abdomen above the pubic bone.  · Cloudy urine  · Strong- or bad-smelling urine  · Unable to urinate (urinary retention)  · Unable to hold urine in (urinary incontinence)  · Fever  · Loss of appetite  · Confusion (in older adults)  Causes  Bladder infections are not contagious. You can't get one from someone else, from a toilet seat, or from sharing a bath.  The most common cause of bladder infections is bacteria from the bowels. The bacteria get onto the skin around the opening of the urethra. From there, they can get into the urine and travel up to the bladder, causing inflammation and infection. This usually happens because of:  · Wiping improperly after urinating. Always wipe from front to back.  · Bowel incontinence  · Pregnancy  · Procedures such as having a catheter inserted  · Older age  · Not emptying your bladder. This can allow bacteria a chance to grow in your urine.  · Dehydration  · Constipation  · Sex  · Use of a diaphragm for birth control   Treatment  Bladder infections are diagnosed by a urine test. They are treated with antibiotics and usually clear up quickly without complications. Treatment helps prevent a more serious kidney infection.  Medicines  Medicines can help in the treatment of a bladder infection:  · Take antibiotics until they are used up, even if you feel better. It is important to finish them to make sure the infection has cleared.  · You can use acetaminophen or ibuprofen for pain, fever, or discomfort, unless another medicine was prescribed. If you have chronic liver or kidney disease, talk with your healthcare provider before using these medicines. Also talk with your provider if you've ever had a stomach ulcer or gastrointestinal bleeding, or are taking blood-thinner medicines.  · If you  are given phenazopydridine to reduce burning with urination, it will cause your urine to become a bright orange color. This can stain clothing.  Care and prevention  These self-care steps can help prevent future infections:  · Drink plenty of fluids to prevent dehydration and flush out your bladder. Do this unless you must restrict fluids for other health reasons, or your doctor told you not to.  · Proper cleaning after going to the bathroom is important. Wipe from front to back after using the toilet to prevent the spread of bacteria.  · Urinate more often. Don't try to hold urine in for a long time.  · Wear loose-fitting clothes and cotton underwear. Avoid tight-fitting pants.  · Improve your diet and prevent constipation. Eat more fresh fruit and vegetables, and fiber, and less junk and fatty foods.  · Avoid sex until your symptoms are gone.  · Avoid caffeine, alcohol, and spicy foods. These can irritate your bladder.  · Urinate right after intercourse to flush out your bladder.  · If you use birth control pills and have frequent bladder infections, discuss it with your doctor.  Follow-up care  Call your healthcare provider if all symptoms are not gone after 3 days of treatment. This is especially important if you have repeat infections.  If a culture was done, you will be told if your treatment needs to be changed. If directed, you can call to find out the results.  If X-rays were done, you will be told if the results will affect your treatment.  Call 911  Call 911 if any of the following occur:  · Trouble breathing  · Hard to wake up or confusion  · Fainting or loss of consciousness  · Rapid heart rate  When to seek medical advice  Call your healthcare provider right away if any of these occur:  · Fever of 100.4ºF (38.0ºC) or higher, or as directed by your healthcare provider  · Symptoms are not better by the third day of treatment  · Back or belly (abdominal) pain that gets worse  · Repeated vomiting, or  unable to keep medicine down  · Weakness or dizziness  · Vaginal discharge  · Pain, redness, or swelling in the outer vaginal area (labia)  Date Last Reviewed: 10/1/2016  © 7383-9459 [employer] [address] [place] [address], [place] [address]. All rights reserved. This information is not intended as a substitute for professional medical care. Always follow your healthcare professional's instructions.

## 2020-01-25 NOTE — PROGRESS NOTES
"Subjective:       Patient ID: Jennifer Motley is a 69 y.o. female.    Vitals:  height is 5' 4" (1.626 m) and weight is 88.5 kg (195 lb). Her temperature is 97.6 °F (36.4 °C). Her blood pressure is 136/62 and her pulse is 62. Her respiration is 16 and oxygen saturation is 95%.     Chief Complaint: Dysuria    69-year-old female presents complaining of 1 week of dysuria, frequency, urgency, suprapubic pain that is mild.  Reports symptoms are worsening.  She denies any nausea, vomiting, chills, sweats, body aches.  She feels very well otherwise.  No history of kidney stones.  She denies any no difficulty urinating.    Dysuria    The current episode started in the past 7 days (Monday ). The problem occurs every urination. The quality of the pain is described as burning. The patient is experiencing no pain. Associated symptoms include frequency and urgency. Pertinent negatives include no chills, hematuria, nausea, vomiting or rash. She has tried increased fluids (uricalm max ) for the symptoms.       Constitution: Negative for chills and fever.   Neck: Negative for painful lymph nodes.   Gastrointestinal: Negative for abdominal pain, nausea and vomiting.   Genitourinary: Positive for dysuria, frequency, urgency and pelvic pain. Negative for urine decreased, hematuria, history of kidney stones, painful menstruation, irregular menstruation, missed menses, heavy menstrual bleeding, ovarian cysts, genital trauma, vaginal pain, vaginal discharge, vaginal bleeding, vaginal odor, painful intercourse, genital sore and painful ejaculation.   Musculoskeletal: Negative for back pain.   Skin: Negative for rash and lesion.   Hematologic/Lymphatic: Negative for swollen lymph nodes.       Objective:      Physical Exam   Constitutional: She is oriented to person, place, and time. She appears well-developed and well-nourished.   HENT:   Head: Normocephalic and atraumatic.   Right Ear: External ear normal.   Left Ear: External ear normal. "   Nose: Nose normal. No nasal deformity. No epistaxis.   Mouth/Throat: Oropharynx is clear and moist and mucous membranes are normal.   Eyes: Lids are normal.   Neck: Trachea normal, normal range of motion and phonation normal. Neck supple.   Cardiovascular: Normal pulses.   Pulmonary/Chest: Effort normal.   Abdominal: Soft. Normal appearance and bowel sounds are normal. She exhibits no distension. There is tenderness (mild) in the suprapubic area. There is no CVA tenderness.   No CVA TTP   Neurological: She is alert and oriented to person, place, and time.   Skin: Skin is warm, dry and intact.   Psychiatric: She has a normal mood and affect. Her speech is normal and behavior is normal. Cognition and memory are normal.   Nursing note and vitals reviewed.    Results for orders placed or performed in visit on 01/25/20   POCT Urinalysis, Dipstick, Automated, W/O Scope   Result Value Ref Range    POC Blood, Urine Positive (A) Negative    POC Bilirubin, Urine Positive (A) Negative    POC Urobilinogen, Urine Positive 0.1 - 1.1    POC Ketones, Urine Negative Negative    POC Protein, Urine Positive (A) Negative    POC Nitrates, Urine Positive (A) Negative    POC Glucose, Urine Positive (A) Negative    pH, UA 7.0 5 - 8    POC Specific Gravity, Urine 1.015 1.003 - 1.029    POC Leukocytes, Urine Positive (A) Negative           Assessment:       1. Cystitis    2. Dysuria        Plan:       UTI symptoms for 7 days, worsening.  UA with 250 blood.  However, no signs or symptoms of pyelo.  Will treat with Keflex.  Patient with history of V-tach, will avoid Cipro.  Discussed with patient signs to look out for of kidney infection or stone.  If she experiences any worsening symptoms, new, changing symptoms, nausea, vomiting, worsened pain, she should be seen immediately either here, primary care or in the emergency department.  F/u pcp 1 week    Labs reviewed, pertinent imaging reviewed, previous medical records, medical history,  "surgical history, social history, family history reviewed.      Cystitis  -     cephALEXin (KEFLEX) 500 MG capsule; Take 1 capsule (500 mg total) by mouth 4 (four) times daily. for 7 days  Dispense: 28 capsule; Refill: 0    Dysuria  -     POCT Urinalysis, Dipstick, Automated, W/O Scope  -     Urine culture         Patient Instructions   Fluids  Rest  Antibiotics  Follow up with primary care in 1 week      Please return here or go to the Emergency Department for any concerns or worsening of condition.  If you were prescribed antibiotics, please take them to completion.  If you were prescribed a narcotic medication, do not drive or operate heavy equipment or machinery while taking these medications.  Please follow up with your primary care doctor or specialist as needed.  Please drink plenty of fluids.  Please get plenty of rest.  If you were prescribed Pyridium (phenazopyridine), please be aware that if you wear contact lens that this medication may stain your contacts.  While taking this medication it is recommended that you do not wear your contacts until 24 hours after your last dose.  Please follow up with your primary care doctor or specialist as needed.    If you  smoke, please stop smoking.                Bladder Infection, Female (Adult)    Urine is normally doesn't have any bacteria in it. But bacteria can get into the urinary tract from the skin around the rectum. Or they can travel in the blood from elsewhere in the body. Once they are in your urinary tract, they can cause infection in the urethra (urethritis), the bladder (cystitis), or the kidneys (pyelonephritis).  The most common place for an infection is in the bladder. This is called a bladder infection. This is one of the most common infections in women. Most bladder infections are easily treated. They are not serious unless the infection spreads to the kidney.  The phrases "bladder infection," "UTI," and "cystitis" are often used to describe the " same thing. But they are not always the same. Cystitis is an inflammation of the bladder. The most common cause of cystitis is an infection.  Symptoms  The infection causes inflammation in the urethra and bladder. This causes many of the symptoms. The most common symptoms of a bladder infection are:  · Pain or burning when urinating  · Having to urinate more often than usual  · Urgent need to urinate  · Only a small amount of urine comes out  · Blood in urine  · Abdominal discomfort. This is usually in the lower abdomen above the pubic bone.  · Cloudy urine  · Strong- or bad-smelling urine  · Unable to urinate (urinary retention)  · Unable to hold urine in (urinary incontinence)  · Fever  · Loss of appetite  · Confusion (in older adults)  Causes  Bladder infections are not contagious. You can't get one from someone else, from a toilet seat, or from sharing a bath.  The most common cause of bladder infections is bacteria from the bowels. The bacteria get onto the skin around the opening of the urethra. From there, they can get into the urine and travel up to the bladder, causing inflammation and infection. This usually happens because of:  · Wiping improperly after urinating. Always wipe from front to back.  · Bowel incontinence  · Pregnancy  · Procedures such as having a catheter inserted  · Older age  · Not emptying your bladder. This can allow bacteria a chance to grow in your urine.  · Dehydration  · Constipation  · Sex  · Use of a diaphragm for birth control   Treatment  Bladder infections are diagnosed by a urine test. They are treated with antibiotics and usually clear up quickly without complications. Treatment helps prevent a more serious kidney infection.  Medicines  Medicines can help in the treatment of a bladder infection:  · Take antibiotics until they are used up, even if you feel better. It is important to finish them to make sure the infection has cleared.  · You can use acetaminophen or ibuprofen  for pain, fever, or discomfort, unless another medicine was prescribed. If you have chronic liver or kidney disease, talk with your healthcare provider before using these medicines. Also talk with your provider if you've ever had a stomach ulcer or gastrointestinal bleeding, or are taking blood-thinner medicines.  · If you are given phenazopydridine to reduce burning with urination, it will cause your urine to become a bright orange color. This can stain clothing.  Care and prevention  These self-care steps can help prevent future infections:  · Drink plenty of fluids to prevent dehydration and flush out your bladder. Do this unless you must restrict fluids for other health reasons, or your doctor told you not to.  · Proper cleaning after going to the bathroom is important. Wipe from front to back after using the toilet to prevent the spread of bacteria.  · Urinate more often. Don't try to hold urine in for a long time.  · Wear loose-fitting clothes and cotton underwear. Avoid tight-fitting pants.  · Improve your diet and prevent constipation. Eat more fresh fruit and vegetables, and fiber, and less junk and fatty foods.  · Avoid sex until your symptoms are gone.  · Avoid caffeine, alcohol, and spicy foods. These can irritate your bladder.  · Urinate right after intercourse to flush out your bladder.  · If you use birth control pills and have frequent bladder infections, discuss it with your doctor.  Follow-up care  Call your healthcare provider if all symptoms are not gone after 3 days of treatment. This is especially important if you have repeat infections.  If a culture was done, you will be told if your treatment needs to be changed. If directed, you can call to find out the results.  If X-rays were done, you will be told if the results will affect your treatment.  Call 911  Call 911 if any of the following occur:  · Trouble breathing  · Hard to wake up or confusion  · Fainting or loss of consciousness  · Rapid  heart rate  When to seek medical advice  Call your healthcare provider right away if any of these occur:  · Fever of 100.4ºF (38.0ºC) or higher, or as directed by your healthcare provider  · Symptoms are not better by the third day of treatment  · Back or belly (abdominal) pain that gets worse  · Repeated vomiting, or unable to keep medicine down  · Weakness or dizziness  · Vaginal discharge  · Pain, redness, or swelling in the outer vaginal area (labia)  Date Last Reviewed: 10/1/2016  © 8796-2701 [employer]. [address] [place] [address]. All rights reserved. This information is not intended as a substitute for professional medical care. Always follow your healthcare professional's instructions.

## 2020-01-27 LAB — BACTERIA UR CULT: ABNORMAL

## 2020-01-28 ENCOUNTER — TELEPHONE (OUTPATIENT)
Dept: URGENT CARE | Facility: CLINIC | Age: 70
End: 2020-01-28

## 2020-01-28 NOTE — TELEPHONE ENCOUNTER
Attempted to call patient regarding urine culture.  On Keflex.  Culture with intermediate resistance to cefazolin. Complete sensitivity to cefepime and ceftriaxone. Would like to know how patient is feeling. If not feeling better, will need different antibiotic    Left number on voicemail for callback

## 2020-01-28 NOTE — TELEPHONE ENCOUNTER
Patient call back.  States she is feeling much better.  Denies any dysuria, frequency, urgency, abdominal pain. No blood in urine.     Recommend take abx to completion and f/u with pcp to ensure full resolution.

## 2020-02-07 ENCOUNTER — PATIENT MESSAGE (OUTPATIENT)
Dept: INTERNAL MEDICINE | Facility: CLINIC | Age: 70
End: 2020-02-07

## 2020-02-13 ENCOUNTER — PATIENT MESSAGE (OUTPATIENT)
Dept: INTERNAL MEDICINE | Facility: CLINIC | Age: 70
End: 2020-02-13

## 2020-02-18 ENCOUNTER — LAB VISIT (OUTPATIENT)
Dept: LAB | Facility: HOSPITAL | Age: 70
End: 2020-02-18
Attending: FAMILY MEDICINE
Payer: COMMERCIAL

## 2020-02-18 DIAGNOSIS — E11.9 TYPE 2 DIABETES MELLITUS WITHOUT COMPLICATION: ICD-10-CM

## 2020-02-18 LAB
ESTIMATED AVG GLUCOSE: 128 MG/DL (ref 68–131)
HBA1C MFR BLD HPLC: 6.1 % (ref 4–5.6)

## 2020-02-18 PROCEDURE — 83036 HEMOGLOBIN GLYCOSYLATED A1C: CPT

## 2020-02-18 PROCEDURE — 36415 COLL VENOUS BLD VENIPUNCTURE: CPT

## 2020-02-20 ENCOUNTER — OFFICE VISIT (OUTPATIENT)
Dept: INTERNAL MEDICINE | Facility: CLINIC | Age: 70
End: 2020-02-20
Attending: FAMILY MEDICINE
Payer: COMMERCIAL

## 2020-02-20 VITALS
WEIGHT: 190.69 LBS | HEART RATE: 86 BPM | HEIGHT: 64 IN | BODY MASS INDEX: 32.56 KG/M2 | OXYGEN SATURATION: 98 % | SYSTOLIC BLOOD PRESSURE: 138 MMHG | DIASTOLIC BLOOD PRESSURE: 70 MMHG

## 2020-02-20 DIAGNOSIS — N39.0 URINARY TRACT INFECTION WITH HEMATURIA, SITE UNSPECIFIED: ICD-10-CM

## 2020-02-20 DIAGNOSIS — I10 ESSENTIAL HYPERTENSION: Primary | ICD-10-CM

## 2020-02-20 DIAGNOSIS — E11.9 CONTROLLED TYPE 2 DIABETES MELLITUS WITHOUT COMPLICATION, WITHOUT LONG-TERM CURRENT USE OF INSULIN: ICD-10-CM

## 2020-02-20 DIAGNOSIS — R31.9 URINARY TRACT INFECTION WITH HEMATURIA, SITE UNSPECIFIED: ICD-10-CM

## 2020-02-20 PROCEDURE — 99999 PR PBB SHADOW E&M-EST. PATIENT-LVL III: CPT | Mod: PBBFAC,,, | Performed by: FAMILY MEDICINE

## 2020-02-20 PROCEDURE — 1126F PR PAIN SEVERITY QUANTIFIED, NO PAIN PRESENT: ICD-10-PCS | Mod: S$GLB,,, | Performed by: FAMILY MEDICINE

## 2020-02-20 PROCEDURE — 3044F HG A1C LEVEL LT 7.0%: CPT | Mod: CPTII,S$GLB,, | Performed by: FAMILY MEDICINE

## 2020-02-20 PROCEDURE — 99999 PR PBB SHADOW E&M-EST. PATIENT-LVL III: ICD-10-PCS | Mod: PBBFAC,,, | Performed by: FAMILY MEDICINE

## 2020-02-20 PROCEDURE — 3078F DIAST BP <80 MM HG: CPT | Mod: CPTII,S$GLB,, | Performed by: FAMILY MEDICINE

## 2020-02-20 PROCEDURE — 1159F PR MEDICATION LIST DOCUMENTED IN MEDICAL RECORD: ICD-10-PCS | Mod: S$GLB,,, | Performed by: FAMILY MEDICINE

## 2020-02-20 PROCEDURE — 1101F PT FALLS ASSESS-DOCD LE1/YR: CPT | Mod: CPTII,S$GLB,, | Performed by: FAMILY MEDICINE

## 2020-02-20 PROCEDURE — 1126F AMNT PAIN NOTED NONE PRSNT: CPT | Mod: S$GLB,,, | Performed by: FAMILY MEDICINE

## 2020-02-20 PROCEDURE — 3075F SYST BP GE 130 - 139MM HG: CPT | Mod: CPTII,S$GLB,, | Performed by: FAMILY MEDICINE

## 2020-02-20 PROCEDURE — 99213 OFFICE O/P EST LOW 20 MIN: CPT | Mod: S$GLB,,, | Performed by: FAMILY MEDICINE

## 2020-02-20 PROCEDURE — 3075F PR MOST RECENT SYSTOLIC BLOOD PRESS GE 130-139MM HG: ICD-10-PCS | Mod: CPTII,S$GLB,, | Performed by: FAMILY MEDICINE

## 2020-02-20 PROCEDURE — 1159F MED LIST DOCD IN RCRD: CPT | Mod: S$GLB,,, | Performed by: FAMILY MEDICINE

## 2020-02-20 PROCEDURE — 1101F PR PT FALLS ASSESS DOC 0-1 FALLS W/OUT INJ PAST YR: ICD-10-PCS | Mod: CPTII,S$GLB,, | Performed by: FAMILY MEDICINE

## 2020-02-20 PROCEDURE — 99213 PR OFFICE/OUTPT VISIT, EST, LEVL III, 20-29 MIN: ICD-10-PCS | Mod: S$GLB,,, | Performed by: FAMILY MEDICINE

## 2020-02-20 PROCEDURE — 3044F PR MOST RECENT HEMOGLOBIN A1C LEVEL <7.0%: ICD-10-PCS | Mod: CPTII,S$GLB,, | Performed by: FAMILY MEDICINE

## 2020-02-20 PROCEDURE — 3078F PR MOST RECENT DIASTOLIC BLOOD PRESSURE < 80 MM HG: ICD-10-PCS | Mod: CPTII,S$GLB,, | Performed by: FAMILY MEDICINE

## 2020-02-20 RX ORDER — METFORMIN HYDROCHLORIDE 500 MG/1
500 TABLET ORAL
Qty: 90 TABLET | Refills: 3 | Status: SHIPPED | OUTPATIENT
Start: 2020-02-20 | End: 2021-05-18 | Stop reason: SDUPTHER

## 2020-02-20 RX ORDER — HYDROCHLOROTHIAZIDE 25 MG/1
25 TABLET ORAL DAILY
Qty: 90 TABLET | Refills: 3
Start: 2020-02-20 | End: 2020-09-08

## 2020-02-20 NOTE — MEDICAL/APP STUDENT
Subjective:       Patient ID: Jennifer Motley is a 69 y.o. female.    Chief Complaint: Follow-up (f/u from urgent care ; UTI)    HPI   Ms. Motley, a 68 yo F, presents today for follow up of UTI. Diagnosed three weeks ago at [facility] after 1 week of burning with urination, dark urine with no improvement with OTC medications. No fevers at that time. Patient was given 7 day course of cephalexin which she has now completed. Symptoms have resolved. At present, no dysuria, no hematuria.    Diabetes Mellitus   Doing Weight Watchers and eating at home. Does not check blood glucose.Taking Metformin 500mg every other day for past month. Some intermittent constipation, resolves with eating salads and vegetables. No diarrhea, no current use of supplemental fiber. A1c down to 6.1% from 6.7% 6 months ago.    Ventricular Tachycardia  Had loop recorder implanted November, 2019. Scheduled for follow-up with cardiology next week.   No palpitations, no dizziness, no visual changes, no syncope, no headaches.    HTN  Taking HCTZ in AM, and verapamil qhs. Does not check BP at home.     Review of Systems   Constitutional: Negative for activity change, appetite change, chills, fatigue, fever and unexpected weight change.   HENT: Negative for congestion, facial swelling, hearing loss, nosebleeds, rhinorrhea, sinus pressure, sinus pain, sneezing, sore throat, tinnitus, trouble swallowing and voice change.    Eyes: Negative for photophobia, pain, discharge, redness and visual disturbance.   Respiratory: Negative for apnea, cough, choking, chest tightness, shortness of breath and wheezing.    Cardiovascular: Negative for chest pain, palpitations and leg swelling.   Gastrointestinal: Positive for constipation. Negative for abdominal distention, abdominal pain, blood in stool, diarrhea, nausea and vomiting.   Endocrine: Negative for polydipsia, polyphagia and polyuria.   Genitourinary: Negative for difficulty urinating, dysuria, flank  pain, frequency, hematuria, urgency, vaginal bleeding, vaginal discharge and vaginal pain.   Musculoskeletal: Negative for arthralgias, back pain, gait problem, joint swelling, myalgias, neck pain and neck stiffness.   Skin: Negative for rash and wound.   Neurological: Negative for dizziness, tremors, seizures, syncope, weakness, light-headedness, numbness and headaches.   Hematological: Does not bruise/bleed easily.   Psychiatric/Behavioral: Negative for confusion, decreased concentration, hallucinations, sleep disturbance and suicidal ideas. The patient is not nervous/anxious.        Objective:      Physical Exam   Constitutional: She is oriented to person, place, and time. She appears well-developed and well-nourished. No distress.   HENT:   Head: Normocephalic and atraumatic.   Right Ear: External ear normal.   Left Ear: External ear normal.   Nose: Nose normal.   Mouth/Throat: Oropharynx is clear and moist. No oropharyngeal exudate.   Eyes: Pupils are equal, round, and reactive to light. Conjunctivae and EOM are normal. Right eye exhibits no discharge. Left eye exhibits no discharge. No scleral icterus.   Neck: Normal range of motion. Neck supple. No JVD present. No tracheal deviation present. No thyromegaly present.   Cardiovascular: Normal rate, regular rhythm, normal heart sounds and intact distal pulses. Exam reveals no gallop and no friction rub.   No murmur heard.  Pulmonary/Chest: Effort normal and breath sounds normal. No stridor. No respiratory distress. She has no wheezes. She has no rales. She exhibits no tenderness.   Abdominal: Soft. Bowel sounds are normal. She exhibits no distension and no mass. There is no tenderness. There is no rebound and no guarding.   No flank tenderness   Musculoskeletal: Normal range of motion. She exhibits no edema, tenderness or deformity.   Lymphadenopathy:     She has no cervical adenopathy.   Neurological: She is alert and oriented to person, place, and time. No  cranial nerve deficit.   Skin: Skin is warm and dry. Capillary refill takes less than 2 seconds. No rash noted. She is not diaphoretic.   Psychiatric: She has a normal mood and affect. Her behavior is normal. Judgment and thought content normal.           Assessment & Plan:    Diabetes Mellitus, Type II, without complication  - Continue with dietary modification & lifestyle changes, including 150 minutes of aerobic exercise per week  - Start Metformin 500mg once daily    HTN  - Decrease daily sodium intake to less than 2,000mg  - Continue on HCTZ 25mg daily  - Increase Verapimil to 240mg once daily  - Patient encouraged to enroll in the Digital Hypertension program to help better monitor BP at home between visits    UTI   - Asymptomatic at present  - E.coli culture was only had intermeidate sensitivity to cephalexin  - Repeat UA & urine culture

## 2020-02-20 NOTE — PROGRESS NOTES
"Subjective:      Patient ID: Jennifer Motley is a 69 y.o. female.    Chief Complaint: Follow-up (f/u from urgent care ; UTI)    HPI   Patient here today for UTI treated about 3 weeks ago and symptoms have resolved. She would like to continue metformin for her diabetes. She denies any episodes of heart racing.      Review of Systems   Constitutional: Negative for activity change, appetite change, chills, diaphoresis, fatigue, fever and unexpected weight change.   HENT: Negative for congestion, ear discharge, ear pain, hearing loss, postnasal drip, rhinorrhea, sinus pressure and sore throat.    Respiratory: Negative for cough, shortness of breath and wheezing.    Cardiovascular: Negative for chest pain.   Gastrointestinal: Negative for abdominal pain, constipation, diarrhea, nausea and vomiting.   Genitourinary: Negative for dysuria and frequency.   Musculoskeletal: Negative.    Psychiatric/Behavioral: Negative for suicidal ideas.     I personally reviewed Past Medical History, Past Surgical history,  Past Social History and Family History      Objective:   /70   Pulse 86   Ht 5' 4" (1.626 m)   Wt 86.5 kg (190 lb 11.2 oz)   SpO2 98%   BMI 32.73 kg/m²     Physical Exam   Constitutional: She is oriented to person, place, and time. She appears well-developed and well-nourished. No distress.   HENT:   Head: Normocephalic and atraumatic.   Right Ear: Hearing, tympanic membrane, external ear and ear canal normal.   Left Ear: Hearing, tympanic membrane, external ear and ear canal normal.   Nose: Nose normal.   Mouth/Throat: Uvula is midline and oropharynx is clear and moist. No oropharyngeal exudate.   Eyes: Pupils are equal, round, and reactive to light. Conjunctivae and EOM are normal. Right eye exhibits no discharge. Left eye exhibits no discharge. No scleral icterus.   Neck: Normal range of motion. Neck supple.   Cardiovascular: Normal rate, regular rhythm, normal heart sounds and intact distal pulses. Exam " reveals no gallop.   No murmur heard.  Pulmonary/Chest: Effort normal and breath sounds normal. No respiratory distress. She has no wheezes. She has no rales. She exhibits no tenderness.   Abdominal: Soft. Bowel sounds are normal. She exhibits no distension and no mass. There is no tenderness. There is no rebound and no guarding.   Neurological: She is alert and oriented to person, place, and time.   Skin: Skin is warm and dry.   Vitals reviewed.      1. Essential hypertension    2. Controlled type 2 diabetes mellitus without complication, without long-term current use of insulin    3. Urinary tract infection with hematuria, site unspecified        1. Borderline control will cont to monitor and expect improvement with weight loss  2. Restart metformin  3.recheck UA/UC if symptoms reoccur     Orders Placed This Encounter   Procedures    Urinalysis, Reflex to Urine Culture Urine, Clean Catch     Medications Ordered This Encounter   Medications    metFORMIN (GLUCOPHAGE) 500 MG tablet     Sig: Take 1 tablet (500 mg total) by mouth daily with breakfast.     Dispense:  90 tablet     Refill:  3

## 2020-02-27 ENCOUNTER — HOSPITAL ENCOUNTER (OUTPATIENT)
Dept: CARDIOLOGY | Facility: CLINIC | Age: 70
Discharge: HOME | End: 2020-02-27
Payer: COMMERCIAL

## 2020-02-27 ENCOUNTER — OFFICE VISIT (OUTPATIENT)
Dept: ELECTROPHYSIOLOGY | Facility: CLINIC | Age: 70
End: 2020-02-27
Payer: COMMERCIAL

## 2020-02-27 VITALS
DIASTOLIC BLOOD PRESSURE: 63 MMHG | BODY MASS INDEX: 32.48 KG/M2 | HEART RATE: 64 BPM | WEIGHT: 190.25 LBS | SYSTOLIC BLOOD PRESSURE: 136 MMHG | HEIGHT: 64 IN

## 2020-02-27 DIAGNOSIS — I47.29 IDIOPATHIC VENTRICULAR TACHYCARDIA: ICD-10-CM

## 2020-02-27 DIAGNOSIS — E11.9 CONTROLLED TYPE 2 DIABETES MELLITUS WITHOUT COMPLICATION, WITHOUT LONG-TERM CURRENT USE OF INSULIN: ICD-10-CM

## 2020-02-27 DIAGNOSIS — I47.29 MONOMORPHIC VENTRICULAR TACHYCARDIA: ICD-10-CM

## 2020-02-27 DIAGNOSIS — R00.0 WIDE-COMPLEX TACHYCARDIA: ICD-10-CM

## 2020-02-27 DIAGNOSIS — I10 ESSENTIAL HYPERTENSION: ICD-10-CM

## 2020-02-27 DIAGNOSIS — I47.29 IDIOPATHIC VENTRICULAR TACHYCARDIA: Primary | Chronic | ICD-10-CM

## 2020-02-27 PROCEDURE — 93010 RHYTHM STRIP: ICD-10-PCS | Mod: S$GLB,,, | Performed by: INTERNAL MEDICINE

## 2020-02-27 PROCEDURE — 99213 PR OFFICE/OUTPT VISIT, EST, LEVL III, 20-29 MIN: ICD-10-PCS | Mod: S$GLB,,, | Performed by: INTERNAL MEDICINE

## 2020-02-27 PROCEDURE — 1126F PR PAIN SEVERITY QUANTIFIED, NO PAIN PRESENT: ICD-10-PCS | Mod: S$GLB,,, | Performed by: INTERNAL MEDICINE

## 2020-02-27 PROCEDURE — 3075F PR MOST RECENT SYSTOLIC BLOOD PRESS GE 130-139MM HG: ICD-10-PCS | Mod: CPTII,S$GLB,, | Performed by: INTERNAL MEDICINE

## 2020-02-27 PROCEDURE — 99999 PR PBB SHADOW E&M-EST. PATIENT-LVL III: CPT | Mod: PBBFAC,,, | Performed by: INTERNAL MEDICINE

## 2020-02-27 PROCEDURE — 3044F PR MOST RECENT HEMOGLOBIN A1C LEVEL <7.0%: ICD-10-PCS | Mod: CPTII,S$GLB,, | Performed by: INTERNAL MEDICINE

## 2020-02-27 PROCEDURE — 93010 ELECTROCARDIOGRAM REPORT: CPT | Mod: S$GLB,,, | Performed by: INTERNAL MEDICINE

## 2020-02-27 PROCEDURE — 1159F MED LIST DOCD IN RCRD: CPT | Mod: S$GLB,,, | Performed by: INTERNAL MEDICINE

## 2020-02-27 PROCEDURE — 3044F HG A1C LEVEL LT 7.0%: CPT | Mod: CPTII,S$GLB,, | Performed by: INTERNAL MEDICINE

## 2020-02-27 PROCEDURE — 99213 OFFICE O/P EST LOW 20 MIN: CPT | Mod: S$GLB,,, | Performed by: INTERNAL MEDICINE

## 2020-02-27 PROCEDURE — 93005 RHYTHM STRIP: ICD-10-PCS | Mod: S$GLB,,, | Performed by: INTERNAL MEDICINE

## 2020-02-27 PROCEDURE — 3075F SYST BP GE 130 - 139MM HG: CPT | Mod: CPTII,S$GLB,, | Performed by: INTERNAL MEDICINE

## 2020-02-27 PROCEDURE — 1101F PT FALLS ASSESS-DOCD LE1/YR: CPT | Mod: CPTII,S$GLB,, | Performed by: INTERNAL MEDICINE

## 2020-02-27 PROCEDURE — 93005 ELECTROCARDIOGRAM TRACING: CPT | Mod: S$GLB,,, | Performed by: INTERNAL MEDICINE

## 2020-02-27 PROCEDURE — 3078F PR MOST RECENT DIASTOLIC BLOOD PRESSURE < 80 MM HG: ICD-10-PCS | Mod: CPTII,S$GLB,, | Performed by: INTERNAL MEDICINE

## 2020-02-27 PROCEDURE — 1101F PR PT FALLS ASSESS DOC 0-1 FALLS W/OUT INJ PAST YR: ICD-10-PCS | Mod: CPTII,S$GLB,, | Performed by: INTERNAL MEDICINE

## 2020-02-27 PROCEDURE — 3078F DIAST BP <80 MM HG: CPT | Mod: CPTII,S$GLB,, | Performed by: INTERNAL MEDICINE

## 2020-02-27 PROCEDURE — 1159F PR MEDICATION LIST DOCUMENTED IN MEDICAL RECORD: ICD-10-PCS | Mod: S$GLB,,, | Performed by: INTERNAL MEDICINE

## 2020-02-27 PROCEDURE — 1126F AMNT PAIN NOTED NONE PRSNT: CPT | Mod: S$GLB,,, | Performed by: INTERNAL MEDICINE

## 2020-02-27 PROCEDURE — 99999 PR PBB SHADOW E&M-EST. PATIENT-LVL III: ICD-10-PCS | Mod: PBBFAC,,, | Performed by: INTERNAL MEDICINE

## 2020-02-27 NOTE — PROGRESS NOTES
Subjective:    Patient ID:  Jennifer Motley is a 69 y.o. female who presents for evaluation of Ventricular Tachycardia    Referring Cardiologist: Verónica Turk MD  Primary Care Physician: Jaqueline Byrnes MD    HPI  Prior Hx:  I had the pleasure of seeing Mrs. Motley today in our electrophysiology clinic in consultation for her ventricular arrhythmia. As you are aware she is a pleasant 68 year-old woman with obesity, hypertension and type 2 diabetes mellitus. She was first assessed by Dr. Turk in 2017 when she presented for evaluation of atypical chest pain. A stress echocardiogram was performed noting a normal LVEF, no arrhythmias during exercise, no ischemia, and a hypertensive response at peak stress (SBP 217mmHg). She was seen by Dr. Byrnes 7/2019 and reported burning type chest discomfort during stressful situations. A treadmill stress echocardiogram was ordered and performed on 8/20/2019 which showed a preserved LVEF and no ischemia however with peak exercise she developed PVCs, salvos of monomorphic VT and 1 34-beat run of monomorphic VT all of the same etiology of the PVCs (LBBB, V3 transition, inferiorly directed). She was asymptomatic from this event during her stress test. She denies any history of near syncope/syncope. She denies palpitations or exertional chest discomfort/palpitations/light-headedness. She regularly exercises on a treadmill. After discussion she elected to proceed with ILR implantation.    I reviewed available ECGs in Epic including the stress tests. ECGs consistent with sinus rhythm with normal QRS and intervals. PVCs/VT only noted on recent stress test.    Interim Hx:  ILR was implanted 11/2019. She presents for follow-up visit. No VT noted thus far on monitoring.    My interpretation of today's in clinic ECG is normal sinus rhythm with normal intervals.    Review of Systems   Constitution: Negative for fever and malaise/fatigue.   HENT: Negative for congestion and sore  throat.    Eyes: Negative for blurred vision and visual disturbance.   Cardiovascular: Negative for chest pain, dyspnea on exertion, irregular heartbeat, leg swelling, near-syncope, orthopnea, palpitations, paroxysmal nocturnal dyspnea and syncope.   Respiratory: Negative for cough and shortness of breath.    Hematologic/Lymphatic: Negative for bleeding problem. Does not bruise/bleed easily.   Skin: Negative.    Musculoskeletal: Negative.    Gastrointestinal: Negative for bloating and abdominal pain.   Neurological: Negative for dizziness and focal weakness.        Objective:    Physical Exam   Constitutional: She is oriented to person, place, and time. She appears well-developed and well-nourished. No distress.   HENT:   Head: Normocephalic and atraumatic.   Eyes: Conjunctivae are normal. Right eye exhibits no discharge. Left eye exhibits no discharge.   Neck: Neck supple. No JVD present.   Cardiovascular: Normal rate and regular rhythm. Exam reveals no gallop and no friction rub.   No murmur heard.  Pulmonary/Chest: Effort normal and breath sounds normal. No respiratory distress. She has no wheezes. She has no rales.   Abdominal: Soft. Bowel sounds are normal. She exhibits no distension. There is no tenderness. There is no rebound.   Musculoskeletal: She exhibits no edema.   Neurological: She is alert and oriented to person, place, and time.   Skin: Skin is warm and dry. She is not diaphoretic.   Psychiatric: She has a normal mood and affect. Her behavior is normal. Judgment and thought content normal.   Vitals reviewed.        Assessment:       1. Idiopathic ventricular tachycardia    2. Essential hypertension    3. Controlled type 2 diabetes mellitus without complication, without long-term current use of insulin         Plan:       In summary, Mrs. Motley is a pleasant 68 year-old woman with obesity, hypertension and type 2 diabetes mellitus presenting for evaluation of PVCs and idiopathic VT of the same etiology  on an exercise stress test performed for chest discomfort aypical for angina/ischemia. She has no evidence of ischemia on the stress test and has a preserved LVEF. She has no history of near syncope or syncope. Her PVC/VT focus is consistent with an outflow tract source, with a V2 transition ratio favoring RVOT. Discussed at initial visit prognosis and morbidity with idiopathic VT (can carry a risk of syncope however overall low risk of increased mortality) and treatment options. She desired conservative management with verapamil/ILR implant. To date no VT observed on ILR. Continue remote monitoring.    Thank you for allowing me to participate in the care of this patient. Please do not hesitate to call me with any questions or concerns.    Gerhard Cohen MD, PhD  Cardiac Electrophysiology

## 2020-03-05 ENCOUNTER — CLINICAL SUPPORT (OUTPATIENT)
Dept: CARDIOLOGY | Facility: HOSPITAL | Age: 70
End: 2020-03-05
Payer: COMMERCIAL

## 2020-03-05 DIAGNOSIS — Z95.818 PRESENCE OF OTHER CARDIAC IMPLANTS AND GRAFTS: ICD-10-CM

## 2020-03-05 PROCEDURE — G2066 INTER DEVC REMOTE 30D: HCPCS | Performed by: INTERNAL MEDICINE

## 2020-03-05 PROCEDURE — 93298 CARDIAC DEVICE CHECK - REMOTE: ICD-10-PCS | Mod: ,,, | Performed by: INTERNAL MEDICINE

## 2020-03-05 PROCEDURE — 93298 REM INTERROG DEV EVAL SCRMS: CPT | Mod: ,,, | Performed by: INTERNAL MEDICINE

## 2020-04-04 ENCOUNTER — CLINICAL SUPPORT (OUTPATIENT)
Dept: CARDIOLOGY | Facility: HOSPITAL | Age: 70
End: 2020-04-04
Payer: COMMERCIAL

## 2020-04-04 DIAGNOSIS — Z95.818 PRESENCE OF OTHER CARDIAC IMPLANTS AND GRAFTS: ICD-10-CM

## 2020-04-04 PROCEDURE — G2066 INTER DEVC REMOTE 30D: HCPCS | Performed by: INTERNAL MEDICINE

## 2020-04-04 PROCEDURE — 93298 REM INTERROG DEV EVAL SCRMS: CPT | Mod: ,,, | Performed by: INTERNAL MEDICINE

## 2020-04-04 PROCEDURE — 93298 CARDIAC DEVICE CHECK - REMOTE: ICD-10-PCS | Mod: ,,, | Performed by: INTERNAL MEDICINE

## 2020-04-23 ENCOUNTER — PATIENT MESSAGE (OUTPATIENT)
Dept: INTERNAL MEDICINE | Facility: CLINIC | Age: 70
End: 2020-04-23

## 2020-04-23 DIAGNOSIS — N89.8 VAGINA ITCHING: Primary | ICD-10-CM

## 2020-04-23 RX ORDER — FLUCONAZOLE 150 MG/1
150 TABLET ORAL DAILY
Qty: 1 TABLET | Refills: 0 | Status: SHIPPED | OUTPATIENT
Start: 2020-04-23 | End: 2020-04-24

## 2020-05-04 ENCOUNTER — CLINICAL SUPPORT (OUTPATIENT)
Dept: CARDIOLOGY | Facility: HOSPITAL | Age: 70
End: 2020-05-04
Payer: COMMERCIAL

## 2020-05-04 PROCEDURE — 93298 CARDIAC DEVICE CHECK - REMOTE: ICD-10-PCS | Mod: ,,, | Performed by: INTERNAL MEDICINE

## 2020-05-04 PROCEDURE — G2066 INTER DEVC REMOTE 30D: HCPCS | Performed by: INTERNAL MEDICINE

## 2020-05-04 PROCEDURE — 93298 REM INTERROG DEV EVAL SCRMS: CPT | Mod: ,,, | Performed by: INTERNAL MEDICINE

## 2020-06-03 ENCOUNTER — CLINICAL SUPPORT (OUTPATIENT)
Dept: CARDIOLOGY | Facility: HOSPITAL | Age: 70
End: 2020-06-03
Payer: COMMERCIAL

## 2020-06-03 DIAGNOSIS — Z95.818 PRESENCE OF OTHER CARDIAC IMPLANTS AND GRAFTS: ICD-10-CM

## 2020-06-03 PROCEDURE — G2066 INTER DEVC REMOTE 30D: HCPCS | Performed by: INTERNAL MEDICINE

## 2020-06-03 PROCEDURE — 93298 CARDIAC DEVICE CHECK - REMOTE: ICD-10-PCS | Mod: ,,, | Performed by: INTERNAL MEDICINE

## 2020-06-03 PROCEDURE — 93298 REM INTERROG DEV EVAL SCRMS: CPT | Mod: ,,, | Performed by: INTERNAL MEDICINE

## 2020-06-08 ENCOUNTER — TELEPHONE (OUTPATIENT)
Dept: INTERNAL MEDICINE | Facility: CLINIC | Age: 70
End: 2020-06-08

## 2020-07-03 ENCOUNTER — CLINICAL SUPPORT (OUTPATIENT)
Dept: CARDIOLOGY | Facility: HOSPITAL | Age: 70
End: 2020-07-03
Payer: COMMERCIAL

## 2020-07-03 DIAGNOSIS — Z95.818 PRESENCE OF OTHER CARDIAC IMPLANTS AND GRAFTS: ICD-10-CM

## 2020-07-03 PROCEDURE — 93298 CARDIAC DEVICE CHECK - REMOTE: ICD-10-PCS | Mod: ,,, | Performed by: INTERNAL MEDICINE

## 2020-07-03 PROCEDURE — 93298 REM INTERROG DEV EVAL SCRMS: CPT | Mod: ,,, | Performed by: INTERNAL MEDICINE

## 2020-07-03 PROCEDURE — G2066 INTER DEVC REMOTE 30D: HCPCS | Performed by: INTERNAL MEDICINE

## 2020-08-02 ENCOUNTER — CLINICAL SUPPORT (OUTPATIENT)
Dept: CARDIOLOGY | Facility: HOSPITAL | Age: 70
End: 2020-08-02
Payer: COMMERCIAL

## 2020-08-02 DIAGNOSIS — Z95.818 PRESENCE OF OTHER CARDIAC IMPLANTS AND GRAFTS: ICD-10-CM

## 2020-08-02 PROCEDURE — 93298 CARDIAC DEVICE CHECK - REMOTE: ICD-10-PCS | Mod: ,,, | Performed by: INTERNAL MEDICINE

## 2020-08-02 PROCEDURE — 93298 REM INTERROG DEV EVAL SCRMS: CPT | Mod: ,,, | Performed by: INTERNAL MEDICINE

## 2020-08-02 PROCEDURE — G2066 INTER DEVC REMOTE 30D: HCPCS | Performed by: INTERNAL MEDICINE

## 2020-08-27 ENCOUNTER — HOSPITAL ENCOUNTER (OUTPATIENT)
Dept: CARDIOLOGY | Facility: CLINIC | Age: 70
Discharge: HOME | End: 2020-08-27
Payer: COMMERCIAL

## 2020-08-27 ENCOUNTER — OFFICE VISIT (OUTPATIENT)
Dept: ELECTROPHYSIOLOGY | Facility: CLINIC | Age: 70
End: 2020-08-27
Payer: COMMERCIAL

## 2020-08-27 VITALS
WEIGHT: 198 LBS | HEIGHT: 64 IN | BODY MASS INDEX: 33.8 KG/M2 | DIASTOLIC BLOOD PRESSURE: 66 MMHG | SYSTOLIC BLOOD PRESSURE: 120 MMHG | HEART RATE: 69 BPM

## 2020-08-27 DIAGNOSIS — E11.9 CONTROLLED TYPE 2 DIABETES MELLITUS WITHOUT COMPLICATION, WITHOUT LONG-TERM CURRENT USE OF INSULIN: ICD-10-CM

## 2020-08-27 DIAGNOSIS — I47.20 VENTRICULAR TACHYCARDIA: ICD-10-CM

## 2020-08-27 DIAGNOSIS — I47.29 IDIOPATHIC VENTRICULAR TACHYCARDIA: Primary | Chronic | ICD-10-CM

## 2020-08-27 DIAGNOSIS — R94.39 ABNORMAL STRESS TEST: ICD-10-CM

## 2020-08-27 DIAGNOSIS — I10 ESSENTIAL HYPERTENSION: ICD-10-CM

## 2020-08-27 PROCEDURE — 3078F PR MOST RECENT DIASTOLIC BLOOD PRESSURE < 80 MM HG: ICD-10-PCS | Mod: CPTII,S$GLB,, | Performed by: INTERNAL MEDICINE

## 2020-08-27 PROCEDURE — 3008F BODY MASS INDEX DOCD: CPT | Mod: CPTII,S$GLB,, | Performed by: INTERNAL MEDICINE

## 2020-08-27 PROCEDURE — 93005 RHYTHM STRIP: ICD-10-PCS | Mod: S$GLB,,, | Performed by: INTERNAL MEDICINE

## 2020-08-27 PROCEDURE — 99214 OFFICE O/P EST MOD 30 MIN: CPT | Mod: S$GLB,,, | Performed by: INTERNAL MEDICINE

## 2020-08-27 PROCEDURE — 1159F PR MEDICATION LIST DOCUMENTED IN MEDICAL RECORD: ICD-10-PCS | Mod: S$GLB,,, | Performed by: INTERNAL MEDICINE

## 2020-08-27 PROCEDURE — 3074F SYST BP LT 130 MM HG: CPT | Mod: CPTII,S$GLB,, | Performed by: INTERNAL MEDICINE

## 2020-08-27 PROCEDURE — 1159F MED LIST DOCD IN RCRD: CPT | Mod: S$GLB,,, | Performed by: INTERNAL MEDICINE

## 2020-08-27 PROCEDURE — 93010 RHYTHM STRIP: ICD-10-PCS | Mod: S$GLB,,, | Performed by: INTERNAL MEDICINE

## 2020-08-27 PROCEDURE — 1101F PT FALLS ASSESS-DOCD LE1/YR: CPT | Mod: CPTII,S$GLB,, | Performed by: INTERNAL MEDICINE

## 2020-08-27 PROCEDURE — 93005 ELECTROCARDIOGRAM TRACING: CPT | Mod: S$GLB,,, | Performed by: INTERNAL MEDICINE

## 2020-08-27 PROCEDURE — 99999 PR PBB SHADOW E&M-EST. PATIENT-LVL III: CPT | Mod: PBBFAC,,, | Performed by: INTERNAL MEDICINE

## 2020-08-27 PROCEDURE — 3044F PR MOST RECENT HEMOGLOBIN A1C LEVEL <7.0%: ICD-10-PCS | Mod: CPTII,S$GLB,, | Performed by: INTERNAL MEDICINE

## 2020-08-27 PROCEDURE — 1126F PR PAIN SEVERITY QUANTIFIED, NO PAIN PRESENT: ICD-10-PCS | Mod: S$GLB,,, | Performed by: INTERNAL MEDICINE

## 2020-08-27 PROCEDURE — 93010 ELECTROCARDIOGRAM REPORT: CPT | Mod: S$GLB,,, | Performed by: INTERNAL MEDICINE

## 2020-08-27 PROCEDURE — 1101F PR PT FALLS ASSESS DOC 0-1 FALLS W/OUT INJ PAST YR: ICD-10-PCS | Mod: CPTII,S$GLB,, | Performed by: INTERNAL MEDICINE

## 2020-08-27 PROCEDURE — 99214 PR OFFICE/OUTPT VISIT, EST, LEVL IV, 30-39 MIN: ICD-10-PCS | Mod: S$GLB,,, | Performed by: INTERNAL MEDICINE

## 2020-08-27 PROCEDURE — 99999 PR PBB SHADOW E&M-EST. PATIENT-LVL III: ICD-10-PCS | Mod: PBBFAC,,, | Performed by: INTERNAL MEDICINE

## 2020-08-27 PROCEDURE — 1126F AMNT PAIN NOTED NONE PRSNT: CPT | Mod: S$GLB,,, | Performed by: INTERNAL MEDICINE

## 2020-08-27 PROCEDURE — 3008F PR BODY MASS INDEX (BMI) DOCUMENTED: ICD-10-PCS | Mod: CPTII,S$GLB,, | Performed by: INTERNAL MEDICINE

## 2020-08-27 PROCEDURE — 3044F HG A1C LEVEL LT 7.0%: CPT | Mod: CPTII,S$GLB,, | Performed by: INTERNAL MEDICINE

## 2020-08-27 PROCEDURE — 3074F PR MOST RECENT SYSTOLIC BLOOD PRESSURE < 130 MM HG: ICD-10-PCS | Mod: CPTII,S$GLB,, | Performed by: INTERNAL MEDICINE

## 2020-08-27 PROCEDURE — 3078F DIAST BP <80 MM HG: CPT | Mod: CPTII,S$GLB,, | Performed by: INTERNAL MEDICINE

## 2020-08-27 RX ORDER — METOPROLOL SUCCINATE 50 MG/1
50 TABLET, EXTENDED RELEASE ORAL DAILY
Qty: 90 TABLET | Refills: 3 | Status: SHIPPED | OUTPATIENT
Start: 2020-08-27 | End: 2021-03-11 | Stop reason: ALTCHOICE

## 2020-08-27 NOTE — PROGRESS NOTES
Subjective:    Patient ID:  Jennifer Motley is a 69 y.o. female who presents for evaluation of Idiopathic VT    Referring Cardiologist: Verónica Turk MD  Primary Care Physician: Jaqueline Byrnes MD    HPI  Prior Hx:  I had the pleasure of seeing Mrs. Motley today in our electrophysiology clinic in consultation for her ventricular arrhythmia. As you are aware she is a pleasant 68 year-old woman with obesity, hypertension and type 2 diabetes mellitus. She was first assessed by Dr. Turk in 2017 when she presented for evaluation of atypical chest pain. A stress echocardiogram was performed noting a normal LVEF, no arrhythmias during exercise, no ischemia, and a hypertensive response at peak stress (SBP 217mmHg). She was seen by Dr. Byrnes 7/2019 and reported burning type chest discomfort during stressful situations. A treadmill stress echocardiogram was ordered and performed on 8/20/2019 which showed a preserved LVEF and no ischemia however with peak exercise she developed PVCs, salvos of monomorphic VT and 1 34-beat run of monomorphic VT all of the same etiology of the PVCs (LBBB, V3 transition, inferiorly directed). She was asymptomatic from this event during her stress test. She denies any history of near syncope/syncope. She denies palpitations or exertional chest discomfort/palpitations/light-headedness. She regularly exercises on a treadmill. After discussion she elected to proceed with ILR implantation.    I reviewed available ECGs in Epic including the stress tests. ECGs consistent with sinus rhythm with normal QRS and intervals. PVCs/VT only noted on recent stress test.    Interim Hx:  ILR was implanted 11/2019. She presents for follow-up visit. No VT noted thus far on monitoring.    My interpretation of today's in clinic ECG is normal sinus rhythm with normal intervals.    Review of Systems   Constitution: Negative for fever and malaise/fatigue.   HENT: Negative for congestion and sore throat.     Eyes: Negative for blurred vision and visual disturbance.   Cardiovascular: Negative for chest pain, dyspnea on exertion, irregular heartbeat, leg swelling, near-syncope, orthopnea, palpitations, paroxysmal nocturnal dyspnea and syncope.   Respiratory: Negative for cough and shortness of breath.    Hematologic/Lymphatic: Negative for bleeding problem. Does not bruise/bleed easily.   Skin: Negative.    Musculoskeletal: Negative.    Gastrointestinal: Positive for constipation. Negative for bloating and abdominal pain.   Neurological: Negative for dizziness and focal weakness.        Objective:    Physical Exam   Constitutional: She is oriented to person, place, and time. She appears well-developed and well-nourished. No distress.   HENT:   Head: Normocephalic and atraumatic.   Eyes: Conjunctivae are normal. Right eye exhibits no discharge. Left eye exhibits no discharge.   Neck: Neck supple. No JVD present.   Cardiovascular: Normal rate and regular rhythm. Exam reveals no gallop and no friction rub.   No murmur heard.  Pulmonary/Chest: Effort normal and breath sounds normal. No respiratory distress. She has no wheezes. She has no rales.   Abdominal: Soft. Bowel sounds are normal. She exhibits no distension. There is no abdominal tenderness. There is no rebound.   Musculoskeletal:         General: No edema.   Neurological: She is alert and oriented to person, place, and time.   Skin: Skin is warm and dry. She is not diaphoretic.   Psychiatric: She has a normal mood and affect. Her behavior is normal. Judgment and thought content normal.   Vitals reviewed.        Assessment:       1. Idiopathic ventricular tachycardia    2. Ventricular tachycardia    3. Essential hypertension    4. Controlled type 2 diabetes mellitus without complication, without long-term current use of insulin         Plan:       In summary, Mrs. Motley is a pleasant 68 year-old woman with obesity, hypertension and type 2 diabetes mellitus presenting  for evaluation of PVCs and idiopathic VT of the same etiology on an exercise stress test performed for chest discomfort aypical for angina/ischemia. She has no evidence of ischemia on the stress test and has a preserved LVEF. She has no history of near syncope or syncope. Her PVC/VT focus is consistent with an outflow tract source, with a V2 transition ratio favoring RVOT. Discussed at initial visit prognosis and morbidity with idiopathic VT (can carry a risk of syncope however overall low risk of increased mortality) and treatment options. She desired conservative management with verapamil/ILR implant. To date no VT observed on ILR. Continue remote monitoring. She desires stopping verapamil to see how she does without it as she has some constipation. Will resume metoprolol (was stopped when we switched to verapamil.)    Continue remote ILR monitoring    RTC in 1 year.    Thank you for allowing me to participate in the care of this patient. Please do not hesitate to call me with any questions or concerns.    Gerhard Cohen MD, PhD  Cardiac Electrophysiology

## 2020-09-01 ENCOUNTER — CLINICAL SUPPORT (OUTPATIENT)
Dept: CARDIOLOGY | Facility: HOSPITAL | Age: 70
End: 2020-09-01
Payer: COMMERCIAL

## 2020-09-01 DIAGNOSIS — Z95.818 PRESENCE OF OTHER CARDIAC IMPLANTS AND GRAFTS: ICD-10-CM

## 2020-09-01 PROCEDURE — 93298 CARDIAC DEVICE CHECK - REMOTE: ICD-10-PCS | Mod: ,,, | Performed by: INTERNAL MEDICINE

## 2020-09-01 PROCEDURE — G2066 INTER DEVC REMOTE 30D: HCPCS | Performed by: INTERNAL MEDICINE

## 2020-09-01 PROCEDURE — 93298 REM INTERROG DEV EVAL SCRMS: CPT | Mod: ,,, | Performed by: INTERNAL MEDICINE

## 2020-09-28 ENCOUNTER — PATIENT MESSAGE (OUTPATIENT)
Dept: INTERNAL MEDICINE | Facility: CLINIC | Age: 70
End: 2020-09-28

## 2020-10-01 ENCOUNTER — CLINICAL SUPPORT (OUTPATIENT)
Dept: CARDIOLOGY | Facility: HOSPITAL | Age: 70
End: 2020-10-01
Payer: COMMERCIAL

## 2020-10-01 DIAGNOSIS — Z95.818 PRESENCE OF OTHER CARDIAC IMPLANTS AND GRAFTS: ICD-10-CM

## 2020-10-01 PROCEDURE — 93298 CARDIAC DEVICE CHECK - REMOTE: ICD-10-PCS | Mod: ,,, | Performed by: INTERNAL MEDICINE

## 2020-10-01 PROCEDURE — 93298 REM INTERROG DEV EVAL SCRMS: CPT | Mod: ,,, | Performed by: INTERNAL MEDICINE

## 2020-10-01 PROCEDURE — G2066 INTER DEVC REMOTE 30D: HCPCS | Performed by: INTERNAL MEDICINE

## 2020-10-31 ENCOUNTER — CLINICAL SUPPORT (OUTPATIENT)
Dept: CARDIOLOGY | Facility: HOSPITAL | Age: 70
End: 2020-10-31
Payer: COMMERCIAL

## 2020-10-31 DIAGNOSIS — Z95.818 PRESENCE OF OTHER CARDIAC IMPLANTS AND GRAFTS: ICD-10-CM

## 2020-10-31 PROCEDURE — 93298 CARDIAC DEVICE CHECK - REMOTE: ICD-10-PCS | Mod: ,,, | Performed by: INTERNAL MEDICINE

## 2020-10-31 PROCEDURE — 93298 REM INTERROG DEV EVAL SCRMS: CPT | Mod: ,,, | Performed by: INTERNAL MEDICINE

## 2020-10-31 PROCEDURE — G2066 INTER DEVC REMOTE 30D: HCPCS | Performed by: INTERNAL MEDICINE

## 2020-11-03 PROBLEM — Z80.41 FAMILY HISTORY OF OVARIAN CANCER: Status: ACTIVE | Noted: 2020-09-01

## 2020-11-11 ENCOUNTER — TELEPHONE (OUTPATIENT)
Dept: INTERNAL MEDICINE | Facility: CLINIC | Age: 70
End: 2020-11-11

## 2020-11-11 DIAGNOSIS — I10 ESSENTIAL HYPERTENSION: Primary | ICD-10-CM

## 2020-11-11 DIAGNOSIS — E55.9 VITAMIN D DEFICIENCY: ICD-10-CM

## 2020-11-11 DIAGNOSIS — E04.1 THYROID CYST: ICD-10-CM

## 2020-11-11 DIAGNOSIS — I47.20 VENTRICULAR TACHYCARDIA: ICD-10-CM

## 2020-11-11 DIAGNOSIS — E11.9 CONTROLLED TYPE 2 DIABETES MELLITUS WITHOUT COMPLICATION, WITHOUT LONG-TERM CURRENT USE OF INSULIN: ICD-10-CM

## 2020-11-11 NOTE — TELEPHONE ENCOUNTER
Patient is requesting labs before her NP patient with Dr. Mistry on 11/17/2020. Please advise    Patient was seen by Dr. Byrnes previously.

## 2020-11-12 ENCOUNTER — PATIENT MESSAGE (OUTPATIENT)
Dept: INTERNAL MEDICINE | Facility: CLINIC | Age: 70
End: 2020-11-12

## 2020-11-16 ENCOUNTER — LAB VISIT (OUTPATIENT)
Dept: LAB | Facility: OTHER | Age: 70
End: 2020-11-16
Attending: INTERNAL MEDICINE
Payer: COMMERCIAL

## 2020-11-16 DIAGNOSIS — I10 ESSENTIAL HYPERTENSION: ICD-10-CM

## 2020-11-16 DIAGNOSIS — E11.9 CONTROLLED TYPE 2 DIABETES MELLITUS WITHOUT COMPLICATION, WITHOUT LONG-TERM CURRENT USE OF INSULIN: ICD-10-CM

## 2020-11-16 DIAGNOSIS — E55.9 VITAMIN D DEFICIENCY: ICD-10-CM

## 2020-11-16 DIAGNOSIS — E04.1 THYROID CYST: ICD-10-CM

## 2020-11-16 DIAGNOSIS — I47.20 VENTRICULAR TACHYCARDIA: ICD-10-CM

## 2020-11-16 LAB
25(OH)D3+25(OH)D2 SERPL-MCNC: 46 NG/ML (ref 30–96)
ALBUMIN SERPL BCP-MCNC: 4 G/DL (ref 3.5–5.2)
ALP SERPL-CCNC: 69 U/L (ref 55–135)
ALT SERPL W/O P-5'-P-CCNC: 25 U/L (ref 10–44)
ANION GAP SERPL CALC-SCNC: 8 MMOL/L (ref 8–16)
AST SERPL-CCNC: 20 U/L (ref 10–40)
BASOPHILS # BLD AUTO: 0.01 K/UL (ref 0–0.2)
BASOPHILS NFR BLD: 0.2 % (ref 0–1.9)
BILIRUB SERPL-MCNC: 0.5 MG/DL (ref 0.1–1)
BUN SERPL-MCNC: 16 MG/DL (ref 8–23)
CALCIUM SERPL-MCNC: 9.8 MG/DL (ref 8.7–10.5)
CHLORIDE SERPL-SCNC: 103 MMOL/L (ref 95–110)
CHOLEST SERPL-MCNC: 186 MG/DL (ref 120–199)
CHOLEST/HDLC SERPL: 3.2 {RATIO} (ref 2–5)
CO2 SERPL-SCNC: 29 MMOL/L (ref 23–29)
CREAT SERPL-MCNC: 0.8 MG/DL (ref 0.5–1.4)
DIFFERENTIAL METHOD: ABNORMAL
EOSINOPHIL # BLD AUTO: 0.1 K/UL (ref 0–0.5)
EOSINOPHIL NFR BLD: 1.4 % (ref 0–8)
ERYTHROCYTE [DISTWIDTH] IN BLOOD BY AUTOMATED COUNT: 14.7 % (ref 11.5–14.5)
EST. GFR  (AFRICAN AMERICAN): >60 ML/MIN/1.73 M^2
EST. GFR  (NON AFRICAN AMERICAN): >60 ML/MIN/1.73 M^2
ESTIMATED AVG GLUCOSE: 140 MG/DL (ref 68–131)
GLUCOSE SERPL-MCNC: 118 MG/DL (ref 70–110)
HBA1C MFR BLD HPLC: 6.5 % (ref 4–5.6)
HCT VFR BLD AUTO: 39.7 % (ref 37–48.5)
HDLC SERPL-MCNC: 59 MG/DL (ref 40–75)
HDLC SERPL: 31.7 % (ref 20–50)
HGB BLD-MCNC: 12.4 G/DL (ref 12–16)
IMM GRANULOCYTES # BLD AUTO: 0.01 K/UL (ref 0–0.04)
IMM GRANULOCYTES NFR BLD AUTO: 0.2 % (ref 0–0.5)
LDLC SERPL CALC-MCNC: 105 MG/DL (ref 63–159)
LYMPHOCYTES # BLD AUTO: 2 K/UL (ref 1–4.8)
LYMPHOCYTES NFR BLD: 35.2 % (ref 18–48)
MCH RBC QN AUTO: 28.2 PG (ref 27–31)
MCHC RBC AUTO-ENTMCNC: 31.2 G/DL (ref 32–36)
MCV RBC AUTO: 90 FL (ref 82–98)
MONOCYTES # BLD AUTO: 0.3 K/UL (ref 0.3–1)
MONOCYTES NFR BLD: 6 % (ref 4–15)
NEUTROPHILS # BLD AUTO: 3.2 K/UL (ref 1.8–7.7)
NEUTROPHILS NFR BLD: 57 % (ref 38–73)
NONHDLC SERPL-MCNC: 127 MG/DL
NRBC BLD-RTO: 0 /100 WBC
PLATELET # BLD AUTO: 204 K/UL (ref 150–350)
PMV BLD AUTO: 11.3 FL (ref 9.2–12.9)
POTASSIUM SERPL-SCNC: 4.2 MMOL/L (ref 3.5–5.1)
PROT SERPL-MCNC: 8.1 G/DL (ref 6–8.4)
RBC # BLD AUTO: 4.39 M/UL (ref 4–5.4)
SODIUM SERPL-SCNC: 140 MMOL/L (ref 136–145)
TRIGL SERPL-MCNC: 110 MG/DL (ref 30–150)
TSH SERPL DL<=0.005 MIU/L-ACNC: 0.85 UIU/ML (ref 0.4–4)
WBC # BLD AUTO: 5.63 K/UL (ref 3.9–12.7)

## 2020-11-16 PROCEDURE — 82306 VITAMIN D 25 HYDROXY: CPT

## 2020-11-16 PROCEDURE — 80053 COMPREHEN METABOLIC PANEL: CPT

## 2020-11-16 PROCEDURE — 80061 LIPID PANEL: CPT

## 2020-11-16 PROCEDURE — 85025 COMPLETE CBC W/AUTO DIFF WBC: CPT

## 2020-11-16 PROCEDURE — 84443 ASSAY THYROID STIM HORMONE: CPT

## 2020-11-16 PROCEDURE — 83036 HEMOGLOBIN GLYCOSYLATED A1C: CPT

## 2020-11-16 PROCEDURE — 36415 COLL VENOUS BLD VENIPUNCTURE: CPT

## 2020-11-17 ENCOUNTER — OFFICE VISIT (OUTPATIENT)
Dept: INTERNAL MEDICINE | Facility: CLINIC | Age: 70
End: 2020-11-17
Payer: COMMERCIAL

## 2020-11-17 VITALS
OXYGEN SATURATION: 97 % | HEART RATE: 74 BPM | BODY MASS INDEX: 33.8 KG/M2 | WEIGHT: 198 LBS | HEIGHT: 64 IN | SYSTOLIC BLOOD PRESSURE: 121 MMHG | DIASTOLIC BLOOD PRESSURE: 59 MMHG

## 2020-11-17 DIAGNOSIS — E66.9 OBESITY (BMI 30.0-34.9): ICD-10-CM

## 2020-11-17 DIAGNOSIS — M85.80 OSTEOPENIA, UNSPECIFIED LOCATION: ICD-10-CM

## 2020-11-17 DIAGNOSIS — Z00.00 ANNUAL PHYSICAL EXAM: Primary | ICD-10-CM

## 2020-11-17 DIAGNOSIS — E11.9 CONTROLLED TYPE 2 DIABETES MELLITUS WITHOUT COMPLICATION, WITHOUT LONG-TERM CURRENT USE OF INSULIN: ICD-10-CM

## 2020-11-17 DIAGNOSIS — I10 ESSENTIAL HYPERTENSION: ICD-10-CM

## 2020-11-17 DIAGNOSIS — Z91.89 CANDIDATE FOR STATIN THERAPY DUE TO RISK OF FUTURE CARDIOVASCULAR EVENT: ICD-10-CM

## 2020-11-17 DIAGNOSIS — Z23 NEED FOR ZOSTER VACCINE: ICD-10-CM

## 2020-11-17 DIAGNOSIS — Z12.39 ENCOUNTER FOR SCREENING FOR MALIGNANT NEOPLASM OF BREAST, UNSPECIFIED SCREENING MODALITY: ICD-10-CM

## 2020-11-17 PROCEDURE — 3044F HG A1C LEVEL LT 7.0%: CPT | Mod: CPTII,S$GLB,, | Performed by: INTERNAL MEDICINE

## 2020-11-17 PROCEDURE — 99999 PR PBB SHADOW E&M-EST. PATIENT-LVL V: CPT | Mod: PBBFAC,,, | Performed by: INTERNAL MEDICINE

## 2020-11-17 PROCEDURE — 1101F PR PT FALLS ASSESS DOC 0-1 FALLS W/OUT INJ PAST YR: ICD-10-PCS | Mod: CPTII,S$GLB,, | Performed by: INTERNAL MEDICINE

## 2020-11-17 PROCEDURE — 99397 PER PM REEVAL EST PAT 65+ YR: CPT | Mod: S$GLB,,, | Performed by: INTERNAL MEDICINE

## 2020-11-17 PROCEDURE — 3008F PR BODY MASS INDEX (BMI) DOCUMENTED: ICD-10-PCS | Mod: CPTII,S$GLB,, | Performed by: INTERNAL MEDICINE

## 2020-11-17 PROCEDURE — 1101F PT FALLS ASSESS-DOCD LE1/YR: CPT | Mod: CPTII,S$GLB,, | Performed by: INTERNAL MEDICINE

## 2020-11-17 PROCEDURE — 1126F PR PAIN SEVERITY QUANTIFIED, NO PAIN PRESENT: ICD-10-PCS | Mod: S$GLB,,, | Performed by: INTERNAL MEDICINE

## 2020-11-17 PROCEDURE — 3008F BODY MASS INDEX DOCD: CPT | Mod: CPTII,S$GLB,, | Performed by: INTERNAL MEDICINE

## 2020-11-17 PROCEDURE — 99397 PR PREVENTIVE VISIT,EST,65 & OVER: ICD-10-PCS | Mod: S$GLB,,, | Performed by: INTERNAL MEDICINE

## 2020-11-17 PROCEDURE — 3044F PR MOST RECENT HEMOGLOBIN A1C LEVEL <7.0%: ICD-10-PCS | Mod: CPTII,S$GLB,, | Performed by: INTERNAL MEDICINE

## 2020-11-17 PROCEDURE — 3074F SYST BP LT 130 MM HG: CPT | Mod: CPTII,S$GLB,, | Performed by: INTERNAL MEDICINE

## 2020-11-17 PROCEDURE — 3288F FALL RISK ASSESSMENT DOCD: CPT | Mod: CPTII,S$GLB,, | Performed by: INTERNAL MEDICINE

## 2020-11-17 PROCEDURE — 3078F PR MOST RECENT DIASTOLIC BLOOD PRESSURE < 80 MM HG: ICD-10-PCS | Mod: CPTII,S$GLB,, | Performed by: INTERNAL MEDICINE

## 2020-11-17 PROCEDURE — 99999 PR PBB SHADOW E&M-EST. PATIENT-LVL V: ICD-10-PCS | Mod: PBBFAC,,, | Performed by: INTERNAL MEDICINE

## 2020-11-17 PROCEDURE — 1126F AMNT PAIN NOTED NONE PRSNT: CPT | Mod: S$GLB,,, | Performed by: INTERNAL MEDICINE

## 2020-11-17 PROCEDURE — 3078F DIAST BP <80 MM HG: CPT | Mod: CPTII,S$GLB,, | Performed by: INTERNAL MEDICINE

## 2020-11-17 PROCEDURE — 3074F PR MOST RECENT SYSTOLIC BLOOD PRESSURE < 130 MM HG: ICD-10-PCS | Mod: CPTII,S$GLB,, | Performed by: INTERNAL MEDICINE

## 2020-11-17 PROCEDURE — 3288F PR FALLS RISK ASSESSMENT DOCUMENTED: ICD-10-PCS | Mod: CPTII,S$GLB,, | Performed by: INTERNAL MEDICINE

## 2020-11-17 RX ORDER — ATORVASTATIN CALCIUM 40 MG/1
40 TABLET, FILM COATED ORAL DAILY
Qty: 90 TABLET | Refills: 3 | Status: CANCELLED | OUTPATIENT
Start: 2020-11-17 | End: 2021-11-17

## 2020-11-17 NOTE — PROGRESS NOTES
Subjective:       Patient ID: Jennifer Motley is a 70 y.o. female who  has a past medical history of Diabetes mellitus type II, controlled, Hypertension, Idiopathic ventricular tachycardia, Obese, Ocular hypertension, Overactive bladder, and Thyroid cyst.    Chief Complaint: Establish Care and Hypertension     History was obtained from the patient and supplemented through chart review  She was previously seen by Dr. Byrnes  for HTN  -Reviewed outside records [place] [facility] [place]  -OBGYN at [facility] Dr. Natalie Balderas.    Works as a .    HPI    HTN:    Pt's BP is controlled on HCTZ 25, Toprol 50.  History of Vtach as below.  Verapamil was switched to Toprol due to constipation.  However, she will run out of Toprol soon and wishes to restart old tabs of verapamil x 1 month to avoid buying new medications.  Afterwards, she plans to switch back to Toprol.  BP has been well controlled and denies CP, SOB, lightheadedness, dizziness.      DM2 is controlled.  Currently pt is taking metformin 500 daily, but missed some doses, qOD. Denies GI side effects, such as abd cramping, loose stool, nausea.    Exercise: not as much lately. 3-5 miles 3/week.    Diet: not much red meat.    Without elevated microalbumin creatinine ratio.  Not on an ACE/ARB.   Retinal exams: 11/2020.  Dr. Brenda Hensley at [facility] for glaucoma, retinal tear. No retinopathy.  Foot exams:    Hemoglobin A1C   Date Value Ref Range Status   11/16/2020 6.5 (H) 4.0 - 5.6 % Final     Comment:     ADA Screening Guidelines:  5.7-6.4%  Consistent with prediabetes  >or=6.5%  Consistent with diabetes  High levels of fetal hemoglobin interfere with the HbA1C  assay. Heterozygous hemoglobin variants (HbS, HgC, etc)do  not significantly interfere with this assay.   However, presence of multiple variants may affect accuracy.     02/18/2020 6.1 (H) 4.0 - 5.6 % Final     Comment:     ADA Screening Guidelines:  5.7-6.4%  Consistent with  prediabetes  >or=6.5%  Consistent with diabetes  High levels of fetal hemoglobin interfere with the HbA1C  assay. Heterozygous hemoglobin variants (HbS, HgC, etc)do  not significantly interfere with this assay.   However, presence of multiple variants may affect accuracy.     07/25/2019 6.7 (H) 4.0 - 5.6 % Final     Comment:     ADA Screening Guidelines:  5.7-6.4%  Consistent with prediabetes  >or=6.5%  Consistent with diabetes  High levels of fetal hemoglobin interfere with the HbA1C  assay. Heterozygous hemoglobin variants (HbS, HgC, etc)do  not significantly interfere with this assay.   However, presence of multiple variants may affect accuracy.       Candidate for statin therapy due to increased cardiovascular risk:    FLP WNL. ASCVD high.  Not on statin. +DM.  Lab Results   Component Value Date    LDLCALC 105.0 11/16/2020     The 10-year ASCVD risk score (Eric GRIFFIN JrKendall, et al., 2013) is: 21.4%    Values used to calculate the score:      Age: 70 years      Sex: Female      Is Non- : Yes      Diabetic: Yes      Tobacco smoker: No      Systolic Blood Pressure: 121 mmHg      Is BP treated: Yes      HDL Cholesterol: 59 mg/dL      Total Cholesterol: 186 mg/dL    H/o osteopenia, Vitamin D deficiency:   OSH DXA at [facility].  Has repeat DEXA next month.  Is taking Ca/vit D supplements.  Lab Results   Component Value Date    CVQSXPOO63UD 46 11/16/2020     Obesity:  BMI 33.  On metformin.  Exercise as above.                  Not addressed today.  Thyroid cyst:  No thyroid ultrasound in our records or on care everywhere.  Followed by OSH endocrinologist at [facility], Dr. Barnett. Was reportedly small and told to monitor every few years or so.   TSH WNL.  No thyroid ultrasound in our records.  Follows with OSH endocrinologist.  Lab Results   Component Value Date    TSH 0.847 11/16/2020     H/o Vtach:  Had stress d/t deaths in the family.  Stress TTE  with normal EF, no ischemia, but did develop PVCs,  VT.  Was asx during the stress test.  ILR was implanted  w/o VT.  Wanted to stop verapamil due to constipation.  On Toprol 50. Follows with Cards/EP annually and continuing to monitor.    Review of Systems   Constitutional: Negative for activity change and unexpected weight change.   HENT: Negative for hearing loss, rhinorrhea and trouble swallowing.    Eyes: Negative for discharge and visual disturbance.   Respiratory: Negative for chest tightness and wheezing.    Cardiovascular: Negative for chest pain and palpitations.   Gastrointestinal: Negative for blood in stool, constipation, diarrhea and vomiting.   Endocrine: Negative for polydipsia and polyuria.   Genitourinary: Negative for difficulty urinating, dysuria, hematuria and menstrual problem.   Musculoskeletal: Negative for arthralgias, joint swelling and neck pain.   Skin: Negative for rash and wound.   Neurological: Negative for weakness and headaches.   Hematological: Negative for adenopathy.   Psychiatric/Behavioral: Negative for confusion and dysphoric mood.         Past Medical History:   Diagnosis Date    Diabetes mellitus type II, controlled     Hypertension     Idiopathic ventricular tachycardia     Obese     Ocular hypertension     Overactive bladder     Thyroid cyst      Past Surgical History:   Procedure Laterality Date    INSERTION OF IMPLANTABLE LOOP RECORDER N/A 11/22/2019    Procedure: INSERTION, IMPLANTABLE LOOP RECORDER;  Surgeon: Gerhard Cohen MD;  Location: [facility] EP LAB;  Service: Cardiology;  Laterality: N/A;  VT, ILR, MDT, Local, 3 Prep     Family History   Problem Relation Age of Onset    Hypertension Mother     Stroke Mother     Glaucoma Mother     Diabetes Father     Coronary artery disease Sister         CABG x 3     Heart attack Sister     Dementia Sister     Stroke Brother     Coronary artery disease Brother         CABG    Diabetes Sister     Colon cancer Neg Hx      Social History     Socioeconomic  "History    Marital status:      Spouse name: Not on file    Number of children: Not on file    Years of education: Not on file    Highest education level: Not on file   Occupational History    Not on file   Social Needs    Financial resource strain: Not very hard    Food insecurity     Worry: Never true     Inability: Never true    Transportation needs     Medical: No     Non-medical: No   Tobacco Use    Smoking status: Never Smoker    Smokeless tobacco: Never Used   Substance and Sexual Activity    Alcohol use: Yes     Alcohol/week: 2.0 - 3.0 standard drinks     Types: 2 - 3 Standard drinks or equivalent per week     Frequency: 2-3 times a week     Drinks per session: 1 or 2     Binge frequency: Never    Drug use: No    Sexual activity: Yes     Partners: Male   Lifestyle    Physical activity     Days per week: 3 days     Minutes per session: 30 min    Stress: To some extent   Relationships    Social connections     Talks on phone: More than three times a week     Gets together: Patient refused     Attends [employer] service: Not on file     Active member of club or organization: Yes     Attends meetings of clubs or organizations: More than 4 times per year     Relationship status:    Other Topics Concern    Not on file   Social History Narrative    Not on file     Objective:      Vitals:    11/17/20 1515 11/17/20 1519   BP: (!) 157/66 (!) 121/59   Pulse: 74    SpO2: 97%    Weight: 89.8 kg (197 lb 15.6 oz)    Height: 5' 4" (1.626 m)       Physical Exam  Constitutional:       General: She is not in acute distress.     Appearance: She is well-developed. She is not diaphoretic.   HENT:      Head: Normocephalic and atraumatic.      Nose: Nose normal.      Mouth/Throat:      Pharynx: No oropharyngeal exudate.   Eyes:      General: No scleral icterus.        Right eye: No discharge.         Left eye: No discharge.   Neck:      Musculoskeletal: Neck supple.      Thyroid: No thyromegaly.      " Trachea: No tracheal deviation.   Cardiovascular:      Rate and Rhythm: Normal rate and regular rhythm.      Heart sounds: Normal heart sounds. No murmur.   Pulmonary:      Effort: Pulmonary effort is normal. No respiratory distress.      Breath sounds: Normal breath sounds. No wheezing.   Abdominal:      General: Bowel sounds are normal. There is no distension.      Palpations: Abdomen is soft.      Tenderness: There is no abdominal tenderness.   Musculoskeletal:         General: No deformity.      Right lower leg: No edema.      Left lower leg: No edema.   Lymphadenopathy:      Cervical: No cervical adenopathy.   Skin:     General: Skin is warm and dry.      Findings: No erythema.      Comments: Keloid on mid left chest near ILR.   Neurological:      Mental Status: She is alert.      Cranial Nerves: No cranial nerve deficit.      Gait: Gait normal.   Psychiatric:         Behavior: Behavior normal.           Lab Results   Component Value Date    WBC 5.63 11/16/2020    HGB 12.4 11/16/2020    HCT 39.7 11/16/2020     11/16/2020    CHOL 186 11/16/2020    TRIG 110 11/16/2020    HDL 59 11/16/2020    ALT 25 11/16/2020    AST 20 11/16/2020     11/16/2020    K 4.2 11/16/2020     11/16/2020    CREATININE 0.8 11/16/2020    BUN 16 11/16/2020    CO2 29 11/16/2020    TSH 0.847 11/16/2020    HGBA1C 6.5 (H) 11/16/2020       The 10-year ASCVD risk score (Woodbridge GABY Jr., et al., 2013) is: 21.4%    Values used to calculate the score:      Age: 70 years      Sex: Female      Is Non- : Yes      Diabetic: Yes      Tobacco smoker: No      Systolic Blood Pressure: 121 mmHg      Is BP treated: Yes      HDL Cholesterol: 59 mg/dL      Total Cholesterol: 186 mg/dL    (Imaging have been independently reviewed)  None    OS MMG 2018 BI-RADS 2    Assessment:       1. Annual physical exam    2. Essential hypertension    3. Controlled type 2 diabetes mellitus without complication, without long-term current  use of insulin    4. Candidate for statin therapy due to risk of future cardiovascular event    5. Osteopenia, unspecified location    6. Obesity (BMI 30.0-34.9)    7. Encounter for screening for malignant neoplasm of breast, unspecified screening modality    8. Need for zoster vaccine          Plan:       Jennifer was seen today for establish care and hypertension.    Diagnoses and all orders for this visit:    Annual physical exam    Essential hypertension  Comments:  Controlled. Cont HCTZ 25. H/o VT. OK to finish old verapamil and then switch back to Toprol 50 when she runs out. Return prompts, f/u Cards.    Controlled type 2 diabetes mellitus without complication, without long-term current use of insulin  Comments:  Increased. Restart metformin 500 daily.  A1c in 6 months.  Schedule foot exam. Encouraged exercise, well balanced diet.  Orders:  -     Ambulatory referral/consult to Podiatry; Future  -     Hemoglobin A1C; Future  -     Microalbumin/Creatinine Ratio, Urine; Future    Candidate for statin therapy due to risk of future cardiovascular event  Comments:  FLP, but ASCVD elevated. +DM. Discussed R/B; she will consider starting statin.    Osteopenia, unspecified location  Comments:  Continue calcium, vitamin-D supplement.  Has repeat OSH DEXA next month.    Obesity (BMI 30.0-34.9)  Comments:  Stable.  Restart metformin.  Encouraged exercise.    Encounter for screening for malignant neoplasm of breast, unspecified screening modality  Comments:  Has OSH mammogram scheduled next month.    Need for zoster vaccine  Comments:  Refused despite discussion of R/B.    Other orders  -     Cancel: Mammo Digital Screening Bilat w/ Calvin; Future  -     Cancel: DXA Bone Density Spine And Hip; Future  -     Cancel: atorvastatin (LIPITOR) 40 MG tablet; Take 1 tablet (40 mg total) by mouth once daily.         Side effects of medication(s) were discussed in detail and patient voiced understanding.  Patient will call back for any  issues or complications.     RTC in 6 month(s) or sooner PRN for DM with lab prior.

## 2020-11-27 ENCOUNTER — OFFICE VISIT (OUTPATIENT)
Dept: PODIATRY | Facility: CLINIC | Age: 70
End: 2020-11-27
Payer: COMMERCIAL

## 2020-11-27 VITALS
DIASTOLIC BLOOD PRESSURE: 70 MMHG | HEART RATE: 55 BPM | HEIGHT: 64 IN | SYSTOLIC BLOOD PRESSURE: 146 MMHG | WEIGHT: 197 LBS | BODY MASS INDEX: 33.63 KG/M2

## 2020-11-27 DIAGNOSIS — M20.11 HALLUX VALGUS OF RIGHT FOOT: ICD-10-CM

## 2020-11-27 DIAGNOSIS — E11.42 TYPE 2 DIABETES MELLITUS WITH DIABETIC POLYNEUROPATHY, UNSPECIFIED WHETHER LONG TERM INSULIN USE: ICD-10-CM

## 2020-11-27 DIAGNOSIS — E11.9 CONTROLLED TYPE 2 DIABETES MELLITUS WITHOUT COMPLICATION, WITHOUT LONG-TERM CURRENT USE OF INSULIN: ICD-10-CM

## 2020-11-27 DIAGNOSIS — M20.41 HAMMER TOES OF BOTH FEET: Primary | ICD-10-CM

## 2020-11-27 DIAGNOSIS — M20.42 HAMMER TOES OF BOTH FEET: Primary | ICD-10-CM

## 2020-11-27 PROCEDURE — 3044F HG A1C LEVEL LT 7.0%: CPT | Mod: CPTII,S$GLB,, | Performed by: PODIATRIST

## 2020-11-27 PROCEDURE — 3008F PR BODY MASS INDEX (BMI) DOCUMENTED: ICD-10-PCS | Mod: CPTII,S$GLB,, | Performed by: PODIATRIST

## 2020-11-27 PROCEDURE — 3077F SYST BP >= 140 MM HG: CPT | Mod: CPTII,S$GLB,, | Performed by: PODIATRIST

## 2020-11-27 PROCEDURE — 3078F PR MOST RECENT DIASTOLIC BLOOD PRESSURE < 80 MM HG: ICD-10-PCS | Mod: CPTII,S$GLB,, | Performed by: PODIATRIST

## 2020-11-27 PROCEDURE — 1126F PR PAIN SEVERITY QUANTIFIED, NO PAIN PRESENT: ICD-10-PCS | Mod: S$GLB,,, | Performed by: PODIATRIST

## 2020-11-27 PROCEDURE — 3077F PR MOST RECENT SYSTOLIC BLOOD PRESSURE >= 140 MM HG: ICD-10-PCS | Mod: CPTII,S$GLB,, | Performed by: PODIATRIST

## 2020-11-27 PROCEDURE — 3078F DIAST BP <80 MM HG: CPT | Mod: CPTII,S$GLB,, | Performed by: PODIATRIST

## 2020-11-27 PROCEDURE — 99999 PR PBB SHADOW E&M-EST. PATIENT-LVL IV: CPT | Mod: PBBFAC,,, | Performed by: PODIATRIST

## 2020-11-27 PROCEDURE — 1159F MED LIST DOCD IN RCRD: CPT | Mod: S$GLB,,, | Performed by: PODIATRIST

## 2020-11-27 PROCEDURE — 99999 PR PBB SHADOW E&M-EST. PATIENT-LVL IV: ICD-10-PCS | Mod: PBBFAC,,, | Performed by: PODIATRIST

## 2020-11-27 PROCEDURE — 3288F FALL RISK ASSESSMENT DOCD: CPT | Mod: CPTII,S$GLB,, | Performed by: PODIATRIST

## 2020-11-27 PROCEDURE — 3288F PR FALLS RISK ASSESSMENT DOCUMENTED: ICD-10-PCS | Mod: CPTII,S$GLB,, | Performed by: PODIATRIST

## 2020-11-27 PROCEDURE — 99203 PR OFFICE/OUTPT VISIT, NEW, LEVL III, 30-44 MIN: ICD-10-PCS | Mod: S$GLB,,, | Performed by: PODIATRIST

## 2020-11-27 PROCEDURE — 1126F AMNT PAIN NOTED NONE PRSNT: CPT | Mod: S$GLB,,, | Performed by: PODIATRIST

## 2020-11-27 PROCEDURE — 1101F PR PT FALLS ASSESS DOC 0-1 FALLS W/OUT INJ PAST YR: ICD-10-PCS | Mod: CPTII,S$GLB,, | Performed by: PODIATRIST

## 2020-11-27 PROCEDURE — 3044F PR MOST RECENT HEMOGLOBIN A1C LEVEL <7.0%: ICD-10-PCS | Mod: CPTII,S$GLB,, | Performed by: PODIATRIST

## 2020-11-27 PROCEDURE — 1101F PT FALLS ASSESS-DOCD LE1/YR: CPT | Mod: CPTII,S$GLB,, | Performed by: PODIATRIST

## 2020-11-27 PROCEDURE — 99203 OFFICE O/P NEW LOW 30 MIN: CPT | Mod: S$GLB,,, | Performed by: PODIATRIST

## 2020-11-27 PROCEDURE — 1159F PR MEDICATION LIST DOCUMENTED IN MEDICAL RECORD: ICD-10-PCS | Mod: S$GLB,,, | Performed by: PODIATRIST

## 2020-11-27 PROCEDURE — 3008F BODY MASS INDEX DOCD: CPT | Mod: CPTII,S$GLB,, | Performed by: PODIATRIST

## 2020-11-27 RX ORDER — AMMONIUM LACTATE 12 G/100G
CREAM TOPICAL
Qty: 140 G | Refills: 11 | Status: SHIPPED | OUTPATIENT
Start: 2020-11-27 | End: 2021-11-18

## 2020-11-27 NOTE — LETTER
November 27, 2020      Tahira Mistry MD  [address]  [address]  [facility] [address]           [facility]  [address] [address]  Phone: [phone]  Fax: [phone]          Patient: Jennifer Motley   MR Number: [id number]   YOB: 1950   Date of Visit: 11/27/2020       Dear Dr. Tahira Mistry:    Thank you for referring Jennifer Motley to me for evaluation. Attached you will find relevant portions of my assessment and plan of care.    If you have questions, please do not hesitate to call me. I look forward to following Jennifer Motley along with you.    Sincerely,    Abel Weston, DPDENISE    Enclosure  CC:  No Recipients    If you would like to receive this communication electronically, please contact [email or web address] or [phone] to request more information on [employer] access.    For providers and/or their staff who would like to refer a patient to [facility], please contact us through our one-stop-shop provider referral line, [facility], at [phone].    If you feel you have received this communication in error or would no longer like to receive these types of communications, please e-mail [email or web address]

## 2020-11-27 NOTE — PROGRESS NOTES
Subjective:      Patient ID: Jennifer Motley is a 70 y.o. female.    Chief Complaint: Diabetic Foot Exam (Dr Tahira Mistry MD 11-) and Diabetes Mellitus    Jennifer is a 70 y.o. female who presents to the clinic upon referral from Dr. Mistry  for evaluation and treatment of diabetic feet. Jennifer has a past medical history of Diabetes mellitus type II, controlled, Hypertension, Idiopathic ventricular tachycardia, Obese, Ocular hypertension, Overactive bladder, and Thyroid cyst. Patient relates no major problem with feet. Only complaints today consist of Diabetes, increased risk amputation needing evaluation/management/optomization of foot care. .    PCP: Tahira Mistry MD    Date Last Seen by PCP:   Chief Complaint   Patient presents with    Diabetic Foot Exam     Dr Tahira Mistry MD 11-    Diabetes Mellitus         Current shoe gear: Casual shoes    Hemoglobin A1C   Date Value Ref Range Status   11/16/2020 6.5 (H) 4.0 - 5.6 % Final     Comment:     ADA Screening Guidelines:  5.7-6.4%  Consistent with prediabetes  >or=6.5%  Consistent with diabetes  High levels of fetal hemoglobin interfere with the HbA1C  assay. Heterozygous hemoglobin variants (HbS, HgC, etc)do  not significantly interfere with this assay.   However, presence of multiple variants may affect accuracy.     02/18/2020 6.1 (H) 4.0 - 5.6 % Final     Comment:     ADA Screening Guidelines:  5.7-6.4%  Consistent with prediabetes  >or=6.5%  Consistent with diabetes  High levels of fetal hemoglobin interfere with the HbA1C  assay. Heterozygous hemoglobin variants (HbS, HgC, etc)do  not significantly interfere with this assay.   However, presence of multiple variants may affect accuracy.     07/25/2019 6.7 (H) 4.0 - 5.6 % Final     Comment:     ADA Screening Guidelines:  5.7-6.4%  Consistent with prediabetes  >or=6.5%  Consistent with diabetes  High levels of fetal hemoglobin interfere with the HbA1C  assay. Heterozygous hemoglobin variants (HbS,  HgC, etc)do  not significantly interfere with this assay.   However, presence of multiple variants may affect accuracy.             Review of Systems   Constitution: Negative for chills, diaphoresis, fever, malaise/fatigue and night sweats.   Cardiovascular: Negative for claudication, cyanosis, leg swelling and syncope.   Skin: Negative for color change, dry skin, nail changes, rash, suspicious lesions and unusual hair distribution.   Musculoskeletal: Negative for falls, joint pain, joint swelling, muscle cramps, muscle weakness and stiffness.   Gastrointestinal: Negative for constipation, diarrhea, nausea and vomiting.   Neurological: Positive for sensory change. Negative for brief paralysis, disturbances in coordination, focal weakness, numbness, paresthesias and tremors.           Objective:      Physical Exam  Constitutional:       General: She is not in acute distress.     Appearance: She is well-developed. She is not diaphoretic.   Cardiovascular:      Pulses:           Popliteal pulses are 2+ on the right side and 2+ on the left side.        Dorsalis pedis pulses are 2+ on the right side and 2+ on the left side.        Posterior tibial pulses are 2+ on the right side and 2+ on the left side.      Comments: Capillary refill 3 seconds all toes/distal feet, all toes/both feet warm to touch.      Negative lymphadenopathy bilateral popliteal fossa and tarsal tunnel.      Negavie lower extremity edema bilateral.    Musculoskeletal:      Right ankle: She exhibits normal range of motion, no swelling, no ecchymosis, no deformity, no laceration and normal pulse. Achilles tendon normal. Achilles tendon exhibits no pain, no defect and normal Yepez's test results.      Comments: Bunion/hallux valgus right without symptom.    Patient has hammertoes of digits    5 bilateral               partially reducible without symptom today.    Otherwise, Normal angle, base, station of gait. All ten toes without clubbing, cyanosis,  or signs of ischemia.  No pain to palpation bilateral lower extremities.  Range of motion, stability, muscle strength, and muscle tone normal bilateral feet and legs.    Lymphadenopathy:      Lower Body: No right inguinal adenopathy. No left inguinal adenopathy.      Comments: Negative lymphadenopathy bilateral popliteal fossa and tarsal tunnel.    Negative lymphangitic streaking bilateral feet/ankles/legs.   Skin:     General: Skin is warm and dry.      Capillary Refill: Capillary refill takes 2 to 3 seconds.      Coloration: Skin is not pale.      Findings: No abrasion, bruising, burn, ecchymosis, erythema, laceration, lesion or rash.      Nails: There is no clubbing.        Comments:   Skin is normal age and health appropriate color, turgor, texture, and temperature bilateral lower extremities without ulceration, hyperpigmentation, discoloration, masses nodules or cords palpated.  No ecchymosis, erythema, edema, or cardinal signs of infection bilateral lower extremities.       Neurological:      Mental Status: She is alert and oriented to person, place, and time.      Sensory: Sensory deficit present.      Motor: No tremor, atrophy or abnormal muscle tone.      Gait: Gait normal.      Deep Tendon Reflexes:      Reflex Scores:       Patellar reflexes are 2+ on the right side and 2+ on the left side.       Achilles reflexes are 2+ on the right side and 2+ on the left side.     Comments: Negative tinel sign to percussion sural, superficial peroneal, deep peroneal, saphenous, and posterior tibial nerves right and left ankles and feet.    Decreased/absent vibratory sensation bilateral feet to 128Hz tuning fork.     Psychiatric:         Behavior: Behavior is cooperative.               Assessment:       Encounter Diagnoses   Name Primary?    Controlled type 2 diabetes mellitus without complication, without long-term current use of insulin     Hammer toes of both feet Yes    Hallux valgus of right foot     Type 2  diabetes mellitus with diabetic polyneuropathy, unspecified whether long term insulin use          Plan:       Jennifer was seen today for diabetic foot exam and diabetes mellitus.    Diagnoses and all orders for this visit:    Hammer toes of both feet    Controlled type 2 diabetes mellitus without complication, without long-term current use of insulin  Comments:  Increased. Restart metformin 500 daily.  A1c in 6 months.  Schedule foot exam. Encouraged exercise, well balanced diet.  Orders:  -     Ambulatory referral/consult to Podiatry    Hallux valgus of right foot    Type 2 diabetes mellitus with diabetic polyneuropathy, unspecified whether long term insulin use      I counseled the patient on her conditions, their implications and medical management.        - Shoe inspection. Diabetic Foot Education. Patient reminded of the importance of good nutrition and blood sugar control to help prevent podiatric complications of diabetes. Patient instructed on proper foot hygeine. We discussed wearing proper shoe gear, daily foot inspections, never walking without protective shoe gear, never putting sharp instruments to feet, routine podiatric this at least annual.      Discussed conservative treatment with shoes of adequate dimensions, material, and style to alleviate symptoms and delay or prevent surgical intervention.    Lac hydrin.    Declines diabetic shoes.  Discussed and patient verbally acknowledges the increased risk of ulceration, infection, amputaiton, sepsis, and death.            Follow up in about 1 year (around 11/27/2021).

## 2020-11-30 ENCOUNTER — CLINICAL SUPPORT (OUTPATIENT)
Dept: CARDIOLOGY | Facility: HOSPITAL | Age: 70
End: 2020-11-30
Payer: COMMERCIAL

## 2020-11-30 DIAGNOSIS — Z95.818 PRESENCE OF OTHER CARDIAC IMPLANTS AND GRAFTS: ICD-10-CM

## 2020-11-30 PROCEDURE — 93298 CARDIAC DEVICE CHECK - REMOTE: ICD-10-PCS | Mod: ,,, | Performed by: INTERNAL MEDICINE

## 2020-11-30 PROCEDURE — 93298 REM INTERROG DEV EVAL SCRMS: CPT | Mod: ,,, | Performed by: INTERNAL MEDICINE

## 2020-11-30 PROCEDURE — G2066 INTER DEVC REMOTE 30D: HCPCS | Performed by: INTERNAL MEDICINE

## 2020-12-02 ENCOUNTER — PATIENT MESSAGE (OUTPATIENT)
Dept: ADMINISTRATIVE | Facility: HOSPITAL | Age: 70
End: 2020-12-02

## 2020-12-24 DIAGNOSIS — Z12.31 OTHER SCREENING MAMMOGRAM: ICD-10-CM

## 2020-12-30 ENCOUNTER — CLINICAL SUPPORT (OUTPATIENT)
Dept: CARDIOLOGY | Facility: HOSPITAL | Age: 70
End: 2020-12-30
Payer: COMMERCIAL

## 2020-12-30 DIAGNOSIS — Z95.818 PRESENCE OF OTHER CARDIAC IMPLANTS AND GRAFTS: ICD-10-CM

## 2020-12-30 PROCEDURE — 93298 CARDIAC DEVICE CHECK - REMOTE: ICD-10-PCS | Mod: ,,, | Performed by: INTERNAL MEDICINE

## 2020-12-30 PROCEDURE — 93298 REM INTERROG DEV EVAL SCRMS: CPT | Mod: ,,, | Performed by: INTERNAL MEDICINE

## 2020-12-30 PROCEDURE — G2066 INTER DEVC REMOTE 30D: HCPCS | Performed by: INTERNAL MEDICINE

## 2021-01-11 ENCOUNTER — PATIENT MESSAGE (OUTPATIENT)
Dept: INTERNAL MEDICINE | Facility: CLINIC | Age: 71
End: 2021-01-11

## 2021-01-21 DIAGNOSIS — Z12.11 COLON CANCER SCREENING: ICD-10-CM

## 2021-01-29 ENCOUNTER — CLINICAL SUPPORT (OUTPATIENT)
Dept: CARDIOLOGY | Facility: HOSPITAL | Age: 71
End: 2021-01-29
Payer: COMMERCIAL

## 2021-01-29 DIAGNOSIS — Z95.818 PRESENCE OF OTHER CARDIAC IMPLANTS AND GRAFTS: ICD-10-CM

## 2021-01-29 PROCEDURE — 93298 CARDIAC DEVICE CHECK - REMOTE: ICD-10-PCS | Mod: ,,, | Performed by: INTERNAL MEDICINE

## 2021-01-29 PROCEDURE — 93298 REM INTERROG DEV EVAL SCRMS: CPT | Mod: ,,, | Performed by: INTERNAL MEDICINE

## 2021-01-29 PROCEDURE — G2066 INTER DEVC REMOTE 30D: HCPCS | Performed by: INTERNAL MEDICINE

## 2021-02-03 ENCOUNTER — PATIENT MESSAGE (OUTPATIENT)
Dept: INTERNAL MEDICINE | Facility: CLINIC | Age: 71
End: 2021-02-03

## 2021-02-03 DIAGNOSIS — R30.0 DYSURIA: Primary | ICD-10-CM

## 2021-02-04 ENCOUNTER — LAB VISIT (OUTPATIENT)
Dept: LAB | Facility: OTHER | Age: 71
End: 2021-02-04
Attending: INTERNAL MEDICINE
Payer: COMMERCIAL

## 2021-02-04 DIAGNOSIS — R30.0 DYSURIA: ICD-10-CM

## 2021-02-04 PROCEDURE — 87088 URINE BACTERIA CULTURE: CPT

## 2021-02-04 PROCEDURE — 87077 CULTURE AEROBIC IDENTIFY: CPT

## 2021-02-04 PROCEDURE — 81000 URINALYSIS NONAUTO W/SCOPE: CPT

## 2021-02-04 PROCEDURE — 87086 URINE CULTURE/COLONY COUNT: CPT

## 2021-02-04 PROCEDURE — 87186 SC STD MICRODIL/AGAR DIL: CPT

## 2021-02-05 ENCOUNTER — TELEPHONE (OUTPATIENT)
Dept: INTERNAL MEDICINE | Facility: CLINIC | Age: 71
End: 2021-02-05

## 2021-02-05 DIAGNOSIS — N39.0 UTI (URINARY TRACT INFECTION), UNCOMPLICATED: Primary | ICD-10-CM

## 2021-02-05 LAB
BACTERIA #/AREA URNS HPF: ABNORMAL /HPF
BILIRUB UR QL STRIP: NEGATIVE
CLARITY UR: CLEAR
COLOR UR: YELLOW
GLUCOSE UR QL STRIP: NEGATIVE
HGB UR QL STRIP: ABNORMAL
HYALINE CASTS #/AREA URNS LPF: 0 /LPF
KETONES UR QL STRIP: NEGATIVE
LEUKOCYTE ESTERASE UR QL STRIP: ABNORMAL
MICROSCOPIC COMMENT: ABNORMAL
NITRITE UR QL STRIP: POSITIVE
PH UR STRIP: 6 [PH] (ref 5–8)
PROT UR QL STRIP: ABNORMAL
RBC #/AREA URNS HPF: >100 /HPF (ref 0–4)
SP GR UR STRIP: 1.01 (ref 1–1.03)
SQUAMOUS #/AREA URNS HPF: 3 /HPF
URN SPEC COLLECT METH UR: ABNORMAL
UROBILINOGEN UR STRIP-ACNC: 1 EU/DL
WBC #/AREA URNS HPF: >100 /HPF (ref 0–5)
WBC CLUMPS URNS QL MICRO: ABNORMAL

## 2021-02-05 RX ORDER — PHENAZOPYRIDINE HYDROCHLORIDE 200 MG/1
200 TABLET, FILM COATED ORAL 3 TIMES DAILY PRN
Qty: 9 TABLET | Refills: 0 | Status: SHIPPED | OUTPATIENT
Start: 2021-02-05 | End: 2021-02-08

## 2021-02-05 RX ORDER — NITROFURANTOIN (MACROCRYSTALS) 100 MG/1
100 CAPSULE ORAL 2 TIMES DAILY
Qty: 10 CAPSULE | Refills: 0 | Status: SHIPPED | OUTPATIENT
Start: 2021-02-05 | End: 2021-02-10

## 2021-02-08 LAB — BACTERIA UR CULT: ABNORMAL

## 2021-02-15 ENCOUNTER — PATIENT MESSAGE (OUTPATIENT)
Dept: INTERNAL MEDICINE | Facility: CLINIC | Age: 71
End: 2021-02-15

## 2021-02-16 ENCOUNTER — OFFICE VISIT (OUTPATIENT)
Dept: URGENT CARE | Facility: CLINIC | Age: 71
End: 2021-02-16
Payer: COMMERCIAL

## 2021-02-16 VITALS
SYSTOLIC BLOOD PRESSURE: 127 MMHG | DIASTOLIC BLOOD PRESSURE: 54 MMHG | RESPIRATION RATE: 19 BRPM | BODY MASS INDEX: 34.15 KG/M2 | HEIGHT: 64 IN | TEMPERATURE: 98 F | WEIGHT: 200 LBS | HEART RATE: 59 BPM | OXYGEN SATURATION: 96 %

## 2021-02-16 DIAGNOSIS — B37.9 ANTIBIOTIC-INDUCED YEAST INFECTION: Primary | ICD-10-CM

## 2021-02-16 DIAGNOSIS — T36.95XA ANTIBIOTIC-INDUCED YEAST INFECTION: Primary | ICD-10-CM

## 2021-02-16 PROCEDURE — 99214 PR OFFICE/OUTPT VISIT, EST, LEVL IV, 30-39 MIN: ICD-10-PCS | Mod: S$GLB,,, | Performed by: NURSE PRACTITIONER

## 2021-02-16 PROCEDURE — 99214 OFFICE O/P EST MOD 30 MIN: CPT | Mod: S$GLB,,, | Performed by: NURSE PRACTITIONER

## 2021-02-16 PROCEDURE — 3008F PR BODY MASS INDEX (BMI) DOCUMENTED: ICD-10-PCS | Mod: CPTII,S$GLB,, | Performed by: NURSE PRACTITIONER

## 2021-02-16 PROCEDURE — 3008F BODY MASS INDEX DOCD: CPT | Mod: CPTII,S$GLB,, | Performed by: NURSE PRACTITIONER

## 2021-02-16 RX ORDER — FLUCONAZOLE 150 MG/1
150 TABLET ORAL DAILY
Qty: 1 TABLET | Refills: 0 | Status: SHIPPED | OUTPATIENT
Start: 2021-02-16 | End: 2021-02-17

## 2021-02-28 ENCOUNTER — CLINICAL SUPPORT (OUTPATIENT)
Dept: CARDIOLOGY | Facility: HOSPITAL | Age: 71
End: 2021-02-28
Attending: INTERNAL MEDICINE
Payer: COMMERCIAL

## 2021-02-28 DIAGNOSIS — Z95.818 PRESENCE OF OTHER CARDIAC IMPLANTS AND GRAFTS: ICD-10-CM

## 2021-02-28 PROCEDURE — G2066 INTER DEVC REMOTE 30D: HCPCS | Performed by: INTERNAL MEDICINE

## 2021-02-28 PROCEDURE — 93298 CARDIAC DEVICE CHECK - REMOTE: ICD-10-PCS | Mod: ,,, | Performed by: INTERNAL MEDICINE

## 2021-02-28 PROCEDURE — 93298 REM INTERROG DEV EVAL SCRMS: CPT | Mod: ,,, | Performed by: INTERNAL MEDICINE

## 2021-03-02 ENCOUNTER — PATIENT MESSAGE (OUTPATIENT)
Dept: INTERNAL MEDICINE | Facility: CLINIC | Age: 71
End: 2021-03-02

## 2021-03-08 ENCOUNTER — PATIENT MESSAGE (OUTPATIENT)
Dept: INTERNAL MEDICINE | Facility: CLINIC | Age: 71
End: 2021-03-08

## 2021-03-11 ENCOUNTER — PATIENT MESSAGE (OUTPATIENT)
Dept: ELECTROPHYSIOLOGY | Facility: CLINIC | Age: 71
End: 2021-03-11

## 2021-03-12 ENCOUNTER — PATIENT MESSAGE (OUTPATIENT)
Dept: ELECTROPHYSIOLOGY | Facility: CLINIC | Age: 71
End: 2021-03-12

## 2021-03-12 RX ORDER — VERAPAMIL HYDROCHLORIDE 180 MG/1
180 TABLET, FILM COATED, EXTENDED RELEASE ORAL NIGHTLY
Qty: 90 TABLET | Refills: 3 | Status: SHIPPED | OUTPATIENT
Start: 2021-03-12 | End: 2021-10-29 | Stop reason: SDUPTHER

## 2021-03-30 ENCOUNTER — CLINICAL SUPPORT (OUTPATIENT)
Dept: CARDIOLOGY | Facility: HOSPITAL | Age: 71
End: 2021-03-30
Payer: COMMERCIAL

## 2021-03-30 DIAGNOSIS — Z95.818 PRESENCE OF OTHER CARDIAC IMPLANTS AND GRAFTS: ICD-10-CM

## 2021-03-30 PROCEDURE — G2066 INTER DEVC REMOTE 30D: HCPCS | Performed by: INTERNAL MEDICINE

## 2021-03-30 PROCEDURE — 93298 CARDIAC DEVICE CHECK - REMOTE: ICD-10-PCS | Mod: ,,, | Performed by: INTERNAL MEDICINE

## 2021-03-30 PROCEDURE — 93298 REM INTERROG DEV EVAL SCRMS: CPT | Mod: ,,, | Performed by: INTERNAL MEDICINE

## 2021-04-16 ENCOUNTER — PATIENT MESSAGE (OUTPATIENT)
Dept: RESEARCH | Facility: HOSPITAL | Age: 71
End: 2021-04-16

## 2021-04-21 ENCOUNTER — PATIENT MESSAGE (OUTPATIENT)
Dept: INTERNAL MEDICINE | Facility: CLINIC | Age: 71
End: 2021-04-21

## 2021-04-29 ENCOUNTER — CLINICAL SUPPORT (OUTPATIENT)
Dept: CARDIOLOGY | Facility: HOSPITAL | Age: 71
End: 2021-04-29
Payer: COMMERCIAL

## 2021-04-29 DIAGNOSIS — Z95.818 PRESENCE OF OTHER CARDIAC IMPLANTS AND GRAFTS: ICD-10-CM

## 2021-04-29 PROCEDURE — 93298 REM INTERROG DEV EVAL SCRMS: CPT | Mod: ,,, | Performed by: INTERNAL MEDICINE

## 2021-04-29 PROCEDURE — 93298 CARDIAC DEVICE CHECK - REMOTE: ICD-10-PCS | Mod: ,,, | Performed by: INTERNAL MEDICINE

## 2021-04-29 PROCEDURE — G2066 INTER DEVC REMOTE 30D: HCPCS | Performed by: INTERNAL MEDICINE

## 2021-05-12 ENCOUNTER — PATIENT MESSAGE (OUTPATIENT)
Dept: INTERNAL MEDICINE | Facility: CLINIC | Age: 71
End: 2021-05-12

## 2021-05-13 ENCOUNTER — LAB VISIT (OUTPATIENT)
Dept: LAB | Facility: OTHER | Age: 71
End: 2021-05-13
Attending: INTERNAL MEDICINE
Payer: COMMERCIAL

## 2021-05-13 DIAGNOSIS — E11.9 CONTROLLED TYPE 2 DIABETES MELLITUS WITHOUT COMPLICATION, WITHOUT LONG-TERM CURRENT USE OF INSULIN: ICD-10-CM

## 2021-05-13 PROCEDURE — 82570 ASSAY OF URINE CREATININE: CPT | Performed by: INTERNAL MEDICINE

## 2021-05-13 PROCEDURE — 82043 UR ALBUMIN QUANTITATIVE: CPT | Performed by: INTERNAL MEDICINE

## 2021-05-14 LAB
ALBUMIN/CREAT UR: 12.5 UG/MG (ref 0–30)
CREAT UR-MCNC: 191.7 MG/DL (ref 15–325)
MICROALBUMIN UR DL<=1MG/L-MCNC: 24 UG/ML

## 2021-05-18 ENCOUNTER — OFFICE VISIT (OUTPATIENT)
Dept: INTERNAL MEDICINE | Facility: CLINIC | Age: 71
End: 2021-05-18
Payer: COMMERCIAL

## 2021-05-18 ENCOUNTER — OFFICE VISIT (OUTPATIENT)
Dept: SLEEP MEDICINE | Facility: CLINIC | Age: 71
End: 2021-05-18
Payer: COMMERCIAL

## 2021-05-18 ENCOUNTER — PATIENT OUTREACH (OUTPATIENT)
Dept: ADMINISTRATIVE | Facility: OTHER | Age: 71
End: 2021-05-18

## 2021-05-18 VITALS
HEIGHT: 64 IN | HEART RATE: 60 BPM | BODY MASS INDEX: 34.31 KG/M2 | SYSTOLIC BLOOD PRESSURE: 145 MMHG | WEIGHT: 201 LBS | DIASTOLIC BLOOD PRESSURE: 69 MMHG

## 2021-05-18 VITALS
HEIGHT: 64 IN | WEIGHT: 201.5 LBS | HEART RATE: 66 BPM | OXYGEN SATURATION: 98 % | DIASTOLIC BLOOD PRESSURE: 49 MMHG | BODY MASS INDEX: 34.4 KG/M2 | SYSTOLIC BLOOD PRESSURE: 125 MMHG

## 2021-05-18 DIAGNOSIS — Z12.12 ENCOUNTER FOR COLORECTAL CANCER SCREENING: ICD-10-CM

## 2021-05-18 DIAGNOSIS — I10 ESSENTIAL HYPERTENSION: Primary | ICD-10-CM

## 2021-05-18 DIAGNOSIS — R06.83 SNORING: ICD-10-CM

## 2021-05-18 DIAGNOSIS — Z91.89 CANDIDATE FOR STATIN THERAPY DUE TO RISK OF FUTURE CARDIOVASCULAR EVENT: ICD-10-CM

## 2021-05-18 DIAGNOSIS — E11.9 CONTROLLED TYPE 2 DIABETES MELLITUS WITHOUT COMPLICATION, WITHOUT LONG-TERM CURRENT USE OF INSULIN: ICD-10-CM

## 2021-05-18 DIAGNOSIS — M85.80 OSTEOPENIA, UNSPECIFIED LOCATION: ICD-10-CM

## 2021-05-18 DIAGNOSIS — Z12.11 ENCOUNTER FOR COLORECTAL CANCER SCREENING: ICD-10-CM

## 2021-05-18 DIAGNOSIS — E66.9 OBESITY (BMI 30.0-34.9): ICD-10-CM

## 2021-05-18 PROCEDURE — 99999 PR PBB SHADOW E&M-EST. PATIENT-LVL IV: ICD-10-PCS | Mod: PBBFAC,,, | Performed by: INTERNAL MEDICINE

## 2021-05-18 PROCEDURE — 99215 OFFICE O/P EST HI 40 MIN: CPT | Mod: S$GLB,,, | Performed by: INTERNAL MEDICINE

## 2021-05-18 PROCEDURE — 1101F PT FALLS ASSESS-DOCD LE1/YR: CPT | Mod: CPTII,S$GLB,, | Performed by: INTERNAL MEDICINE

## 2021-05-18 PROCEDURE — 1101F PR PT FALLS ASSESS DOC 0-1 FALLS W/OUT INJ PAST YR: ICD-10-PCS | Mod: CPTII,S$GLB,, | Performed by: INTERNAL MEDICINE

## 2021-05-18 PROCEDURE — 3288F PR FALLS RISK ASSESSMENT DOCUMENTED: ICD-10-PCS | Mod: CPTII,S$GLB,, | Performed by: INTERNAL MEDICINE

## 2021-05-18 PROCEDURE — 3288F FALL RISK ASSESSMENT DOCD: CPT | Mod: CPTII,S$GLB,, | Performed by: INTERNAL MEDICINE

## 2021-05-18 PROCEDURE — 3078F DIAST BP <80 MM HG: CPT | Mod: CPTII,S$GLB,, | Performed by: INTERNAL MEDICINE

## 2021-05-18 PROCEDURE — 3074F PR MOST RECENT SYSTOLIC BLOOD PRESSURE < 130 MM HG: ICD-10-PCS | Mod: CPTII,S$GLB,, | Performed by: INTERNAL MEDICINE

## 2021-05-18 PROCEDURE — 99204 OFFICE O/P NEW MOD 45 MIN: CPT | Mod: S$GLB,,, | Performed by: INTERNAL MEDICINE

## 2021-05-18 PROCEDURE — 3008F PR BODY MASS INDEX (BMI) DOCUMENTED: ICD-10-PCS | Mod: CPTII,S$GLB,, | Performed by: INTERNAL MEDICINE

## 2021-05-18 PROCEDURE — 3078F PR MOST RECENT DIASTOLIC BLOOD PRESSURE < 80 MM HG: ICD-10-PCS | Mod: CPTII,S$GLB,, | Performed by: INTERNAL MEDICINE

## 2021-05-18 PROCEDURE — 1126F PR PAIN SEVERITY QUANTIFIED, NO PAIN PRESENT: ICD-10-PCS | Mod: S$GLB,,, | Performed by: INTERNAL MEDICINE

## 2021-05-18 PROCEDURE — 1159F MED LIST DOCD IN RCRD: CPT | Mod: S$GLB,,, | Performed by: INTERNAL MEDICINE

## 2021-05-18 PROCEDURE — 1159F PR MEDICATION LIST DOCUMENTED IN MEDICAL RECORD: ICD-10-PCS | Mod: S$GLB,,, | Performed by: INTERNAL MEDICINE

## 2021-05-18 PROCEDURE — 99215 PR OFFICE/OUTPT VISIT, EST, LEVL V, 40-54 MIN: ICD-10-PCS | Mod: S$GLB,,, | Performed by: INTERNAL MEDICINE

## 2021-05-18 PROCEDURE — 1126F AMNT PAIN NOTED NONE PRSNT: CPT | Mod: S$GLB,,, | Performed by: INTERNAL MEDICINE

## 2021-05-18 PROCEDURE — 3044F HG A1C LEVEL LT 7.0%: CPT | Mod: CPTII,S$GLB,, | Performed by: INTERNAL MEDICINE

## 2021-05-18 PROCEDURE — 3008F BODY MASS INDEX DOCD: CPT | Mod: CPTII,S$GLB,, | Performed by: INTERNAL MEDICINE

## 2021-05-18 PROCEDURE — 99999 PR PBB SHADOW E&M-EST. PATIENT-LVL IV: CPT | Mod: PBBFAC,,, | Performed by: INTERNAL MEDICINE

## 2021-05-18 PROCEDURE — 99204 PR OFFICE/OUTPT VISIT, NEW, LEVL IV, 45-59 MIN: ICD-10-PCS | Mod: S$GLB,,, | Performed by: INTERNAL MEDICINE

## 2021-05-18 PROCEDURE — 3074F SYST BP LT 130 MM HG: CPT | Mod: CPTII,S$GLB,, | Performed by: INTERNAL MEDICINE

## 2021-05-18 PROCEDURE — 3044F PR MOST RECENT HEMOGLOBIN A1C LEVEL <7.0%: ICD-10-PCS | Mod: CPTII,S$GLB,, | Performed by: INTERNAL MEDICINE

## 2021-05-18 RX ORDER — DORZOLAMIDE HCL 20 MG/ML
SOLUTION/ DROPS OPHTHALMIC
COMMUNITY
Start: 2021-05-11 | End: 2021-10-18 | Stop reason: SDUPTHER

## 2021-05-18 RX ORDER — TRAVOPROST 0.04 MG/ML
1 SOLUTION/ DROPS OPHTHALMIC NIGHTLY
COMMUNITY
Start: 2021-05-11 | End: 2021-10-18 | Stop reason: SDUPTHER

## 2021-05-18 RX ORDER — TRAVOPROST OPHTHALMIC SOLUTION 0.04 MG/ML
SOLUTION OPHTHALMIC
COMMUNITY
Start: 2020-09-25 | End: 2022-02-14 | Stop reason: SDUPTHER

## 2021-05-18 RX ORDER — METFORMIN HYDROCHLORIDE 500 MG/1
500 TABLET ORAL
Qty: 90 TABLET | Refills: 3 | Status: SHIPPED | OUTPATIENT
Start: 2021-05-18 | End: 2022-05-18 | Stop reason: SDUPTHER

## 2021-05-29 ENCOUNTER — CLINICAL SUPPORT (OUTPATIENT)
Dept: CARDIOLOGY | Facility: HOSPITAL | Age: 71
End: 2021-05-29
Payer: COMMERCIAL

## 2021-05-29 DIAGNOSIS — Z95.818 PRESENCE OF OTHER CARDIAC IMPLANTS AND GRAFTS: ICD-10-CM

## 2021-05-29 PROCEDURE — G2066 INTER DEVC REMOTE 30D: HCPCS | Performed by: INTERNAL MEDICINE

## 2021-05-29 PROCEDURE — 93298 CARDIAC DEVICE CHECK - REMOTE: ICD-10-PCS | Mod: ,,, | Performed by: INTERNAL MEDICINE

## 2021-05-29 PROCEDURE — 93298 REM INTERROG DEV EVAL SCRMS: CPT | Mod: ,,, | Performed by: INTERNAL MEDICINE

## 2021-06-11 LAB — NONINV COLON CA DNA+OCC BLD SCRN STL QL: NEGATIVE

## 2021-06-28 ENCOUNTER — CLINICAL SUPPORT (OUTPATIENT)
Dept: CARDIOLOGY | Facility: HOSPITAL | Age: 71
End: 2021-06-28
Payer: COMMERCIAL

## 2021-06-28 DIAGNOSIS — Z95.818 PRESENCE OF OTHER CARDIAC IMPLANTS AND GRAFTS: ICD-10-CM

## 2021-06-28 PROCEDURE — 93298 REM INTERROG DEV EVAL SCRMS: CPT | Mod: ,,, | Performed by: INTERNAL MEDICINE

## 2021-06-28 PROCEDURE — 93298 CARDIAC DEVICE CHECK - REMOTE: ICD-10-PCS | Mod: ,,, | Performed by: INTERNAL MEDICINE

## 2021-06-28 PROCEDURE — G2066 INTER DEVC REMOTE 30D: HCPCS | Performed by: INTERNAL MEDICINE

## 2021-07-19 ENCOUNTER — PATIENT MESSAGE (OUTPATIENT)
Dept: INTERNAL MEDICINE | Facility: CLINIC | Age: 71
End: 2021-07-19

## 2021-07-28 ENCOUNTER — CLINICAL SUPPORT (OUTPATIENT)
Dept: CARDIOLOGY | Facility: HOSPITAL | Age: 71
End: 2021-07-28
Payer: MEDICARE

## 2021-07-28 DIAGNOSIS — Z95.818 PRESENCE OF OTHER CARDIAC IMPLANTS AND GRAFTS: ICD-10-CM

## 2021-07-28 PROCEDURE — 93298 CARDIAC DEVICE CHECK - REMOTE: ICD-10-PCS | Mod: ,,, | Performed by: INTERNAL MEDICINE

## 2021-07-28 PROCEDURE — G2066 INTER DEVC REMOTE 30D: HCPCS | Performed by: INTERNAL MEDICINE

## 2021-07-28 PROCEDURE — 93298 REM INTERROG DEV EVAL SCRMS: CPT | Mod: ,,, | Performed by: INTERNAL MEDICINE

## 2021-08-06 RX ORDER — HYDROCHLOROTHIAZIDE 25 MG/1
25 TABLET ORAL DAILY
Qty: 90 TABLET | Refills: 3 | Status: SHIPPED | OUTPATIENT
Start: 2021-08-06 | End: 2021-08-12 | Stop reason: SDUPTHER

## 2021-08-12 DIAGNOSIS — I10 ESSENTIAL HYPERTENSION: Primary | ICD-10-CM

## 2021-08-13 DIAGNOSIS — I49.8 OTHER SPECIFIED CARDIAC ARRHYTHMIAS: Primary | ICD-10-CM

## 2021-08-13 RX ORDER — HYDROCHLOROTHIAZIDE 25 MG/1
25 TABLET ORAL DAILY
Qty: 90 TABLET | Refills: 3 | Status: SHIPPED | OUTPATIENT
Start: 2021-08-13 | End: 2021-08-17 | Stop reason: SDUPTHER

## 2021-08-17 DIAGNOSIS — I10 ESSENTIAL HYPERTENSION: ICD-10-CM

## 2021-08-17 RX ORDER — HYDROCHLOROTHIAZIDE 25 MG/1
25 TABLET ORAL DAILY
Qty: 90 TABLET | Refills: 0 | Status: SHIPPED | OUTPATIENT
Start: 2021-08-17 | End: 2021-09-22 | Stop reason: SDUPTHER

## 2021-08-24 ENCOUNTER — PATIENT OUTREACH (OUTPATIENT)
Dept: ADMINISTRATIVE | Facility: HOSPITAL | Age: 71
End: 2021-08-24

## 2021-08-27 ENCOUNTER — CLINICAL SUPPORT (OUTPATIENT)
Dept: CARDIOLOGY | Facility: HOSPITAL | Age: 71
End: 2021-08-27
Payer: MEDICARE

## 2021-08-27 DIAGNOSIS — Z95.818 PRESENCE OF OTHER CARDIAC IMPLANTS AND GRAFTS: ICD-10-CM

## 2021-08-27 PROCEDURE — G2066 INTER DEVC REMOTE 30D: HCPCS | Performed by: INTERNAL MEDICINE

## 2021-08-27 PROCEDURE — 93298 REM INTERROG DEV EVAL SCRMS: CPT | Mod: ,,, | Performed by: INTERNAL MEDICINE

## 2021-08-27 PROCEDURE — 93298 CARDIAC DEVICE CHECK - REMOTE: ICD-10-PCS | Mod: ,,, | Performed by: INTERNAL MEDICINE

## 2021-09-03 ENCOUNTER — PATIENT OUTREACH (OUTPATIENT)
Dept: ADMINISTRATIVE | Facility: OTHER | Age: 71
End: 2021-09-03

## 2021-09-07 ENCOUNTER — OFFICE VISIT (OUTPATIENT)
Dept: ELECTROPHYSIOLOGY | Facility: CLINIC | Age: 71
End: 2021-09-07
Payer: MEDICARE

## 2021-09-07 ENCOUNTER — HOSPITAL ENCOUNTER (OUTPATIENT)
Dept: CARDIOLOGY | Facility: CLINIC | Age: 71
Discharge: HOME | End: 2021-09-07
Payer: MEDICARE

## 2021-09-07 VITALS
SYSTOLIC BLOOD PRESSURE: 144 MMHG | BODY MASS INDEX: 33.38 KG/M2 | WEIGHT: 195.56 LBS | HEIGHT: 64 IN | DIASTOLIC BLOOD PRESSURE: 67 MMHG | HEART RATE: 68 BPM

## 2021-09-07 DIAGNOSIS — I10 ESSENTIAL HYPERTENSION: ICD-10-CM

## 2021-09-07 DIAGNOSIS — E11.9 CONTROLLED TYPE 2 DIABETES MELLITUS WITHOUT COMPLICATION, WITHOUT LONG-TERM CURRENT USE OF INSULIN: ICD-10-CM

## 2021-09-07 DIAGNOSIS — E66.9 OBESITY (BMI 30.0-34.9): ICD-10-CM

## 2021-09-07 DIAGNOSIS — I49.8 OTHER SPECIFIED CARDIAC ARRHYTHMIAS: ICD-10-CM

## 2021-09-07 DIAGNOSIS — I47.29 IDIOPATHIC VENTRICULAR TACHYCARDIA: Primary | Chronic | ICD-10-CM

## 2021-09-07 PROCEDURE — 3066F NEPHROPATHY DOC TX: CPT | Mod: CPTII,S$GLB,, | Performed by: INTERNAL MEDICINE

## 2021-09-07 PROCEDURE — 3077F SYST BP >= 140 MM HG: CPT | Mod: CPTII,S$GLB,, | Performed by: INTERNAL MEDICINE

## 2021-09-07 PROCEDURE — 1101F PR PT FALLS ASSESS DOC 0-1 FALLS W/OUT INJ PAST YR: ICD-10-PCS | Mod: CPTII,S$GLB,, | Performed by: INTERNAL MEDICINE

## 2021-09-07 PROCEDURE — 3044F PR MOST RECENT HEMOGLOBIN A1C LEVEL <7.0%: ICD-10-PCS | Mod: CPTII,S$GLB,, | Performed by: INTERNAL MEDICINE

## 2021-09-07 PROCEDURE — 99999 PR PBB SHADOW E&M-EST. PATIENT-LVL IV: CPT | Mod: PBBFAC,,, | Performed by: INTERNAL MEDICINE

## 2021-09-07 PROCEDURE — 93005 ELECTROCARDIOGRAM TRACING: CPT | Mod: S$GLB,,, | Performed by: INTERNAL MEDICINE

## 2021-09-07 PROCEDURE — 99999 PR PBB SHADOW E&M-EST. PATIENT-LVL IV: ICD-10-PCS | Mod: PBBFAC,,, | Performed by: INTERNAL MEDICINE

## 2021-09-07 PROCEDURE — 3061F PR NEG MICROALBUMINURIA RESULT DOCUMENTED/REVIEW: ICD-10-PCS | Mod: CPTII,S$GLB,, | Performed by: INTERNAL MEDICINE

## 2021-09-07 PROCEDURE — 3288F PR FALLS RISK ASSESSMENT DOCUMENTED: ICD-10-PCS | Mod: CPTII,S$GLB,, | Performed by: INTERNAL MEDICINE

## 2021-09-07 PROCEDURE — 1126F AMNT PAIN NOTED NONE PRSNT: CPT | Mod: CPTII,S$GLB,, | Performed by: INTERNAL MEDICINE

## 2021-09-07 PROCEDURE — 3077F PR MOST RECENT SYSTOLIC BLOOD PRESSURE >= 140 MM HG: ICD-10-PCS | Mod: CPTII,S$GLB,, | Performed by: INTERNAL MEDICINE

## 2021-09-07 PROCEDURE — 93010 ELECTROCARDIOGRAM REPORT: CPT | Mod: S$GLB,,, | Performed by: INTERNAL MEDICINE

## 2021-09-07 PROCEDURE — 93005 RHYTHM STRIP: ICD-10-PCS | Mod: S$GLB,,, | Performed by: INTERNAL MEDICINE

## 2021-09-07 PROCEDURE — 99214 PR OFFICE/OUTPT VISIT, EST, LEVL IV, 30-39 MIN: ICD-10-PCS | Mod: S$GLB,,, | Performed by: INTERNAL MEDICINE

## 2021-09-07 PROCEDURE — 3008F BODY MASS INDEX DOCD: CPT | Mod: CPTII,S$GLB,, | Performed by: INTERNAL MEDICINE

## 2021-09-07 PROCEDURE — 1126F PR PAIN SEVERITY QUANTIFIED, NO PAIN PRESENT: ICD-10-PCS | Mod: CPTII,S$GLB,, | Performed by: INTERNAL MEDICINE

## 2021-09-07 PROCEDURE — 1101F PT FALLS ASSESS-DOCD LE1/YR: CPT | Mod: CPTII,S$GLB,, | Performed by: INTERNAL MEDICINE

## 2021-09-07 PROCEDURE — 93010 RHYTHM STRIP: ICD-10-PCS | Mod: S$GLB,,, | Performed by: INTERNAL MEDICINE

## 2021-09-07 PROCEDURE — 1160F RVW MEDS BY RX/DR IN RCRD: CPT | Mod: CPTII,S$GLB,, | Performed by: INTERNAL MEDICINE

## 2021-09-07 PROCEDURE — 1159F PR MEDICATION LIST DOCUMENTED IN MEDICAL RECORD: ICD-10-PCS | Mod: CPTII,S$GLB,, | Performed by: INTERNAL MEDICINE

## 2021-09-07 PROCEDURE — 99499 RISK ADDL DX/OHS AUDIT: ICD-10-PCS | Mod: HCNC,S$GLB,, | Performed by: INTERNAL MEDICINE

## 2021-09-07 PROCEDURE — 99214 OFFICE O/P EST MOD 30 MIN: CPT | Mod: S$GLB,,, | Performed by: INTERNAL MEDICINE

## 2021-09-07 PROCEDURE — 3066F PR DOCUMENTATION OF TREATMENT FOR NEPHROPATHY: ICD-10-PCS | Mod: CPTII,S$GLB,, | Performed by: INTERNAL MEDICINE

## 2021-09-07 PROCEDURE — 1159F MED LIST DOCD IN RCRD: CPT | Mod: CPTII,S$GLB,, | Performed by: INTERNAL MEDICINE

## 2021-09-07 PROCEDURE — 99499 UNLISTED E&M SERVICE: CPT | Mod: HCNC,S$GLB,, | Performed by: INTERNAL MEDICINE

## 2021-09-07 PROCEDURE — 3078F PR MOST RECENT DIASTOLIC BLOOD PRESSURE < 80 MM HG: ICD-10-PCS | Mod: CPTII,S$GLB,, | Performed by: INTERNAL MEDICINE

## 2021-09-07 PROCEDURE — 3008F PR BODY MASS INDEX (BMI) DOCUMENTED: ICD-10-PCS | Mod: CPTII,S$GLB,, | Performed by: INTERNAL MEDICINE

## 2021-09-07 PROCEDURE — 3288F FALL RISK ASSESSMENT DOCD: CPT | Mod: CPTII,S$GLB,, | Performed by: INTERNAL MEDICINE

## 2021-09-07 PROCEDURE — 3061F NEG MICROALBUMINURIA REV: CPT | Mod: CPTII,S$GLB,, | Performed by: INTERNAL MEDICINE

## 2021-09-07 PROCEDURE — 3078F DIAST BP <80 MM HG: CPT | Mod: CPTII,S$GLB,, | Performed by: INTERNAL MEDICINE

## 2021-09-07 PROCEDURE — 3044F HG A1C LEVEL LT 7.0%: CPT | Mod: CPTII,S$GLB,, | Performed by: INTERNAL MEDICINE

## 2021-09-07 PROCEDURE — 1160F PR REVIEW ALL MEDS BY PRESCRIBER/CLIN PHARMACIST DOCUMENTED: ICD-10-PCS | Mod: CPTII,S$GLB,, | Performed by: INTERNAL MEDICINE

## 2021-09-22 ENCOUNTER — PATIENT MESSAGE (OUTPATIENT)
Dept: ELECTROPHYSIOLOGY | Facility: CLINIC | Age: 71
End: 2021-09-22

## 2021-09-22 ENCOUNTER — OFFICE VISIT (OUTPATIENT)
Dept: URGENT CARE | Facility: CLINIC | Age: 71
End: 2021-09-22
Payer: MEDICARE

## 2021-09-22 VITALS
HEIGHT: 64 IN | DIASTOLIC BLOOD PRESSURE: 63 MMHG | WEIGHT: 195 LBS | SYSTOLIC BLOOD PRESSURE: 144 MMHG | TEMPERATURE: 98 F | HEART RATE: 65 BPM | OXYGEN SATURATION: 97 % | RESPIRATION RATE: 17 BRPM | BODY MASS INDEX: 33.29 KG/M2

## 2021-09-22 DIAGNOSIS — N30.01 ACUTE CYSTITIS WITH HEMATURIA: ICD-10-CM

## 2021-09-22 DIAGNOSIS — I10 ESSENTIAL HYPERTENSION: ICD-10-CM

## 2021-09-22 DIAGNOSIS — R30.0 DYSURIA: Primary | ICD-10-CM

## 2021-09-22 LAB
BILIRUB UR QL STRIP: NEGATIVE
GLUCOSE UR QL STRIP: NEGATIVE
KETONES UR QL STRIP: NEGATIVE
LEUKOCYTE ESTERASE UR QL STRIP: NEGATIVE
PH, POC UA: 6.5 (ref 5–8)
POC BLOOD, URINE: NEGATIVE
POC NITRATES, URINE: NEGATIVE
PROT UR QL STRIP: NEGATIVE
SP GR UR STRIP: 1.02 (ref 1–1.03)
UROBILINOGEN UR STRIP-ACNC: NORMAL (ref 0.1–1.1)

## 2021-09-22 PROCEDURE — 3066F NEPHROPATHY DOC TX: CPT | Mod: CPTII,S$GLB,, | Performed by: FAMILY MEDICINE

## 2021-09-22 PROCEDURE — 3061F PR NEG MICROALBUMINURIA RESULT DOCUMENTED/REVIEW: ICD-10-PCS | Mod: CPTII,S$GLB,, | Performed by: FAMILY MEDICINE

## 2021-09-22 PROCEDURE — 81003 URINALYSIS AUTO W/O SCOPE: CPT | Mod: QW,S$GLB,, | Performed by: FAMILY MEDICINE

## 2021-09-22 PROCEDURE — 99214 PR OFFICE/OUTPT VISIT, EST, LEVL IV, 30-39 MIN: ICD-10-PCS | Mod: S$GLB,,, | Performed by: FAMILY MEDICINE

## 2021-09-22 PROCEDURE — 87086 URINE CULTURE/COLONY COUNT: CPT | Performed by: FAMILY MEDICINE

## 2021-09-22 PROCEDURE — 3077F SYST BP >= 140 MM HG: CPT | Mod: CPTII,S$GLB,, | Performed by: FAMILY MEDICINE

## 2021-09-22 PROCEDURE — 3078F DIAST BP <80 MM HG: CPT | Mod: CPTII,S$GLB,, | Performed by: FAMILY MEDICINE

## 2021-09-22 PROCEDURE — 3066F PR DOCUMENTATION OF TREATMENT FOR NEPHROPATHY: ICD-10-PCS | Mod: CPTII,S$GLB,, | Performed by: FAMILY MEDICINE

## 2021-09-22 PROCEDURE — 3061F NEG MICROALBUMINURIA REV: CPT | Mod: CPTII,S$GLB,, | Performed by: FAMILY MEDICINE

## 2021-09-22 PROCEDURE — 3008F BODY MASS INDEX DOCD: CPT | Mod: CPTII,S$GLB,, | Performed by: FAMILY MEDICINE

## 2021-09-22 PROCEDURE — 3044F HG A1C LEVEL LT 7.0%: CPT | Mod: CPTII,S$GLB,, | Performed by: FAMILY MEDICINE

## 2021-09-22 PROCEDURE — 1159F MED LIST DOCD IN RCRD: CPT | Mod: CPTII,S$GLB,, | Performed by: FAMILY MEDICINE

## 2021-09-22 PROCEDURE — 81003 POCT URINALYSIS, DIPSTICK, AUTOMATED, W/O SCOPE: ICD-10-PCS | Mod: QW,S$GLB,, | Performed by: FAMILY MEDICINE

## 2021-09-22 PROCEDURE — 3044F PR MOST RECENT HEMOGLOBIN A1C LEVEL <7.0%: ICD-10-PCS | Mod: CPTII,S$GLB,, | Performed by: FAMILY MEDICINE

## 2021-09-22 PROCEDURE — 1159F PR MEDICATION LIST DOCUMENTED IN MEDICAL RECORD: ICD-10-PCS | Mod: CPTII,S$GLB,, | Performed by: FAMILY MEDICINE

## 2021-09-22 PROCEDURE — 3077F PR MOST RECENT SYSTOLIC BLOOD PRESSURE >= 140 MM HG: ICD-10-PCS | Mod: CPTII,S$GLB,, | Performed by: FAMILY MEDICINE

## 2021-09-22 PROCEDURE — 99214 OFFICE O/P EST MOD 30 MIN: CPT | Mod: S$GLB,,, | Performed by: FAMILY MEDICINE

## 2021-09-22 PROCEDURE — 3078F PR MOST RECENT DIASTOLIC BLOOD PRESSURE < 80 MM HG: ICD-10-PCS | Mod: CPTII,S$GLB,, | Performed by: FAMILY MEDICINE

## 2021-09-22 PROCEDURE — 3008F PR BODY MASS INDEX (BMI) DOCUMENTED: ICD-10-PCS | Mod: CPTII,S$GLB,, | Performed by: FAMILY MEDICINE

## 2021-09-22 RX ORDER — HYDROCHLOROTHIAZIDE 25 MG/1
25 TABLET ORAL DAILY
Qty: 90 TABLET | Refills: 0 | Status: SHIPPED | OUTPATIENT
Start: 2021-09-22 | End: 2021-10-15 | Stop reason: SDUPTHER

## 2021-09-22 RX ORDER — AMOXICILLIN AND CLAVULANATE POTASSIUM 875; 125 MG/1; MG/1
1 TABLET, FILM COATED ORAL 2 TIMES DAILY
Qty: 14 TABLET | Refills: 0 | Status: SHIPPED | OUTPATIENT
Start: 2021-09-22 | End: 2021-09-29

## 2021-09-24 LAB — BACTERIA UR CULT: NO GROWTH

## 2021-09-26 ENCOUNTER — CLINICAL SUPPORT (OUTPATIENT)
Dept: CARDIOLOGY | Facility: HOSPITAL | Age: 71
End: 2021-09-26
Payer: MEDICARE

## 2021-09-26 DIAGNOSIS — Z95.818 PRESENCE OF OTHER CARDIAC IMPLANTS AND GRAFTS: ICD-10-CM

## 2021-09-26 PROCEDURE — G2066 INTER DEVC REMOTE 30D: HCPCS | Performed by: INTERNAL MEDICINE

## 2021-09-26 PROCEDURE — 93298 CARDIAC DEVICE CHECK - REMOTE: ICD-10-PCS | Mod: ,,, | Performed by: INTERNAL MEDICINE

## 2021-09-26 PROCEDURE — 93298 REM INTERROG DEV EVAL SCRMS: CPT | Mod: ,,, | Performed by: INTERNAL MEDICINE

## 2021-09-29 ENCOUNTER — TELEPHONE (OUTPATIENT)
Dept: INTERNAL MEDICINE | Facility: CLINIC | Age: 71
End: 2021-09-29
Payer: MEDICARE

## 2021-10-04 ENCOUNTER — PATIENT MESSAGE (OUTPATIENT)
Dept: ADMINISTRATIVE | Facility: HOSPITAL | Age: 71
End: 2021-10-04

## 2021-10-14 ENCOUNTER — PATIENT OUTREACH (OUTPATIENT)
Dept: ADMINISTRATIVE | Facility: OTHER | Age: 71
End: 2021-10-14

## 2021-10-15 DIAGNOSIS — I10 ESSENTIAL HYPERTENSION: ICD-10-CM

## 2021-10-15 RX ORDER — HYDROCHLOROTHIAZIDE 25 MG/1
25 TABLET ORAL DAILY
Qty: 90 TABLET | Refills: 3 | Status: SHIPPED | OUTPATIENT
Start: 2021-10-15 | End: 2022-09-13

## 2021-10-18 ENCOUNTER — OFFICE VISIT (OUTPATIENT)
Dept: OPHTHALMOLOGY | Facility: CLINIC | Age: 71
End: 2021-10-18
Payer: MEDICARE

## 2021-10-18 DIAGNOSIS — H40.1131 PRIMARY OPEN ANGLE GLAUCOMA (POAG) OF BOTH EYES, MILD STAGE: Primary | ICD-10-CM

## 2021-10-18 DIAGNOSIS — H25.13 CATARACT, NUCLEAR SCLEROTIC, BOTH EYES: ICD-10-CM

## 2021-10-18 DIAGNOSIS — E11.9 TYPE 2 DIABETES MELLITUS WITHOUT OPHTHALMIC MANIFESTATIONS: ICD-10-CM

## 2021-10-18 PROCEDURE — 92020 GONIOSCOPY: CPT | Mod: PBBFAC | Performed by: OPHTHALMOLOGY

## 2021-10-18 PROCEDURE — 92250 COLOR FUNDUS PHOTOGRAPHY - OU - BOTH EYES: ICD-10-PCS | Mod: HCNC,S$GLB,, | Performed by: OPHTHALMOLOGY

## 2021-10-18 PROCEDURE — 92020 PR SPECIAL EYE EVAL,GONIOSCOPY: ICD-10-PCS | Mod: HCNC,S$GLB,, | Performed by: OPHTHALMOLOGY

## 2021-10-18 PROCEDURE — 92020 GONIOSCOPY: CPT | Mod: HCNC,S$GLB,, | Performed by: OPHTHALMOLOGY

## 2021-10-18 PROCEDURE — 92004 COMPRE OPH EXAM NEW PT 1/>: CPT | Mod: HCNC,S$GLB,, | Performed by: OPHTHALMOLOGY

## 2021-10-18 PROCEDURE — 99999 PR PBB SHADOW E&M-EST. PATIENT-LVL III: CPT | Mod: PBBFAC,HCNC,, | Performed by: OPHTHALMOLOGY

## 2021-10-18 PROCEDURE — 99213 OFFICE O/P EST LOW 20 MIN: CPT | Mod: PBBFAC | Performed by: OPHTHALMOLOGY

## 2021-10-18 PROCEDURE — 99999 PR PBB SHADOW E&M-EST. PATIENT-LVL III: ICD-10-PCS | Mod: PBBFAC,HCNC,, | Performed by: OPHTHALMOLOGY

## 2021-10-18 PROCEDURE — 99499 UNLISTED E&M SERVICE: CPT | Mod: S$GLB,,, | Performed by: OPHTHALMOLOGY

## 2021-10-18 PROCEDURE — 92250 FUNDUS PHOTOGRAPHY W/I&R: CPT | Mod: PBBFAC | Performed by: OPHTHALMOLOGY

## 2021-10-18 PROCEDURE — 99499 RISK ADDL DX/OHS AUDIT: ICD-10-PCS | Mod: S$GLB,,, | Performed by: OPHTHALMOLOGY

## 2021-10-18 PROCEDURE — 92004 PR EYE EXAM, NEW PATIENT,COMPREHESV: ICD-10-PCS | Mod: HCNC,S$GLB,, | Performed by: OPHTHALMOLOGY

## 2021-10-18 RX ORDER — TRAVOPROST 0.04 MG/ML
1 SOLUTION/ DROPS OPHTHALMIC NIGHTLY
Qty: 7.5 ML | Refills: 3 | Status: SHIPPED | OUTPATIENT
Start: 2021-10-18 | End: 2022-02-14 | Stop reason: SDUPTHER

## 2021-10-18 RX ORDER — DORZOLAMIDE HCL 20 MG/ML
1 SOLUTION/ DROPS OPHTHALMIC 3 TIMES DAILY
Qty: 30 ML | Refills: 3 | Status: SHIPPED | OUTPATIENT
Start: 2021-10-18 | End: 2022-02-14 | Stop reason: SDUPTHER

## 2021-10-26 ENCOUNTER — CLINICAL SUPPORT (OUTPATIENT)
Dept: CARDIOLOGY | Facility: HOSPITAL | Age: 71
End: 2021-10-26
Payer: MEDICARE

## 2021-10-26 DIAGNOSIS — Z95.818 PRESENCE OF OTHER CARDIAC IMPLANTS AND GRAFTS: ICD-10-CM

## 2021-10-26 PROCEDURE — 93298 CARDIAC DEVICE CHECK - REMOTE: ICD-10-PCS | Mod: HCNC,,, | Performed by: INTERNAL MEDICINE

## 2021-10-26 PROCEDURE — 93298 REM INTERROG DEV EVAL SCRMS: CPT | Mod: HCNC,,, | Performed by: INTERNAL MEDICINE

## 2021-10-26 PROCEDURE — G2066 INTER DEVC REMOTE 30D: HCPCS | Mod: HCNC | Performed by: INTERNAL MEDICINE

## 2021-10-28 ENCOUNTER — PATIENT MESSAGE (OUTPATIENT)
Dept: OPHTHALMOLOGY | Facility: CLINIC | Age: 71
End: 2021-10-28
Payer: MEDICARE

## 2021-10-28 ENCOUNTER — PATIENT MESSAGE (OUTPATIENT)
Dept: ELECTROPHYSIOLOGY | Facility: CLINIC | Age: 71
End: 2021-10-28
Payer: MEDICARE

## 2021-10-29 RX ORDER — VERAPAMIL HYDROCHLORIDE 180 MG/1
180 TABLET, FILM COATED, EXTENDED RELEASE ORAL NIGHTLY
Qty: 90 TABLET | Refills: 3 | Status: SHIPPED | OUTPATIENT
Start: 2021-10-29 | End: 2022-09-12 | Stop reason: SDUPTHER

## 2021-11-15 ENCOUNTER — LAB VISIT (OUTPATIENT)
Dept: LAB | Facility: OTHER | Age: 71
End: 2021-11-15
Attending: INTERNAL MEDICINE
Payer: MEDICARE

## 2021-11-15 DIAGNOSIS — E11.9 CONTROLLED TYPE 2 DIABETES MELLITUS WITHOUT COMPLICATION, WITHOUT LONG-TERM CURRENT USE OF INSULIN: ICD-10-CM

## 2021-11-15 LAB
ALBUMIN/CREAT UR: 5.7 UG/MG (ref 0–30)
CREAT UR-MCNC: 157.5 MG/DL (ref 15–325)
MICROALBUMIN UR DL<=1MG/L-MCNC: 9 UG/ML

## 2021-11-15 PROCEDURE — 82570 ASSAY OF URINE CREATININE: CPT | Mod: HCNC | Performed by: INTERNAL MEDICINE

## 2021-11-18 ENCOUNTER — OFFICE VISIT (OUTPATIENT)
Dept: INTERNAL MEDICINE | Facility: CLINIC | Age: 71
End: 2021-11-18
Payer: MEDICARE

## 2021-11-18 VITALS
OXYGEN SATURATION: 98 % | HEART RATE: 72 BPM | WEIGHT: 197.31 LBS | DIASTOLIC BLOOD PRESSURE: 60 MMHG | HEIGHT: 64 IN | BODY MASS INDEX: 33.69 KG/M2 | SYSTOLIC BLOOD PRESSURE: 130 MMHG

## 2021-11-18 DIAGNOSIS — E66.9 OBESITY (BMI 30.0-34.9): ICD-10-CM

## 2021-11-18 DIAGNOSIS — Z91.89 CANDIDATE FOR STATIN THERAPY DUE TO RISK OF FUTURE CARDIOVASCULAR EVENT: ICD-10-CM

## 2021-11-18 DIAGNOSIS — Z23 INFLUENZA VACCINE NEEDED: ICD-10-CM

## 2021-11-18 DIAGNOSIS — E11.9 CONTROLLED TYPE 2 DIABETES MELLITUS WITHOUT COMPLICATION, WITHOUT LONG-TERM CURRENT USE OF INSULIN: ICD-10-CM

## 2021-11-18 DIAGNOSIS — I10 ESSENTIAL HYPERTENSION: ICD-10-CM

## 2021-11-18 DIAGNOSIS — Z23 NEED FOR ZOSTER VACCINE: ICD-10-CM

## 2021-11-18 DIAGNOSIS — R06.83 SNORING: ICD-10-CM

## 2021-11-18 DIAGNOSIS — Z00.00 ANNUAL PHYSICAL EXAM: Primary | ICD-10-CM

## 2021-11-18 PROCEDURE — 1101F PT FALLS ASSESS-DOCD LE1/YR: CPT | Mod: HCNC,CPTII,S$GLB, | Performed by: INTERNAL MEDICINE

## 2021-11-18 PROCEDURE — 1101F PR PT FALLS ASSESS DOC 0-1 FALLS W/OUT INJ PAST YR: ICD-10-PCS | Mod: HCNC,CPTII,S$GLB, | Performed by: INTERNAL MEDICINE

## 2021-11-18 PROCEDURE — 3066F NEPHROPATHY DOC TX: CPT | Mod: HCNC,CPTII,S$GLB, | Performed by: INTERNAL MEDICINE

## 2021-11-18 PROCEDURE — 3008F BODY MASS INDEX DOCD: CPT | Mod: HCNC,CPTII,S$GLB, | Performed by: INTERNAL MEDICINE

## 2021-11-18 PROCEDURE — 1159F MED LIST DOCD IN RCRD: CPT | Mod: HCNC,CPTII,S$GLB, | Performed by: INTERNAL MEDICINE

## 2021-11-18 PROCEDURE — 3066F PR DOCUMENTATION OF TREATMENT FOR NEPHROPATHY: ICD-10-PCS | Mod: HCNC,CPTII,S$GLB, | Performed by: INTERNAL MEDICINE

## 2021-11-18 PROCEDURE — 3288F PR FALLS RISK ASSESSMENT DOCUMENTED: ICD-10-PCS | Mod: HCNC,CPTII,S$GLB, | Performed by: INTERNAL MEDICINE

## 2021-11-18 PROCEDURE — 3075F PR MOST RECENT SYSTOLIC BLOOD PRESS GE 130-139MM HG: ICD-10-PCS | Mod: HCNC,CPTII,S$GLB, | Performed by: INTERNAL MEDICINE

## 2021-11-18 PROCEDURE — 99999 PR PBB SHADOW E&M-EST. PATIENT-LVL IV: ICD-10-PCS | Mod: PBBFAC,HCNC,, | Performed by: INTERNAL MEDICINE

## 2021-11-18 PROCEDURE — 3061F NEG MICROALBUMINURIA REV: CPT | Mod: HCNC,CPTII,S$GLB, | Performed by: INTERNAL MEDICINE

## 2021-11-18 PROCEDURE — 99999 PR PBB SHADOW E&M-EST. PATIENT-LVL IV: CPT | Mod: PBBFAC,HCNC,, | Performed by: INTERNAL MEDICINE

## 2021-11-18 PROCEDURE — 3075F SYST BP GE 130 - 139MM HG: CPT | Mod: HCNC,CPTII,S$GLB, | Performed by: INTERNAL MEDICINE

## 2021-11-18 PROCEDURE — 1160F PR REVIEW ALL MEDS BY PRESCRIBER/CLIN PHARMACIST DOCUMENTED: ICD-10-PCS | Mod: HCNC,CPTII,S$GLB, | Performed by: INTERNAL MEDICINE

## 2021-11-18 PROCEDURE — 3008F PR BODY MASS INDEX (BMI) DOCUMENTED: ICD-10-PCS | Mod: HCNC,CPTII,S$GLB, | Performed by: INTERNAL MEDICINE

## 2021-11-18 PROCEDURE — 3288F FALL RISK ASSESSMENT DOCD: CPT | Mod: HCNC,CPTII,S$GLB, | Performed by: INTERNAL MEDICINE

## 2021-11-18 PROCEDURE — 3078F DIAST BP <80 MM HG: CPT | Mod: HCNC,CPTII,S$GLB, | Performed by: INTERNAL MEDICINE

## 2021-11-18 PROCEDURE — 3078F PR MOST RECENT DIASTOLIC BLOOD PRESSURE < 80 MM HG: ICD-10-PCS | Mod: HCNC,CPTII,S$GLB, | Performed by: INTERNAL MEDICINE

## 2021-11-18 PROCEDURE — 3044F PR MOST RECENT HEMOGLOBIN A1C LEVEL <7.0%: ICD-10-PCS | Mod: HCNC,CPTII,S$GLB, | Performed by: INTERNAL MEDICINE

## 2021-11-18 PROCEDURE — 1159F PR MEDICATION LIST DOCUMENTED IN MEDICAL RECORD: ICD-10-PCS | Mod: HCNC,CPTII,S$GLB, | Performed by: INTERNAL MEDICINE

## 2021-11-18 PROCEDURE — 99397 PR PREVENTIVE VISIT,EST,65 & OVER: ICD-10-PCS | Mod: HCNC,S$GLB,, | Performed by: INTERNAL MEDICINE

## 2021-11-18 PROCEDURE — 99397 PER PM REEVAL EST PAT 65+ YR: CPT | Mod: HCNC,S$GLB,, | Performed by: INTERNAL MEDICINE

## 2021-11-18 PROCEDURE — 3044F HG A1C LEVEL LT 7.0%: CPT | Mod: HCNC,CPTII,S$GLB, | Performed by: INTERNAL MEDICINE

## 2021-11-18 PROCEDURE — 1160F RVW MEDS BY RX/DR IN RCRD: CPT | Mod: HCNC,CPTII,S$GLB, | Performed by: INTERNAL MEDICINE

## 2021-11-18 PROCEDURE — 3061F PR NEG MICROALBUMINURIA RESULT DOCUMENTED/REVIEW: ICD-10-PCS | Mod: HCNC,CPTII,S$GLB, | Performed by: INTERNAL MEDICINE

## 2021-11-18 PROCEDURE — 1126F AMNT PAIN NOTED NONE PRSNT: CPT | Mod: HCNC,CPTII,S$GLB, | Performed by: INTERNAL MEDICINE

## 2021-11-18 PROCEDURE — 1126F PR PAIN SEVERITY QUANTIFIED, NO PAIN PRESENT: ICD-10-PCS | Mod: HCNC,CPTII,S$GLB, | Performed by: INTERNAL MEDICINE

## 2021-11-22 ENCOUNTER — PATIENT MESSAGE (OUTPATIENT)
Dept: INTERNAL MEDICINE | Facility: CLINIC | Age: 71
End: 2021-11-22
Payer: MEDICARE

## 2021-11-22 DIAGNOSIS — I10 ESSENTIAL HYPERTENSION: Primary | ICD-10-CM

## 2021-11-22 DIAGNOSIS — I10 ESSENTIAL HYPERTENSION: ICD-10-CM

## 2021-11-22 RX ORDER — LOSARTAN POTASSIUM 50 MG/1
50 TABLET ORAL DAILY
Qty: 30 TABLET | Refills: 5 | Status: SHIPPED | OUTPATIENT
Start: 2021-11-22 | End: 2021-12-27

## 2021-11-24 ENCOUNTER — OFFICE VISIT (OUTPATIENT)
Dept: PODIATRY | Facility: CLINIC | Age: 71
End: 2021-11-24
Payer: MEDICARE

## 2021-11-24 VITALS
WEIGHT: 197 LBS | SYSTOLIC BLOOD PRESSURE: 175 MMHG | HEART RATE: 73 BPM | HEIGHT: 64 IN | DIASTOLIC BLOOD PRESSURE: 70 MMHG | BODY MASS INDEX: 33.63 KG/M2

## 2021-11-24 DIAGNOSIS — M20.41 HAMMER TOES OF BOTH FEET: ICD-10-CM

## 2021-11-24 DIAGNOSIS — E11.9 CONTROLLED TYPE 2 DIABETES MELLITUS WITHOUT COMPLICATION, WITHOUT LONG-TERM CURRENT USE OF INSULIN: Primary | ICD-10-CM

## 2021-11-24 DIAGNOSIS — E11.42 TYPE 2 DIABETES MELLITUS WITH DIABETIC POLYNEUROPATHY, UNSPECIFIED WHETHER LONG TERM INSULIN USE: ICD-10-CM

## 2021-11-24 DIAGNOSIS — M20.42 HAMMER TOES OF BOTH FEET: ICD-10-CM

## 2021-11-24 DIAGNOSIS — M20.11 HALLUX VALGUS OF RIGHT FOOT: ICD-10-CM

## 2021-11-24 PROCEDURE — 99999 PR PBB SHADOW E&M-EST. PATIENT-LVL IV: ICD-10-PCS | Mod: PBBFAC,HCNC,, | Performed by: PODIATRIST

## 2021-11-24 PROCEDURE — 3066F NEPHROPATHY DOC TX: CPT | Mod: HCNC,CPTII,S$GLB, | Performed by: PODIATRIST

## 2021-11-24 PROCEDURE — 99212 OFFICE O/P EST SF 10 MIN: CPT | Mod: HCNC,S$GLB,, | Performed by: PODIATRIST

## 2021-11-24 PROCEDURE — 3066F PR DOCUMENTATION OF TREATMENT FOR NEPHROPATHY: ICD-10-PCS | Mod: HCNC,CPTII,S$GLB, | Performed by: PODIATRIST

## 2021-11-24 PROCEDURE — 99999 PR PBB SHADOW E&M-EST. PATIENT-LVL IV: CPT | Mod: PBBFAC,HCNC,, | Performed by: PODIATRIST

## 2021-11-24 PROCEDURE — 99212 PR OFFICE/OUTPT VISIT, EST, LEVL II, 10-19 MIN: ICD-10-PCS | Mod: HCNC,S$GLB,, | Performed by: PODIATRIST

## 2021-11-24 PROCEDURE — 4010F ACE/ARB THERAPY RXD/TAKEN: CPT | Mod: HCNC,CPTII,S$GLB, | Performed by: PODIATRIST

## 2021-11-24 PROCEDURE — 3061F PR NEG MICROALBUMINURIA RESULT DOCUMENTED/REVIEW: ICD-10-PCS | Mod: HCNC,CPTII,S$GLB, | Performed by: PODIATRIST

## 2021-11-24 PROCEDURE — 4010F PR ACE/ARB THEARPY RXD/TAKEN: ICD-10-PCS | Mod: HCNC,CPTII,S$GLB, | Performed by: PODIATRIST

## 2021-11-24 PROCEDURE — 3061F NEG MICROALBUMINURIA REV: CPT | Mod: HCNC,CPTII,S$GLB, | Performed by: PODIATRIST

## 2021-11-25 ENCOUNTER — CLINICAL SUPPORT (OUTPATIENT)
Dept: CARDIOLOGY | Facility: HOSPITAL | Age: 71
End: 2021-11-25
Payer: MEDICARE

## 2021-11-25 DIAGNOSIS — Z95.818 PRESENCE OF OTHER CARDIAC IMPLANTS AND GRAFTS: ICD-10-CM

## 2021-11-25 PROCEDURE — 93298 REM INTERROG DEV EVAL SCRMS: CPT | Mod: HCNC,,, | Performed by: INTERNAL MEDICINE

## 2021-11-25 PROCEDURE — 93298 CARDIAC DEVICE CHECK - REMOTE: ICD-10-PCS | Mod: HCNC,,, | Performed by: INTERNAL MEDICINE

## 2021-11-25 PROCEDURE — G2066 INTER DEVC REMOTE 30D: HCPCS | Mod: HCNC | Performed by: INTERNAL MEDICINE

## 2021-11-27 ENCOUNTER — PATIENT MESSAGE (OUTPATIENT)
Dept: INTERNAL MEDICINE | Facility: CLINIC | Age: 71
End: 2021-11-27
Payer: MEDICARE

## 2021-12-06 ENCOUNTER — PATIENT OUTREACH (OUTPATIENT)
Dept: ADMINISTRATIVE | Facility: HOSPITAL | Age: 71
End: 2021-12-06
Payer: MEDICARE

## 2021-12-25 ENCOUNTER — CLINICAL SUPPORT (OUTPATIENT)
Dept: CARDIOLOGY | Facility: HOSPITAL | Age: 71
End: 2021-12-25
Payer: MEDICARE

## 2021-12-25 DIAGNOSIS — Z95.818 PRESENCE OF OTHER CARDIAC IMPLANTS AND GRAFTS: ICD-10-CM

## 2021-12-25 PROCEDURE — 93298 REM INTERROG DEV EVAL SCRMS: CPT | Mod: HCNC,,, | Performed by: INTERNAL MEDICINE

## 2021-12-25 PROCEDURE — 93298 CARDIAC DEVICE CHECK - REMOTE: ICD-10-PCS | Mod: HCNC,,, | Performed by: INTERNAL MEDICINE

## 2021-12-25 PROCEDURE — G2066 INTER DEVC REMOTE 30D: HCPCS | Mod: HCNC | Performed by: INTERNAL MEDICINE

## 2021-12-27 ENCOUNTER — PATIENT MESSAGE (OUTPATIENT)
Dept: INTERNAL MEDICINE | Facility: CLINIC | Age: 71
End: 2021-12-27
Payer: MEDICARE

## 2021-12-27 RX ORDER — LOSARTAN POTASSIUM 100 MG/1
100 TABLET ORAL DAILY
Qty: 90 TABLET | Refills: 0 | Status: SHIPPED | OUTPATIENT
Start: 2021-12-27 | End: 2022-03-25

## 2022-01-11 ENCOUNTER — PATIENT MESSAGE (OUTPATIENT)
Dept: INTERNAL MEDICINE | Facility: CLINIC | Age: 72
End: 2022-01-11
Payer: MEDICARE

## 2022-01-24 ENCOUNTER — CLINICAL SUPPORT (OUTPATIENT)
Dept: CARDIOLOGY | Facility: HOSPITAL | Age: 72
End: 2022-01-24
Attending: INTERNAL MEDICINE
Payer: MEDICARE

## 2022-01-24 DIAGNOSIS — Z95.818 PRESENCE OF OTHER CARDIAC IMPLANTS AND GRAFTS: ICD-10-CM

## 2022-01-24 PROCEDURE — 93298 REM INTERROG DEV EVAL SCRMS: CPT | Mod: HCNC,,, | Performed by: INTERNAL MEDICINE

## 2022-01-24 PROCEDURE — 93298 CARDIAC DEVICE CHECK - REMOTE: ICD-10-PCS | Mod: HCNC,,, | Performed by: INTERNAL MEDICINE

## 2022-01-24 PROCEDURE — G2066 INTER DEVC REMOTE 30D: HCPCS | Mod: HCNC | Performed by: INTERNAL MEDICINE

## 2022-01-25 ENCOUNTER — PATIENT MESSAGE (OUTPATIENT)
Dept: INTERNAL MEDICINE | Facility: CLINIC | Age: 72
End: 2022-01-25
Payer: MEDICARE

## 2022-01-30 ENCOUNTER — PATIENT MESSAGE (OUTPATIENT)
Dept: INTERNAL MEDICINE | Facility: CLINIC | Age: 72
End: 2022-01-30
Payer: MEDICARE

## 2022-02-02 ENCOUNTER — PATIENT OUTREACH (OUTPATIENT)
Dept: ADMINISTRATIVE | Facility: HOSPITAL | Age: 72
End: 2022-02-02
Payer: MEDICARE

## 2022-02-13 NOTE — PROGRESS NOTES
HPI     Glaucoma     Comments: HVf and OCT review today               Comments     DLS: 10/18/21    1. POAG OU  2. NS OU  3. Type 2 DM no DR    MEDS:  Dorzolamide TID OU  Travatan Z QHS OU          Last edited by Chhaya Chester MA on 2022  9:56 AM. (History)              Assessment /Plan     For exam results, see Encounter Report.    Primary open angle glaucoma (POAG) of both eyes, mild stage    Cataract, nuclear sclerotic, both eyes    Type 2 diabetes mellitus without ophthalmic manifestations      1. Patient followed by Dr. Balwinder Gregg for many years. - post Mary   Pre-Mary - was seeing dr Nadine Avila   Seen by Dr. Kebede in . Seen by Dr. Hensley (retina)  in .        Glaucoma (type and duration)    Primary open angle glaucoma OU. Mild.    First HVF    (outside records - fullish)    First photos   10/2021    Treatment / Drops started   About            Family history    Mother  since . H/o glaucoma surgery/drops.         Glaucoma meds    Dorzolamide TID OU. Travatan QHS OU.         H/O adverse rxn to glaucoma drops:   H/o bundle branch block. Taken off of beta blockers.         LASERS    None        GLAUCOMA SURGERIES    None        OTHER EYE SURGERIES    None        CDR    0.75/0.8        Tbase    ?? Off gtts           Tmax    - on gtts           Ttarget    18 ou - may need to be lower              HVF   - many old outside test - pt brought in - all near normal - Dr Balwinder Gregg     1 test  to   - SNS  od // ?SAD / INS  os        Gonio    +3 ou         CCT    505/ 507         OCT    1 test  to  - RNFL - dec G/TI, bord NS/NI od // bord TI  os        Disc photos    10/18/21 with Dr. Simons    - Ttoday      - Test done today    IOP // HVF / OCT     2, NS ou - mild monitor     3. Diabetes    No BDR     Plan:   Continue dorzolamide TID OU and Travatan QHS OU.  Avoid BB's - heart block   Monitor HVF's / and IOP and OCT going forward - to monitor for  progression   + SNS od // ? INS os       Pt records were given back to her - they have not been scanned into Epic - (10/18/2021)   But the important data has been - reviewed and abstracted

## 2022-02-14 ENCOUNTER — OFFICE VISIT (OUTPATIENT)
Dept: OPHTHALMOLOGY | Facility: CLINIC | Age: 72
End: 2022-02-14
Payer: MEDICARE

## 2022-02-14 ENCOUNTER — CLINICAL SUPPORT (OUTPATIENT)
Dept: OPHTHALMOLOGY | Facility: CLINIC | Age: 72
End: 2022-02-14
Payer: MEDICARE

## 2022-02-14 DIAGNOSIS — H25.13 CATARACT, NUCLEAR SCLEROTIC, BOTH EYES: ICD-10-CM

## 2022-02-14 DIAGNOSIS — E11.9 TYPE 2 DIABETES MELLITUS WITHOUT OPHTHALMIC MANIFESTATIONS: ICD-10-CM

## 2022-02-14 DIAGNOSIS — H40.1131 PRIMARY OPEN ANGLE GLAUCOMA (POAG) OF BOTH EYES, MILD STAGE: Primary | ICD-10-CM

## 2022-02-14 PROCEDURE — 92133 CPTRZD OPH DX IMG PST SGM ON: CPT | Mod: HCNC,S$GLB,, | Performed by: OPHTHALMOLOGY

## 2022-02-14 PROCEDURE — 1101F PR PT FALLS ASSESS DOC 0-1 FALLS W/OUT INJ PAST YR: ICD-10-PCS | Mod: HCNC,CPTII,S$GLB, | Performed by: OPHTHALMOLOGY

## 2022-02-14 PROCEDURE — 99499 RISK ADDL DX/OHS AUDIT: ICD-10-PCS | Mod: S$GLB,,, | Performed by: OPHTHALMOLOGY

## 2022-02-14 PROCEDURE — 1101F PT FALLS ASSESS-DOCD LE1/YR: CPT | Mod: HCNC,CPTII,S$GLB, | Performed by: OPHTHALMOLOGY

## 2022-02-14 PROCEDURE — 99999 PR PBB SHADOW E&M-EST. PATIENT-LVL III: ICD-10-PCS | Mod: PBBFAC,HCNC,, | Performed by: OPHTHALMOLOGY

## 2022-02-14 PROCEDURE — 1126F AMNT PAIN NOTED NONE PRSNT: CPT | Mod: HCNC,CPTII,S$GLB, | Performed by: OPHTHALMOLOGY

## 2022-02-14 PROCEDURE — 1159F PR MEDICATION LIST DOCUMENTED IN MEDICAL RECORD: ICD-10-PCS | Mod: HCNC,CPTII,S$GLB, | Performed by: OPHTHALMOLOGY

## 2022-02-14 PROCEDURE — 92083 HUMPHREY VISUAL FIELD - OU - BOTH EYES: ICD-10-PCS | Mod: HCNC,S$GLB,, | Performed by: OPHTHALMOLOGY

## 2022-02-14 PROCEDURE — 3288F PR FALLS RISK ASSESSMENT DOCUMENTED: ICD-10-PCS | Mod: HCNC,CPTII,S$GLB, | Performed by: OPHTHALMOLOGY

## 2022-02-14 PROCEDURE — 92133 POSTERIOR SEGMENT OCT OPTIC NERVE(OCULAR COHERENCE TOMOGRAPHY) - OU - BOTH EYES: ICD-10-PCS | Mod: HCNC,S$GLB,, | Performed by: OPHTHALMOLOGY

## 2022-02-14 PROCEDURE — 3288F FALL RISK ASSESSMENT DOCD: CPT | Mod: HCNC,CPTII,S$GLB, | Performed by: OPHTHALMOLOGY

## 2022-02-14 PROCEDURE — 99499 UNLISTED E&M SERVICE: CPT | Mod: S$GLB,,, | Performed by: OPHTHALMOLOGY

## 2022-02-14 PROCEDURE — 92014 COMPRE OPH EXAM EST PT 1/>: CPT | Mod: HCNC,S$GLB,, | Performed by: OPHTHALMOLOGY

## 2022-02-14 PROCEDURE — 92083 EXTENDED VISUAL FIELD XM: CPT | Mod: HCNC,S$GLB,, | Performed by: OPHTHALMOLOGY

## 2022-02-14 PROCEDURE — 1159F MED LIST DOCD IN RCRD: CPT | Mod: HCNC,CPTII,S$GLB, | Performed by: OPHTHALMOLOGY

## 2022-02-14 PROCEDURE — 92014 PR EYE EXAM, EST PATIENT,COMPREHESV: ICD-10-PCS | Mod: HCNC,S$GLB,, | Performed by: OPHTHALMOLOGY

## 2022-02-14 PROCEDURE — 1126F PR PAIN SEVERITY QUANTIFIED, NO PAIN PRESENT: ICD-10-PCS | Mod: HCNC,CPTII,S$GLB, | Performed by: OPHTHALMOLOGY

## 2022-02-14 PROCEDURE — 1160F RVW MEDS BY RX/DR IN RCRD: CPT | Mod: HCNC,CPTII,S$GLB, | Performed by: OPHTHALMOLOGY

## 2022-02-14 PROCEDURE — 99999 PR PBB SHADOW E&M-EST. PATIENT-LVL III: CPT | Mod: PBBFAC,HCNC,, | Performed by: OPHTHALMOLOGY

## 2022-02-14 PROCEDURE — 1160F PR REVIEW ALL MEDS BY PRESCRIBER/CLIN PHARMACIST DOCUMENTED: ICD-10-PCS | Mod: HCNC,CPTII,S$GLB, | Performed by: OPHTHALMOLOGY

## 2022-02-14 RX ORDER — DORZOLAMIDE HCL 20 MG/ML
1 SOLUTION/ DROPS OPHTHALMIC 3 TIMES DAILY
Qty: 30 ML | Refills: 3 | Status: SHIPPED | OUTPATIENT
Start: 2022-02-14 | End: 2023-02-27 | Stop reason: SDUPTHER

## 2022-02-14 RX ORDER — FESOTERODINE FUMARATE 4 MG/1
1 TABLET, FILM COATED, EXTENDED RELEASE ORAL DAILY
COMMUNITY
Start: 2021-12-22 | End: 2022-10-17 | Stop reason: ALTCHOICE

## 2022-02-14 RX ORDER — TRAVOPROST 0.04 MG/ML
1 SOLUTION/ DROPS OPHTHALMIC NIGHTLY
Qty: 7.5 ML | Refills: 3 | Status: SHIPPED | OUTPATIENT
Start: 2022-02-14 | End: 2022-06-16

## 2022-02-14 NOTE — PROGRESS NOTES
HVF done ou./rel/fix/coop. good ou./per patient pirate patch used for testing./chart checked for latex allergy./plano/od plano/os-sm

## 2022-02-23 ENCOUNTER — CLINICAL SUPPORT (OUTPATIENT)
Dept: CARDIOLOGY | Facility: HOSPITAL | Age: 72
End: 2022-02-23
Payer: MEDICARE

## 2022-02-23 DIAGNOSIS — Z95.818 PRESENCE OF OTHER CARDIAC IMPLANTS AND GRAFTS: ICD-10-CM

## 2022-02-23 PROCEDURE — G2066 INTER DEVC REMOTE 30D: HCPCS | Mod: HCNC | Performed by: INTERNAL MEDICINE

## 2022-03-25 ENCOUNTER — CLINICAL SUPPORT (OUTPATIENT)
Dept: CARDIOLOGY | Facility: HOSPITAL | Age: 72
End: 2022-03-25
Payer: MEDICARE

## 2022-03-25 DIAGNOSIS — Z95.818 PRESENCE OF OTHER CARDIAC IMPLANTS AND GRAFTS: ICD-10-CM

## 2022-03-25 DIAGNOSIS — I10 ESSENTIAL HYPERTENSION: ICD-10-CM

## 2022-03-25 PROCEDURE — 93298 CARDIAC DEVICE CHECK - REMOTE: ICD-10-PCS | Mod: ,,, | Performed by: INTERNAL MEDICINE

## 2022-03-25 PROCEDURE — G2066 INTER DEVC REMOTE 30D: HCPCS | Performed by: INTERNAL MEDICINE

## 2022-03-25 PROCEDURE — 93298 REM INTERROG DEV EVAL SCRMS: CPT | Mod: ,,, | Performed by: INTERNAL MEDICINE

## 2022-03-25 RX ORDER — LOSARTAN POTASSIUM 100 MG/1
TABLET ORAL
Qty: 90 TABLET | Refills: 0 | Status: SHIPPED | OUTPATIENT
Start: 2022-03-25 | End: 2022-04-04

## 2022-03-25 NOTE — TELEPHONE ENCOUNTER
Refilled.  her last blood pressure number in our records was high.  Please ask the patient what her home blood pressure numbers have been.  Please add it in the remote BP vitals and let me know. Thanks!

## 2022-03-25 NOTE — TELEPHONE ENCOUNTER
Patient informed of medication approval. Patient also states her last blood pressure reading was 150/73. Reading has been added to remote blood pressure reading. Routed to Dr. Mistry to inform of blood pressure reading.

## 2022-03-25 NOTE — TELEPHONE ENCOUNTER
This Rx Request does not qualify for assessment with the [facility]   Please review protocol details and the Care Due Message for extra clinical information    Reasons Rx Request may be deferred:  Labs/Vitals overdue  Labs/Vitals abnormal    Note composed:1:35 PM 03/25/2022

## 2022-03-25 NOTE — TELEPHONE ENCOUNTER
Care Due:                  Date            Visit Type   Department     Provider  --------------------------------------------------------------------------------                                EP -                              PRIMARY      [facility] INTERNAL  Last Visit: 11-      CARE ([facility])   ROLAND Mistry                              EP -                              PRIMARY      [facility] INTERNAL  Next Visit: 05-      CARE ([facility])   ROLAND Mistry                                                            Last  Test          Frequency    Reason                     Performed    Due Date  --------------------------------------------------------------------------------    CMP.........  12 months..  hydroCHLOROthiazide,       11- 11-                             losartan, metFORMIN......    HBA1C.......  6 months...  metFORMIN................  11-   05-    Powered by "[employer] by [employer]. Reference number: [id number].   3/25/2022 1:32:24 PM CDT

## 2022-04-04 RX ORDER — VALSARTAN 80 MG/1
80 TABLET ORAL DAILY
Qty: 30 TABLET | Refills: 3 | Status: SHIPPED | OUTPATIENT
Start: 2022-04-04 | End: 2022-04-20 | Stop reason: SDUPTHER

## 2022-04-04 NOTE — TELEPHONE ENCOUNTER
BP is still high.  Please call with med changes.    Continue taking:  -hydrochlorothiazide 25 mg once a day    Stop taking:  -losartan 100 mg once a day    New meds:  -valsartan 80 mg once a day    Please keep checking your home blood pressures about once a day.  I would like to verify that your home blood pressure cuff is accurate.    -Please schedule nurse visit for BP check in about 1 week.  Remind her to bring her home BP cuff to the nurse visit to compare.  Thanks!

## 2022-04-05 NOTE — TELEPHONE ENCOUNTER
Patient informed of medication changes and verbalize understanding. She states that she will continue to take medication she has of Losartan because she just had it refilled. Patient was informed that Dr. Mistry does not recommends she continues that particular medication. Patient verbalize understanding and informed of new medication that was sent to pharmacy.  Patient was scheduled for nurse visit blood pressure check on 4/12/22.

## 2022-04-11 ENCOUNTER — PATIENT MESSAGE (OUTPATIENT)
Dept: OPHTHALMOLOGY | Facility: CLINIC | Age: 72
End: 2022-04-11
Payer: MEDICARE

## 2022-04-12 ENCOUNTER — TELEPHONE (OUTPATIENT)
Dept: INTERNAL MEDICINE | Facility: CLINIC | Age: 72
End: 2022-04-12

## 2022-04-12 ENCOUNTER — CLINICAL SUPPORT (OUTPATIENT)
Dept: INTERNAL MEDICINE | Facility: CLINIC | Age: 72
End: 2022-04-12
Payer: MEDICARE

## 2022-04-12 VITALS — HEART RATE: 67 BPM | OXYGEN SATURATION: 99 % | DIASTOLIC BLOOD PRESSURE: 60 MMHG | SYSTOLIC BLOOD PRESSURE: 138 MMHG

## 2022-04-12 PROCEDURE — 99999 PR PBB SHADOW E&M-EST. PATIENT-LVL III: CPT | Mod: PBBFAC,,,

## 2022-04-12 PROCEDURE — 99999 PR PBB SHADOW E&M-EST. PATIENT-LVL III: ICD-10-PCS | Mod: PBBFAC,,,

## 2022-04-12 NOTE — TELEPHONE ENCOUNTER
"Jennifer Motley 71 y.o. female is here or Blood Pressure check.     Diagnosed with Hypertension yes.    Patient took blood pressure medication today yes.  Last dose of blood pressure medication was taken at 0730 . Patient took1. "Lostran" but does not have bottle or written name, nor is  it on the medication list  2. HCTZ 25 mg daily 3. Verapamil 180 mg States has not started Valsartan as wanted to see how HCTZ , Verapamil 180 mg and "Lostran" works    All Medications and OTC medication updated yes    Creatinine   Date Value Ref Range Status   11/16/2020 0.8 0.5 - 1.4 mg/dL Final     Sodium   Date Value Ref Range Status   11/16/2020 140 136 - 145 mmol/L Final     Potassium   Date Value Ref Range Status   11/16/2020 4.2 3.5 - 5.1 mmol/L Final         Current Outpatient Medications:     b complex vitamins capsule, Take 1 capsule by mouth once daily., Disp: , Rfl:     B-complex with vitamin C (SUPER B COMPLEX-VITAMIN C) tablet, Take 1 tablet by mouth once daily., Disp: 90 tablet, Rfl: 3    calcium-vitamin D3 500 mg(1,250mg) -200 unit per tablet, Take 1 tablet by mouth once daily., Disp: , Rfl:     cranberry 500 mg Cap, Take 1 capsule by mouth once daily., Disp: 90 capsule, Rfl: 3    dorzolamide (TRUSOPT) 2 % ophthalmic solution, Place 1 drop into both eyes 3 (three) times daily., Disp: 30 mL, Rfl: 3    FLAXSEED ORAL, Take 1 tablet by mouth once daily at 6am. , Disp: , Rfl:     hydroCHLOROthiazide (HYDRODIURIL) 25 MG tablet, Take 1 tablet (25 mg total) by mouth once daily., Disp: 90 tablet, Rfl: 3    metFORMIN (GLUCOPHAGE) 500 MG tablet, Take 1 tablet (500 mg total) by mouth daily with breakfast., Disp: 90 tablet, Rfl: 3    multivitamin capsule, Take 1 capsule by mouth once daily., Disp: , Rfl:     TOVIAZ 4 mg Tb24, Take 1 tablet by mouth once daily., Disp: , Rfl:     TRAVATAN Z 0.004 % ophthalmic solution, Place 1 drop into both eyes nightly. Disp 3 bottles of 2.5 mls each, Disp: 7.5 mL, Rfl: 3    turmeric " root extract 500 mg Cap, Take 1 each by mouth once daily., Disp: 90 capsule, Rfl: 3    verapamiL (CALAN-SR) 180 MG CR tablet, Take 1 tablet (180 mg total) by mouth every evening., Disp: 90 tablet, Rfl: 3    aspirin (ECOTRIN) 81 MG EC tablet, Take 1 tablet (81 mg total) by mouth once daily., Disp: 90 tablet, Rfl: 3    valsartan (DIOVAN) 80 MG tablet, Take 1 tablet (80 mg total) by mouth once daily. (Patient not taking: Reported on 4/12/2022), Disp: 30 tablet, Rfl: 3    Does patient have record of home blood pressure readings / Blood Pressure Log yes.   129/81  ,138/61. 142/61, 166/67  Does the pt have any complaints today in regards to their blood pressure medication? no. . Patient is asymptomatic.   Were you sitting still for 5-10 minutes prior to taking your Blood pressure?  No  Manual Blood pressure reading was Right Arm BP: 138/60, Pulse: 67       Left Arm  138/60   Pt's Home blood pressure machine read  Right Arm 141/69 p 69   Left Arm 111/59, P 73,  Updated vitals yes  Has your blood pressure monitor ever been checked? yes When was last time we checked your blood pressure monitor? Today  Given log to record BP and information sheet on BP. Will check out digital on second floor today   Follow up date is 05/18/2022  Dr. Mistry notified.

## 2022-04-13 ENCOUNTER — PATIENT MESSAGE (OUTPATIENT)
Dept: INTERNAL MEDICINE | Facility: CLINIC | Age: 72
End: 2022-04-13
Payer: MEDICARE

## 2022-04-13 DIAGNOSIS — I10 ESSENTIAL HYPERTENSION: ICD-10-CM

## 2022-04-13 NOTE — TELEPHONE ENCOUNTER
Her systolic blood pressure is a little high. We're aiming for less than 130.  Please tell her to switch the Losartan to Valsartan as prescribed.     Please keep checking your home blood pressures about once a day, and we'll send you a message in 1-2 weeks to get an idea of your home blood pressure numbers in case further medication adjustments are needed.

## 2022-04-14 NOTE — TELEPHONE ENCOUNTER
Spoke to Ms. Motley and informed her per Dr. Mistry that Her systolic blood pressure is a little high. We're aiming for less than 130.  Please tell her to switch the Losartan to Valsartan as prescribed.      Please keep checking your home blood pressures about once a day, and we'll send you a message in 1-2 weeks to get an idea of your home blood pressure numbers in case further medication adjustments are needed.    Patient states understanding and instructed to call the office for any further questions or concerns.

## 2022-04-20 ENCOUNTER — PATIENT MESSAGE (OUTPATIENT)
Dept: INTERNAL MEDICINE | Facility: CLINIC | Age: 72
End: 2022-04-20
Payer: MEDICARE

## 2022-04-20 VITALS — SYSTOLIC BLOOD PRESSURE: 137 MMHG | DIASTOLIC BLOOD PRESSURE: 64 MMHG

## 2022-04-20 DIAGNOSIS — I10 ESSENTIAL HYPERTENSION: ICD-10-CM

## 2022-04-20 RX ORDER — VALSARTAN 160 MG/1
160 TABLET ORAL DAILY
Qty: 30 TABLET | Refills: 3 | Status: SHIPPED | OUTPATIENT
Start: 2022-04-20 | End: 2022-04-22 | Stop reason: SDUPTHER

## 2022-04-22 ENCOUNTER — PATIENT OUTREACH (OUTPATIENT)
Dept: ADMINISTRATIVE | Facility: HOSPITAL | Age: 72
End: 2022-04-22
Payer: MEDICARE

## 2022-04-22 ENCOUNTER — PATIENT MESSAGE (OUTPATIENT)
Dept: INTERNAL MEDICINE | Facility: CLINIC | Age: 72
End: 2022-04-22
Payer: MEDICARE

## 2022-04-22 DIAGNOSIS — E11.9 DIABETES MELLITUS WITHOUT COMPLICATION: Primary | ICD-10-CM

## 2022-04-22 RX ORDER — VALSARTAN 80 MG/1
80 TABLET ORAL DAILY
Qty: 30 TABLET | Refills: 3 | Status: SHIPPED | OUTPATIENT
Start: 2022-04-22 | End: 2022-05-18 | Stop reason: SDUPTHER

## 2022-04-24 ENCOUNTER — CLINICAL SUPPORT (OUTPATIENT)
Dept: CARDIOLOGY | Facility: HOSPITAL | Age: 72
End: 2022-04-24
Payer: MEDICARE

## 2022-04-24 DIAGNOSIS — Z95.818 PRESENCE OF OTHER CARDIAC IMPLANTS AND GRAFTS: ICD-10-CM

## 2022-04-24 PROCEDURE — G2066 INTER DEVC REMOTE 30D: HCPCS | Performed by: INTERNAL MEDICINE

## 2022-05-09 ENCOUNTER — PATIENT MESSAGE (OUTPATIENT)
Dept: INTERNAL MEDICINE | Facility: CLINIC | Age: 72
End: 2022-05-09
Payer: MEDICARE

## 2022-05-09 VITALS — SYSTOLIC BLOOD PRESSURE: 135 MMHG | DIASTOLIC BLOOD PRESSURE: 82 MMHG

## 2022-05-10 NOTE — TELEPHONE ENCOUNTER
AM readings high, but PM readings controlled. Will clarify timing of AM BP readings in relation to meds during her upcoming visit next week.

## 2022-05-11 DIAGNOSIS — Z12.11 COLON CANCER SCREENING: ICD-10-CM

## 2022-05-18 ENCOUNTER — OFFICE VISIT (OUTPATIENT)
Dept: INTERNAL MEDICINE | Facility: CLINIC | Age: 72
End: 2022-05-18
Payer: MEDICARE

## 2022-05-18 ENCOUNTER — PATIENT MESSAGE (OUTPATIENT)
Dept: INTERNAL MEDICINE | Facility: CLINIC | Age: 72
End: 2022-05-18

## 2022-05-18 VITALS
HEIGHT: 64 IN | SYSTOLIC BLOOD PRESSURE: 130 MMHG | HEART RATE: 68 BPM | DIASTOLIC BLOOD PRESSURE: 62 MMHG | OXYGEN SATURATION: 98 % | WEIGHT: 201.25 LBS | BODY MASS INDEX: 34.36 KG/M2

## 2022-05-18 DIAGNOSIS — I10 ESSENTIAL HYPERTENSION: ICD-10-CM

## 2022-05-18 DIAGNOSIS — I47.29 IDIOPATHIC VENTRICULAR TACHYCARDIA: ICD-10-CM

## 2022-05-18 DIAGNOSIS — M79.671 PAIN OF RIGHT HEEL: Primary | ICD-10-CM

## 2022-05-18 DIAGNOSIS — D17.22 LIPOMA OF LEFT UPPER EXTREMITY: ICD-10-CM

## 2022-05-18 DIAGNOSIS — R06.83 SNORING: ICD-10-CM

## 2022-05-18 DIAGNOSIS — E11.42 TYPE 2 DIABETES MELLITUS WITH DIABETIC POLYNEUROPATHY, WITHOUT LONG-TERM CURRENT USE OF INSULIN: ICD-10-CM

## 2022-05-18 PROCEDURE — 1160F RVW MEDS BY RX/DR IN RCRD: CPT | Mod: CPTII,S$GLB,, | Performed by: INTERNAL MEDICINE

## 2022-05-18 PROCEDURE — 4010F ACE/ARB THERAPY RXD/TAKEN: CPT | Mod: CPTII,S$GLB,, | Performed by: INTERNAL MEDICINE

## 2022-05-18 PROCEDURE — 99999 PR PBB SHADOW E&M-EST. PATIENT-LVL IV: CPT | Mod: PBBFAC,,, | Performed by: INTERNAL MEDICINE

## 2022-05-18 PROCEDURE — 99999 PR PBB SHADOW E&M-EST. PATIENT-LVL IV: ICD-10-PCS | Mod: PBBFAC,,, | Performed by: INTERNAL MEDICINE

## 2022-05-18 PROCEDURE — 1101F PT FALLS ASSESS-DOCD LE1/YR: CPT | Mod: CPTII,S$GLB,, | Performed by: INTERNAL MEDICINE

## 2022-05-18 PROCEDURE — 1159F PR MEDICATION LIST DOCUMENTED IN MEDICAL RECORD: ICD-10-PCS | Mod: CPTII,S$GLB,, | Performed by: INTERNAL MEDICINE

## 2022-05-18 PROCEDURE — 3075F PR MOST RECENT SYSTOLIC BLOOD PRESS GE 130-139MM HG: ICD-10-PCS | Mod: CPTII,S$GLB,, | Performed by: INTERNAL MEDICINE

## 2022-05-18 PROCEDURE — 3008F BODY MASS INDEX DOCD: CPT | Mod: CPTII,S$GLB,, | Performed by: INTERNAL MEDICINE

## 2022-05-18 PROCEDURE — 1160F PR REVIEW ALL MEDS BY PRESCRIBER/CLIN PHARMACIST DOCUMENTED: ICD-10-PCS | Mod: CPTII,S$GLB,, | Performed by: INTERNAL MEDICINE

## 2022-05-18 PROCEDURE — 4010F PR ACE/ARB THEARPY RXD/TAKEN: ICD-10-PCS | Mod: CPTII,S$GLB,, | Performed by: INTERNAL MEDICINE

## 2022-05-18 PROCEDURE — 3288F PR FALLS RISK ASSESSMENT DOCUMENTED: ICD-10-PCS | Mod: CPTII,S$GLB,, | Performed by: INTERNAL MEDICINE

## 2022-05-18 PROCEDURE — 3072F LOW RISK FOR RETINOPATHY: CPT | Mod: CPTII,S$GLB,, | Performed by: INTERNAL MEDICINE

## 2022-05-18 PROCEDURE — 3078F DIAST BP <80 MM HG: CPT | Mod: CPTII,S$GLB,, | Performed by: INTERNAL MEDICINE

## 2022-05-18 PROCEDURE — 99215 PR OFFICE/OUTPT VISIT, EST, LEVL V, 40-54 MIN: ICD-10-PCS | Mod: S$GLB,,, | Performed by: INTERNAL MEDICINE

## 2022-05-18 PROCEDURE — 3288F FALL RISK ASSESSMENT DOCD: CPT | Mod: CPTII,S$GLB,, | Performed by: INTERNAL MEDICINE

## 2022-05-18 PROCEDURE — 99215 OFFICE O/P EST HI 40 MIN: CPT | Mod: S$GLB,,, | Performed by: INTERNAL MEDICINE

## 2022-05-18 PROCEDURE — 3072F PR LOW RISK FOR RETINOPATHY: ICD-10-PCS | Mod: CPTII,S$GLB,, | Performed by: INTERNAL MEDICINE

## 2022-05-18 PROCEDURE — 3078F PR MOST RECENT DIASTOLIC BLOOD PRESSURE < 80 MM HG: ICD-10-PCS | Mod: CPTII,S$GLB,, | Performed by: INTERNAL MEDICINE

## 2022-05-18 PROCEDURE — 3075F SYST BP GE 130 - 139MM HG: CPT | Mod: CPTII,S$GLB,, | Performed by: INTERNAL MEDICINE

## 2022-05-18 PROCEDURE — 1125F PR PAIN SEVERITY QUANTIFIED, PAIN PRESENT: ICD-10-PCS | Mod: CPTII,S$GLB,, | Performed by: INTERNAL MEDICINE

## 2022-05-18 PROCEDURE — 3008F PR BODY MASS INDEX (BMI) DOCUMENTED: ICD-10-PCS | Mod: CPTII,S$GLB,, | Performed by: INTERNAL MEDICINE

## 2022-05-18 PROCEDURE — 1159F MED LIST DOCD IN RCRD: CPT | Mod: CPTII,S$GLB,, | Performed by: INTERNAL MEDICINE

## 2022-05-18 PROCEDURE — 1101F PR PT FALLS ASSESS DOC 0-1 FALLS W/OUT INJ PAST YR: ICD-10-PCS | Mod: CPTII,S$GLB,, | Performed by: INTERNAL MEDICINE

## 2022-05-18 PROCEDURE — 1125F AMNT PAIN NOTED PAIN PRSNT: CPT | Mod: CPTII,S$GLB,, | Performed by: INTERNAL MEDICINE

## 2022-05-18 RX ORDER — VALSARTAN 160 MG/1
160 TABLET ORAL DAILY
Qty: 90 TABLET | Refills: 3 | Status: SHIPPED | OUTPATIENT
Start: 2022-05-18 | End: 2023-09-11

## 2022-05-18 RX ORDER — METFORMIN HYDROCHLORIDE 500 MG/1
500 TABLET ORAL
Qty: 90 TABLET | Refills: 3 | Status: SHIPPED | OUTPATIENT
Start: 2022-05-18 | End: 2024-02-21

## 2022-05-18 NOTE — PATIENT INSTRUCTIONS
"Thank you for your message. We have been getting a lot of questions like yours.     [facility] is offering COVID-19 vaccinations in accordance to the recommendations of the [facility].  [facility] recommends scheduling your COVID Vaccine via [employer] by following the directions outlined below.      To Schedule your vaccine on the [employer] website:  Choose "Visits" then "Schedule an Appointment" and select the visit tile titled "COVID-19 Vaccine."    To Schedule your vaccine on the [employer] adrianne:  Choose "Appointments" then "Schedule an Appointment" then "Tell us why you're coming in" and select the visit tile titled "COVID Vaccine"      Patients may also call [phone] if they do not have a [facility] account.    More times and dates will become available as we receive more vaccine on a weekly basis. We encourage you to continue to check [facility] as more slots become available and appreciate your patience during this process.    Due to high activity, the [facility] website and COVID hotline may experience a slowdown or long wait times. We sincerely apologize for the inconvenience and appreciate your patience as you try and schedule your vaccination.    We are hopeful that the vaccine will be available to more members of the public soon. We encourage community members and patients to visit [email or web address] or ldh.[place].gov/[email or web address] or covidvaccine.[place].gov for the latest information and resources.     "

## 2022-05-18 NOTE — PROGRESS NOTES
Subjective:       Patient ID: Jennifer Motley is a 71 y.o. female who  has a past medical history of Cataract, Diabetes mellitus type II, controlled, Glaucoma, Hypertension, Idiopathic ventricular tachycardia, Obese, Ocular hypertension, Overactive bladder, and Thyroid cyst.    Chief Complaint: Heel Pain     History was obtained from the patient and supplemented through chart review  -follows c Ophthalmology for glaucoma, DM.  -reviewed OSH MMG, labs  -OBGYN at [facility] Dr. Natalie Balderas.    Was a  at [employer].  Retired 6/2021.    HPI    R Achilles pain:  Occurs in the AM after standing. Has to stretch.  No pain at the plantar aspect or calcaneus. Wears flip flops.    HTN:    History of Vtach as below.  Tried to switch Verapamil to Toprol 50 d/t constipation, but Toprol 50 caused her to feel out of breath.  Stopped Toprol with resolution of symptoms.    LEFTY eval as below.    Pt's BP is controlled. On verapamil 180 qHS, HCTZ 25 qAM, valsartan 160 qAM.  Takes a.m. meds around 8:00 a.m..  Tolerating meds well.    Home BP:  Home cuff accurate.  Has noticed improved BP with dose titration of valsartan.  Checks a.m. BP possibly <1 hour after meds. Home BP -149/70s. HR 60-80.   PM -133/60s; then takes verapamil at night.    OSH CMP WNL 9-2021. Cr 0.75.  Normal GFR.     H/o Vtach:  Had stress d/t deaths in the family.  Stress TTE  with normal EF, no ischemia, but did develop PVCs, VT.  Was asx during the stress test.  ILR was implanted  w/o VT.    On verapamil 180. Follows with Cards/EP annually and continuing to monitor.  Has device check.    Snoring:    No apnea. Sometimes with daytime fatigue.  Feels refreshed after adequate sleep.  +HTN, Obesity.  Saw sleep clinic.  Did not schedule PSG yet d/t change in insurance.      DM2 is controlled.  Currently pt is taking metformin 500 daily. H/o constipation.  No loose stool, nausea.  Sees OSH endocrinologist, Dr. Ravi Estrada in [place], q4  mo. She declined injectables.     OSH labs  A1c 6.4.    Exercise: 3 miles 2/week. Helps with sleep. Has an incentive with [employer] to exercise. Joined a line dancing class.  Stopped exercise after her  passed.  Likes to keep track on her phone, tracks her steps.  Wants to start yoga on Saturdays at the [employer].  Has a yoga tape.  Also has walk at home videos.  Wants to exercise in the AM.  Has work out buddies.    Diet: not much red meat.  Restarted Weight Watchers to help with portions.    Without elevated microalbumin creatinine ratio.  Not on an ACE/ARB.   Retinal exams: 12/2021.  Sees Ophthalmology for glaucoma.  No retinopathy.  Foot exams:  11/2021  Lab Results   Component Value Date/Time    HGBA1C 6.5 (H) 11/15/2021 09:25 AM    HGBA1C 6.3 (H) 05/13/2021 08:28 AM    HGBA1C 6.5 (H) 11/16/2020 08:24 AM     Upper LUE mass:  Just noticed this.  No pain. Denies infx sx. No fever.            Not addressed today.  Candidate for statin therapy due to increased cardiovascular risk:    Diet as above.  Not on statin. +DM.  She declines statin.    OSH labs : , HDL 70, .   FLP controlled, but ASCVD elevated. +DM. Previously discussed R/B on statin and she declined.  Endocrine is monitoring FLP. Encouraged dietary changes.  Lab Results   Component Value Date    LDLCALC 105.0 11/16/2020     The 10-year ASCVD risk score (Eric GABY Jr., et al., 2013) is: 25.6%    Values used to calculate the score:      Age: 71 years      Sex: Female      Is Non- : Yes      Diabetic: Yes      Tobacco smoker: No      Systolic Blood Pressure: 130 mmHg      Is BP treated: Yes      HDL Cholesterol: 59 mg/dL      Total Cholesterol: 186 mg/dL    Obesity:  On metformin.  Exercise as above.  Stable.  Cont metformin.  Encouraged exercise. Provided exercise videos. Previously declined injectables.  BMI Readings from Last 3 Encounters:   05/18/22 34.55 kg/m²   11/24/21 33.81 kg/m²   11/18/21 33.87  "kg/m²      Thyroid cyst:  Followed by [facility] endocrinologist at [facility], Dr. Estrada. Previous thyroid U/S  with subcentimeter nodules.  Repeat U/S ; no report. OSH TSH WNL 3-2021.   OS endocrinologist is monitoring.  Lab Results   Component Value Date    TSH 0.847 11/16/2020     Osteopenia, Vitamin D deficiency:   [facility]H DXA at [facility].  DXA  BMD low FRAX score.  Is taking Ca/vit D supplements.  OSH Vit D 33 (03/23/2022)  Exercise:  As above.  Low FRAX. Cont calcium, vitamin-D supplement.  Encouraged exercise.  Following with endocrinology.  Lab Results   Component Value Date    [id number] 46 11/16/2020     Overactive bladder:  On Trospium.  Sees OS Urology, Dr. Sampson.    Review of Systems   Constitutional: Negative for activity change and appetite change.   Respiratory: Negative for wheezing.    Cardiovascular: Negative for leg swelling.   Musculoskeletal: Positive for arthralgias. Negative for gait problem.   Skin: Negative for rash and wound.   Hematological: Negative for adenopathy.   Psychiatric/Behavioral: Negative for confusion. The patient is not nervous/anxious.        I personally reviewed Past Medical History, Past Surgical History, Social History, and Family History.    Objective:      Vitals:    05/18/22 1034   BP: 130/62   Pulse: 68   SpO2: 98%   Weight: 91.3 kg (201 lb 4.5 oz)   Height: 5' 4" (1.626 m)      Physical Exam  Constitutional:       General: She is not in acute distress.     Appearance: She is well-developed. She is not diaphoretic.   HENT:      Head: Normocephalic and atraumatic.      Comments: Hirsutism     Nose: Nose normal.      Mouth/Throat:      Pharynx: No oropharyngeal exudate.      Comments: Mallampati IV  Eyes:      General: No scleral icterus.        Right eye: No discharge.         Left eye: No discharge.   Neck:      Thyroid: No thyromegaly.      Trachea: No tracheal deviation.   Cardiovascular:      Rate and Rhythm: Normal rate and regular rhythm.      " Heart sounds: Normal heart sounds. No murmur heard.  Pulmonary:      Effort: Pulmonary effort is normal. No respiratory distress.      Breath sounds: Normal breath sounds. No wheezing.   Abdominal:      General: Bowel sounds are normal. There is no distension.      Palpations: Abdomen is soft.      Tenderness: There is no abdominal tenderness.   Musculoskeletal:         General: No deformity.      Cervical back: Neck supple.      Right lower leg: No edema.      Left lower leg: No edema.   Feet:      Comments: Low arches. No calcaneal pain to palpation  Lymphadenopathy:      Cervical: No cervical adenopathy.   Skin:     General: Skin is warm and dry.      Findings: No erythema.             Comments: Keloid on mid left chest near ILR.   Neurological:      Mental Status: She is alert.      Cranial Nerves: No cranial nerve deficit.      Gait: Gait normal.   Psychiatric:         Behavior: Behavior normal.           Lab Results   Component Value Date    WBC 5.63 11/16/2020    HGB 12.4 11/16/2020    HCT 39.7 11/16/2020     11/16/2020    CHOL 186 11/16/2020    TRIG 110 11/16/2020    HDL 59 11/16/2020    ALT 25 11/16/2020    AST 20 11/16/2020     11/16/2020    K 4.2 11/16/2020     11/16/2020    CREATININE 0.8 11/16/2020    BUN 16 11/16/2020    CO2 29 11/16/2020    TSH 0.847 11/16/2020    HGBA1C 6.5 (H) 11/15/2021       The 10-year ASCVD risk score (Packwaukeeoracio GRIFFIN Jr., et al., 2013) is: 25.6%    Values used to calculate the score:      Age: 71 years      Sex: Female      Is Non- : Yes      Diabetic: Yes      Tobacco smoker: No      Systolic Blood Pressure: 130 mmHg      Is BP treated: Yes      HDL Cholesterol: 59 mg/dL      Total Cholesterol: 186 mg/dL    (Imaging have been independently reviewed)  None    OSH MMG at [facility] 1/19/22 normal, dense breast tissue. Repeat in 1 year.    Assessment:       1. Pain of right heel    2. Essential hypertension    3. Idiopathic ventricular tachycardia     4. Snoring    5. Type 2 diabetes mellitus with diabetic polyneuropathy, without long-term current use of insulin    6. Lipoma of left upper extremity          Plan:       Jennifer was seen today for heel pain.    Diagnoses and all orders for this visit:    Pain of right heel  Comments:  DDx Achilles tendinitis, plantar fasciitis.  Recommend arch support, stretches, avoid flip-flops.  Provided HEP. Advised to see Podiatry if persistent.    Essential hypertension  Comments:  Controlled. Monitor home BP. Check BP at least 1 hour after meds. Cont current meds.  Consider Aldactone d/t hirsutism.  Orders:  -     valsartan (DIOVAN) 160 MG tablet; Take 1 tablet (160 mg total) by mouth once daily.    Idiopathic ventricular tachycardia  Comments:  Controlled.  Continue verapamil.  Following with EP/Cardiology.  Recommend LEFTY eval as below.    Snoring  Comments:  Advised to schedule sleep study d/t HTN and Vtach.     Type 2 diabetes mellitus with diabetic polyneuropathy, without long-term current use of insulin  Comments:  A1C controlled. Cont metformin 500 daily. Has A1cs c OSH Endo.   Orders:  -     metFORMIN (GLUCOPHAGE) 500 MG tablet; Take 1 tablet (500 mg total) by mouth daily with breakfast.    Lipoma of left upper extremity  Comments:  Most c/w lipoma. Discussed benign nature. She declined U/S.         Side effects of medication(s) were discussed in detail and patient voiced understanding.  Patient will call back for any issues or complications.     I have spent a total of 50 minutes with the patient as well as reviewing the chart/medical record and placing orders on the day of the visit. Discussed HTN, lipoma, foot pain, LEFTY eval.    RTC in 4-6 month(s) or sooner PRN for HTN, DM.

## 2022-05-24 ENCOUNTER — CLINICAL SUPPORT (OUTPATIENT)
Dept: CARDIOLOGY | Facility: HOSPITAL | Age: 72
End: 2022-05-24
Payer: MEDICARE

## 2022-05-24 DIAGNOSIS — Z95.818 PRESENCE OF OTHER CARDIAC IMPLANTS AND GRAFTS: ICD-10-CM

## 2022-05-24 PROCEDURE — 93298 CARDIAC DEVICE CHECK - REMOTE: ICD-10-PCS | Mod: ,,, | Performed by: INTERNAL MEDICINE

## 2022-05-24 PROCEDURE — G2066 INTER DEVC REMOTE 30D: HCPCS | Performed by: INTERNAL MEDICINE

## 2022-05-24 PROCEDURE — 93298 REM INTERROG DEV EVAL SCRMS: CPT | Mod: ,,, | Performed by: INTERNAL MEDICINE

## 2022-06-14 ENCOUNTER — OFFICE VISIT (OUTPATIENT)
Dept: OPHTHALMOLOGY | Facility: CLINIC | Age: 72
End: 2022-06-14
Payer: MEDICARE

## 2022-06-14 DIAGNOSIS — E11.9 TYPE 2 DIABETES MELLITUS WITHOUT OPHTHALMIC MANIFESTATIONS: ICD-10-CM

## 2022-06-14 DIAGNOSIS — H40.1131 PRIMARY OPEN ANGLE GLAUCOMA (POAG) OF BOTH EYES, MILD STAGE: Primary | ICD-10-CM

## 2022-06-14 DIAGNOSIS — H25.13 CATARACT, NUCLEAR SCLEROTIC, BOTH EYES: ICD-10-CM

## 2022-06-14 PROCEDURE — 4010F ACE/ARB THERAPY RXD/TAKEN: CPT | Mod: CPTII,S$GLB,, | Performed by: OPHTHALMOLOGY

## 2022-06-14 PROCEDURE — 1126F PR PAIN SEVERITY QUANTIFIED, NO PAIN PRESENT: ICD-10-PCS | Mod: CPTII,S$GLB,, | Performed by: OPHTHALMOLOGY

## 2022-06-14 PROCEDURE — 92012 PR EYE EXAM, EST PATIENT,INTERMED: ICD-10-PCS | Mod: S$GLB,,, | Performed by: OPHTHALMOLOGY

## 2022-06-14 PROCEDURE — 1160F PR REVIEW ALL MEDS BY PRESCRIBER/CLIN PHARMACIST DOCUMENTED: ICD-10-PCS | Mod: CPTII,S$GLB,, | Performed by: OPHTHALMOLOGY

## 2022-06-14 PROCEDURE — 92020 PR SPECIAL EYE EVAL,GONIOSCOPY: ICD-10-PCS | Mod: S$GLB,,, | Performed by: OPHTHALMOLOGY

## 2022-06-14 PROCEDURE — 1159F MED LIST DOCD IN RCRD: CPT | Mod: CPTII,S$GLB,, | Performed by: OPHTHALMOLOGY

## 2022-06-14 PROCEDURE — 3288F PR FALLS RISK ASSESSMENT DOCUMENTED: ICD-10-PCS | Mod: CPTII,S$GLB,, | Performed by: OPHTHALMOLOGY

## 2022-06-14 PROCEDURE — 1160F RVW MEDS BY RX/DR IN RCRD: CPT | Mod: CPTII,S$GLB,, | Performed by: OPHTHALMOLOGY

## 2022-06-14 PROCEDURE — 99999 PR PBB SHADOW E&M-EST. PATIENT-LVL III: CPT | Mod: PBBFAC,,, | Performed by: OPHTHALMOLOGY

## 2022-06-14 PROCEDURE — 99999 PR PBB SHADOW E&M-EST. PATIENT-LVL III: ICD-10-PCS | Mod: PBBFAC,,, | Performed by: OPHTHALMOLOGY

## 2022-06-14 PROCEDURE — 1101F PR PT FALLS ASSESS DOC 0-1 FALLS W/OUT INJ PAST YR: ICD-10-PCS | Mod: CPTII,S$GLB,, | Performed by: OPHTHALMOLOGY

## 2022-06-14 PROCEDURE — 92020 GONIOSCOPY: CPT | Mod: S$GLB,,, | Performed by: OPHTHALMOLOGY

## 2022-06-14 PROCEDURE — 3288F FALL RISK ASSESSMENT DOCD: CPT | Mod: CPTII,S$GLB,, | Performed by: OPHTHALMOLOGY

## 2022-06-14 PROCEDURE — 4010F PR ACE/ARB THEARPY RXD/TAKEN: ICD-10-PCS | Mod: CPTII,S$GLB,, | Performed by: OPHTHALMOLOGY

## 2022-06-14 PROCEDURE — 1159F PR MEDICATION LIST DOCUMENTED IN MEDICAL RECORD: ICD-10-PCS | Mod: CPTII,S$GLB,, | Performed by: OPHTHALMOLOGY

## 2022-06-14 PROCEDURE — 1126F AMNT PAIN NOTED NONE PRSNT: CPT | Mod: CPTII,S$GLB,, | Performed by: OPHTHALMOLOGY

## 2022-06-14 PROCEDURE — 92012 INTRM OPH EXAM EST PATIENT: CPT | Mod: S$GLB,,, | Performed by: OPHTHALMOLOGY

## 2022-06-14 PROCEDURE — 1101F PT FALLS ASSESS-DOCD LE1/YR: CPT | Mod: CPTII,S$GLB,, | Performed by: OPHTHALMOLOGY

## 2022-06-14 NOTE — PROGRESS NOTES
HPI     DLS: 2022 Dr. Simons    71 y.o. female is here for 4 months gonio and IOP check. Denies eye pain   and f/f. After putting drops in eyes burn for a minute. No noticeable [facility]   changes since last visit. Sometimes miss doses.     Eye Med's: Travatan Z qhs OU                      Dorzolamide TID OU (has been missing mid day dose)       1. POAG OU   2. NS OU   3. Type 2 DM no DR    Last edited by Supriya Simons MD on 2022 11:31 AM. (History)              Assessment /Plan     For exam results, see Encounter Report.    Primary open angle glaucoma (POAG) of both eyes, mild stage    Cataract, nuclear sclerotic, both eyes    Type 2 diabetes mellitus without ophthalmic manifestations      1. Patient followed by Dr. Balwinder Gregg for many years. - post Mary   Pre-Mary - was seeing dr Nadine Aivla   Seen by Dr. Kebede in . Seen by Dr. Hensley (retina)  in .        Glaucoma (type and duration)    Primary open angle glaucoma OU. Mild.    First HVF    (outside records - fullish)    First photos   10/2021    Treatment / Drops started   About            Family history    Mother  since 2019. H/o glaucoma surgery/drops.         Glaucoma meds    Dorzolamide TID OU. Travatan QHS OU.         H/O adverse rxn to glaucoma drops:   H/o bundle branch block. Taken off of beta blockers.         LASERS    None        GLAUCOMA SURGERIES    None        OTHER EYE SURGERIES    None        CDR    0.75/0.8        Tbase    ?? Off gtts           Tmax    - on gtts           Ttarget    18 ou - may need to be lower              HVF   - many old outside test - pt brought in - all near normal - Dr Balwinder Gregg     1 test  to   - SNS  od // ?SAD / INS  os        Gonio    +3 ou         CCT    505/ 507         OCT    1 test  to  - RNFL - dec G/TI, bord NS/NI od // bord TI  os        Disc photos    10/18/21 with Dr. Simons    - Ttoday     (up from  - has been forgetting drops)    - Test done today    IOP // gonio    2, NS ou - mild monitor     3. Diabetes    No BDR     Plan:   Continue dorzolamide TID OU and Travatan QHS OU.  Avoid BB's - heart block   Monitor HVF's / and IOP and OCT going forward - to monitor for progression   + SNS od // ? INS os     Would be difficult to do slt - pt squeezes a lot with gonio     F/U 4 months if IOP still above target then consider brimonidine or simbrinza     Pt records were given back to her - they have not been scanned into Epic - (10/18/2021)    But the important data has been - reviewed and abstracted

## 2022-06-23 ENCOUNTER — CLINICAL SUPPORT (OUTPATIENT)
Dept: CARDIOLOGY | Facility: HOSPITAL | Age: 72
End: 2022-06-23
Payer: MEDICARE

## 2022-06-23 DIAGNOSIS — Z95.818 PRESENCE OF OTHER CARDIAC IMPLANTS AND GRAFTS: ICD-10-CM

## 2022-06-23 PROCEDURE — G2066 INTER DEVC REMOTE 30D: HCPCS | Performed by: INTERNAL MEDICINE

## 2022-07-23 ENCOUNTER — CLINICAL SUPPORT (OUTPATIENT)
Dept: CARDIOLOGY | Facility: HOSPITAL | Age: 72
End: 2022-07-23
Payer: MEDICARE

## 2022-07-23 DIAGNOSIS — Z95.818 PRESENCE OF OTHER CARDIAC IMPLANTS AND GRAFTS: ICD-10-CM

## 2022-07-23 PROCEDURE — 93298 REM INTERROG DEV EVAL SCRMS: CPT | Mod: ,,, | Performed by: INTERNAL MEDICINE

## 2022-07-23 PROCEDURE — 93298 CARDIAC DEVICE CHECK - REMOTE: ICD-10-PCS | Mod: ,,, | Performed by: INTERNAL MEDICINE

## 2022-08-22 ENCOUNTER — CLINICAL SUPPORT (OUTPATIENT)
Dept: CARDIOLOGY | Facility: HOSPITAL | Age: 72
End: 2022-08-22
Payer: MEDICARE

## 2022-08-22 DIAGNOSIS — Z95.818 PRESENCE OF OTHER CARDIAC IMPLANTS AND GRAFTS: ICD-10-CM

## 2022-08-22 PROCEDURE — G2066 INTER DEVC REMOTE 30D: HCPCS | Performed by: INTERNAL MEDICINE

## 2022-09-13 DIAGNOSIS — I10 ESSENTIAL HYPERTENSION: ICD-10-CM

## 2022-09-13 RX ORDER — HYDROCHLOROTHIAZIDE 25 MG/1
25 TABLET ORAL DAILY
Qty: 90 TABLET | Refills: 0 | Status: SHIPPED | OUTPATIENT
Start: 2022-09-13 | End: 2022-12-15 | Stop reason: SDUPTHER

## 2022-09-13 NOTE — TELEPHONE ENCOUNTER
Refill Routing Note   Medication(s) are not appropriate for processing by [facility] for the following reason(s):      - Required laboratory values are outdated    [facility] action(s):  Defer Medication-related problems identified: Requires labs     Medication Therapy Plan: Labs (CMP, a1c) outdated  Medication reconciliation completed: No     Appointments  past 12m or future 3m with PCP    Date Provider   Last Visit   5/18/2022 Tahira Mistry MD   Next Visit   Visit date not found Tahira Mistry MD   ED visits in past 90 days: 0        Note composed:3:20 PM 09/13/2022

## 2022-09-13 NOTE — TELEPHONE ENCOUNTER
Care Due:                  Date            Visit Type   Department     Provider  --------------------------------------------------------------------------------                                EP -                              PRIMARY      [facility] INTERNAL  Last Visit: 05-      CARE ([facility])   MEDICINE       Tahira Mistry  Next Visit: None Scheduled  None         None Found                                                            Last  Test          Frequency    Reason                     Performed    Due Date  --------------------------------------------------------------------------------    CMP.........  12 months..  hydroCHLOROthiazide,       11- 11-                             metFORMIN, valsartan.....    HBA1C.......  6 months...  metFORMIN................  11-   05-    [facility] Embedded Care Gaps. Reference number: [id number]. 9/13/2022   2:47:34 PM CDT

## 2022-09-14 NOTE — TELEPHONE ENCOUNTER
Patient informed of medication approval. Patient scheduled for HTN follow up 12/15/22 with PCP. Thanks.

## 2022-09-20 ENCOUNTER — TELEPHONE (OUTPATIENT)
Dept: OPHTHALMOLOGY | Facility: CLINIC | Age: 72
End: 2022-09-20
Payer: MEDICARE

## 2022-09-20 NOTE — TELEPHONE ENCOUNTER
----- Message from Jessica Quezada sent at 9/20/2022  1:16 PM CDT -----  Regarding: speak with office  Contact: chon Robert is returning call to Nurse Nelli Ansari#[phone] Chon

## 2022-09-20 NOTE — TELEPHONE ENCOUNTER
----- Message from Jessica Quezada sent at 9/20/2022  8:18 AM CDT -----  Regarding: speak with office  Contact: chon Robert states she has pink eye and needs a rx     [phone]

## 2022-09-21 ENCOUNTER — CLINICAL SUPPORT (OUTPATIENT)
Dept: CARDIOLOGY | Facility: HOSPITAL | Age: 72
End: 2022-09-21
Payer: MEDICARE

## 2022-09-21 DIAGNOSIS — Z95.818 PRESENCE OF OTHER CARDIAC IMPLANTS AND GRAFTS: ICD-10-CM

## 2022-09-21 PROCEDURE — 93298 REM INTERROG DEV EVAL SCRMS: CPT | Mod: ,,, | Performed by: INTERNAL MEDICINE

## 2022-09-21 PROCEDURE — G2066 INTER DEVC REMOTE 30D: HCPCS | Performed by: INTERNAL MEDICINE

## 2022-09-21 PROCEDURE — 93298 CARDIAC DEVICE CHECK - REMOTE: ICD-10-PCS | Mod: ,,, | Performed by: INTERNAL MEDICINE

## 2022-09-28 ENCOUNTER — OFFICE VISIT (OUTPATIENT)
Dept: URGENT CARE | Facility: CLINIC | Age: 72
End: 2022-09-28
Payer: MEDICARE

## 2022-09-28 VITALS
HEIGHT: 64 IN | TEMPERATURE: 99 F | HEART RATE: 79 BPM | RESPIRATION RATE: 18 BRPM | OXYGEN SATURATION: 98 % | BODY MASS INDEX: 34.36 KG/M2 | WEIGHT: 201.25 LBS | DIASTOLIC BLOOD PRESSURE: 72 MMHG | SYSTOLIC BLOOD PRESSURE: 169 MMHG

## 2022-09-28 DIAGNOSIS — J32.9 BACTERIAL SINUSITIS: ICD-10-CM

## 2022-09-28 DIAGNOSIS — R09.89 SINUS COMPLAINT: Primary | ICD-10-CM

## 2022-09-28 DIAGNOSIS — R05.9 COUGH: ICD-10-CM

## 2022-09-28 DIAGNOSIS — B96.89 BACTERIAL SINUSITIS: ICD-10-CM

## 2022-09-28 LAB
CTP QC/QA: YES
SARS-COV-2 RDRP RESP QL NAA+PROBE: NEGATIVE

## 2022-09-28 PROCEDURE — 4010F PR ACE/ARB THEARPY RXD/TAKEN: ICD-10-PCS | Mod: CPTII,S$GLB,, | Performed by: NURSE PRACTITIONER

## 2022-09-28 PROCEDURE — 1160F RVW MEDS BY RX/DR IN RCRD: CPT | Mod: CPTII,S$GLB,, | Performed by: NURSE PRACTITIONER

## 2022-09-28 PROCEDURE — 1159F PR MEDICATION LIST DOCUMENTED IN MEDICAL RECORD: ICD-10-PCS | Mod: CPTII,S$GLB,, | Performed by: NURSE PRACTITIONER

## 2022-09-28 PROCEDURE — 3072F LOW RISK FOR RETINOPATHY: CPT | Mod: CPTII,S$GLB,, | Performed by: NURSE PRACTITIONER

## 2022-09-28 PROCEDURE — 1125F PR PAIN SEVERITY QUANTIFIED, PAIN PRESENT: ICD-10-PCS | Mod: CPTII,S$GLB,, | Performed by: NURSE PRACTITIONER

## 2022-09-28 PROCEDURE — 3008F BODY MASS INDEX DOCD: CPT | Mod: CPTII,S$GLB,, | Performed by: NURSE PRACTITIONER

## 2022-09-28 PROCEDURE — U0002 COVID-19 LAB TEST NON-CDC: HCPCS | Mod: QW,S$GLB,, | Performed by: NURSE PRACTITIONER

## 2022-09-28 PROCEDURE — 1159F MED LIST DOCD IN RCRD: CPT | Mod: CPTII,S$GLB,, | Performed by: NURSE PRACTITIONER

## 2022-09-28 PROCEDURE — 1125F AMNT PAIN NOTED PAIN PRSNT: CPT | Mod: CPTII,S$GLB,, | Performed by: NURSE PRACTITIONER

## 2022-09-28 PROCEDURE — 3072F PR LOW RISK FOR RETINOPATHY: ICD-10-PCS | Mod: CPTII,S$GLB,, | Performed by: NURSE PRACTITIONER

## 2022-09-28 PROCEDURE — 4010F ACE/ARB THERAPY RXD/TAKEN: CPT | Mod: CPTII,S$GLB,, | Performed by: NURSE PRACTITIONER

## 2022-09-28 PROCEDURE — 1160F PR REVIEW ALL MEDS BY PRESCRIBER/CLIN PHARMACIST DOCUMENTED: ICD-10-PCS | Mod: CPTII,S$GLB,, | Performed by: NURSE PRACTITIONER

## 2022-09-28 PROCEDURE — 99214 OFFICE O/P EST MOD 30 MIN: CPT | Mod: S$GLB,,, | Performed by: NURSE PRACTITIONER

## 2022-09-28 PROCEDURE — 3077F SYST BP >= 140 MM HG: CPT | Mod: CPTII,S$GLB,, | Performed by: NURSE PRACTITIONER

## 2022-09-28 PROCEDURE — 3077F PR MOST RECENT SYSTOLIC BLOOD PRESSURE >= 140 MM HG: ICD-10-PCS | Mod: CPTII,S$GLB,, | Performed by: NURSE PRACTITIONER

## 2022-09-28 PROCEDURE — U0002: ICD-10-PCS | Mod: QW,S$GLB,, | Performed by: NURSE PRACTITIONER

## 2022-09-28 PROCEDURE — 3078F PR MOST RECENT DIASTOLIC BLOOD PRESSURE < 80 MM HG: ICD-10-PCS | Mod: CPTII,S$GLB,, | Performed by: NURSE PRACTITIONER

## 2022-09-28 PROCEDURE — 99214 PR OFFICE/OUTPT VISIT, EST, LEVL IV, 30-39 MIN: ICD-10-PCS | Mod: S$GLB,,, | Performed by: NURSE PRACTITIONER

## 2022-09-28 PROCEDURE — 3008F PR BODY MASS INDEX (BMI) DOCUMENTED: ICD-10-PCS | Mod: CPTII,S$GLB,, | Performed by: NURSE PRACTITIONER

## 2022-09-28 PROCEDURE — 3078F DIAST BP <80 MM HG: CPT | Mod: CPTII,S$GLB,, | Performed by: NURSE PRACTITIONER

## 2022-09-28 RX ORDER — PROMETHAZINE HYDROCHLORIDE AND DEXTROMETHORPHAN HYDROBROMIDE 6.25; 15 MG/5ML; MG/5ML
5 SYRUP ORAL EVERY 4 HOURS PRN
Qty: 118 ML | Refills: 0 | Status: SHIPPED | OUTPATIENT
Start: 2022-09-28 | End: 2022-10-08

## 2022-09-28 RX ORDER — AMOXICILLIN AND CLAVULANATE POTASSIUM 875; 125 MG/1; MG/1
1 TABLET, FILM COATED ORAL 2 TIMES DAILY
Qty: 20 TABLET | Refills: 0 | Status: SHIPPED | OUTPATIENT
Start: 2022-09-28 | End: 2022-10-08

## 2022-09-28 RX ORDER — AZELASTINE 1 MG/ML
1 SPRAY, METERED NASAL 2 TIMES DAILY
Qty: 30 ML | Refills: 0 | Status: SHIPPED | OUTPATIENT
Start: 2022-09-28 | End: 2022-10-17

## 2022-09-28 NOTE — PROGRESS NOTES
"Subjective:       Patient ID: Jennifer Motley is a 71 y.o. female.    Vitals:  height is 5' 4" (1.626 m) and weight is 91.3 kg (201 lb 4.5 oz). Her temporal temperature is 98.5 °F (36.9 °C). Her blood pressure is 169/72 (abnormal) and her pulse is 79. Her respiration is 18 and oxygen saturation is 98%.     Chief Complaint: Sinus Problem    Patient reports to clinic with the compliant of sinus problems that presented on Saturday ,patient states that she has taken Tylenol severe sinus cold and flu, however she hasn't taken anything today. Patient reports with sinus pressure that has her face tender to touch along with bilateral ear pain. Patient states that her pain is 8/10.    70 yo female with c/o ventricular tachycardia, type 2 diabetes, with c/o uri symptoms. She c/o sinus pressure and pain. Ear pain and chest congestion.     Sinus Problem  This is a new problem. The current episode started in the past 7 days. The problem is unchanged. There has been no fever. Her pain is at a severity of 8/10. The pain is mild. Associated symptoms include chills, congestion, coughing, ear pain, headaches, sinus pressure, sneezing and a sore throat. Pertinent negatives include no diaphoresis, hoarse voice, neck pain, shortness of breath or swollen glands. Past treatments include acetaminophen and oral decongestants. The treatment provided no relief.   Constitution: Positive for chills. Negative for sweating.   HENT:  Positive for ear pain, congestion, sinus pressure and sore throat.    Neck: Negative for neck pain.   Cardiovascular: Negative.    Respiratory:  Positive for cough. Negative for shortness of breath.    Gastrointestinal: Negative.    Endocrine: negative.   Genitourinary: Negative.  Negative for frequency and urgency.   Musculoskeletal: Negative.    Skin: Negative.    Allergic/Immunologic: Negative.  Positive for sneezing.   Neurological:  Positive for headaches.   Hematologic/Lymphatic: Negative.  "   Psychiatric/Behavioral: Negative.       Objective:      Physical Exam   Constitutional: She is oriented to person, place, and time. She appears well-developed. She is cooperative.  Non-toxic appearance. She does not appear ill. No distress.   HENT:   Head: Normocephalic and atraumatic.   Ears:   Right Ear: Hearing, tympanic membrane, external ear and ear canal normal.   Left Ear: Hearing, tympanic membrane, external ear and ear canal normal.   Nose: Mucosal edema, rhinorrhea and sinus tenderness present. No nasal deformity. No epistaxis. Right sinus exhibits maxillary sinus tenderness and frontal sinus tenderness. Left sinus exhibits maxillary sinus tenderness and frontal sinus tenderness.   Mouth/Throat: Uvula is midline, oropharynx is clear and moist and mucous membranes are normal. No trismus in the jaw. Normal dentition. No uvula swelling. No oropharyngeal exudate, posterior oropharyngeal edema or posterior oropharyngeal erythema.   Eyes: Conjunctivae and lids are normal. No scleral icterus.   Neck: Trachea normal and phonation normal. Neck supple. No edema present. No erythema present. No neck rigidity present.   Cardiovascular: Normal rate, regular rhythm, normal heart sounds and normal pulses.   Pulmonary/Chest: Effort normal and breath sounds normal. No respiratory distress. She has no decreased breath sounds. She has no rhonchi.   Abdominal: Normal appearance.   Musculoskeletal: Normal range of motion.         General: No deformity. Normal range of motion.   Neurological: She is alert and oriented to person, place, and time. She exhibits normal muscle tone. Coordination normal.   Skin: Skin is warm, dry, intact, not diaphoretic and not pale.   Psychiatric: Her speech is normal and behavior is normal. Judgment and thought content normal.   Nursing note and vitals reviewed.      Assessment:       1. Sinus complaint    2. Bacterial sinusitis    3. Cough          Plan:       Patient Instructions     Patient  "Instructions      Please follow up with your Primary care provider within 2-5 days if your signs and symptoms have not resolved or worsen.  The usual course of cold symptoms are 10-14 days.      If your condition worsens or fails to improve we recommend that you receive another evaluation at the emergency room immediately or contact your primary medical clinic to discuss your concerns.      You must understand that you have received an Urgent Care treatment only and that you may be released before all of your medical problems are known or treated.   You, the patient, will arrange for follow up care as instructed.      Tylenol or Ibuprofen can also be used as directed for pain/fever unless you have an allergy to them or medical condition such as stomach ulcers, kidney or liver disease or blood thinners etc for which you should not be taking these type of medications.      Take over the counter cough medication as directed as needed for cough.  You should avoid medications with pseudoephedrine or phenylephrine (any medication with "D") if you have high blood pressure as this can cause an elevation in your blood pressure. Instead consider Corcidin HBP as needed to prevent an elevated blood pressure.      Natural remedies of symptoms (as needed) include humidification, saline nasal sprays, and/or steamy showers.  Increase fluids, warm tea with honey, cough drops as needed.  You may also use salt water gargles for sore throat.     IF you received a steroid shot today - As discussed, this can elevate your blood pressure, elevate your blood sugar, water weight gain, nervous energy, redness to the face and dimpling of the skin at the injection site.      OVER THE COUNTER RECOMMENDATIONS/SUGGESTIONS.    Make sure to stay well hydrated.    Use Nasal Saline to mechanically move any post nasal drip from your eustachian tube or from the back of your throat.    Use warm salt water gargles to ease your throat pain. Warm salt water " gargles as needed for sore throat-  1/2 tsp salt to 1 cup warm water, gargle as desired.    Use an antihistamine such as Claritin, Zyrtec or Allegra to dry you out.     Use pseudoephedrine (behind the counter) to decongest. Pseudoephedrine  30 mg up to 240 mg /day. It can raise your blood pressure and give you palpitations.    Use mucinex (guaifenisin) to break up mucous up to 2400mg/day to loosen any mucous.   The mucinex DM pill has a cough suppressant that can be sedating. It can be used at night to stop the tickle at the back of your throat.  You can use Mucinex D (it has guaifenesin and a high dose of pseudoephedrine) in the mornings to help decongest.      Use Afrin in each nare for no longer than 3 days, as it is addictive. It can also dry out your mucous membranes and cause elevated blood pressure. This is especially useful if you are flying.    Use Flonase 1-2 sprays/nostril per day. It is a local acting steroid nasal spray, if you develop a bloody nose, stop using the medication immediately.    Sometimes Nyquil at night is beneficial to help you get some rest, however it is sedating and it does have an antihistamine, and tylenol.    Honey is a natural cough suppressant that can be used.    Tylenol up to 4,000 mg a day is safe for short periods and can be used for body aches, pain, and fever. However in high doses and prolonged use it can cause liver irritation.    Ibuprofen is a non-steroidal anti-inflammatory that can be used for body aches, pain, and fever.However it can also cause stomach irritation if over used.      Sinus complaint  -     POCT COVID-19 Rapid Screening    Bacterial sinusitis  -     azelastine (ASTELIN) 137 mcg (0.1 %) nasal spray; 1 spray (137 mcg total) by Nasal route 2 (two) times daily.  Dispense: 30 mL; Refill: 0  -     amoxicillin-clavulanate 875-125mg (AUGMENTIN) 875-125 mg per tablet; Take 1 tablet by mouth 2 (two) times daily. for 10 days  Dispense: 20 tablet; Refill:  0    Cough  -     promethazine-dextromethorphan (PROMETHAZINE-DM) 6.25-15 mg/5 mL Syrp; Take 5 mLs by mouth every 4 (four) hours as needed (cough).  Dispense: 118 mL; Refill: 0

## 2022-10-04 ENCOUNTER — HOSPITAL ENCOUNTER (OUTPATIENT)
Dept: CARDIOLOGY | Facility: CLINIC | Age: 72
Discharge: HOME | End: 2022-10-04
Attending: INTERNAL MEDICINE
Payer: MEDICARE

## 2022-10-04 ENCOUNTER — OFFICE VISIT (OUTPATIENT)
Dept: ELECTROPHYSIOLOGY | Facility: CLINIC | Age: 72
End: 2022-10-04
Payer: MEDICARE

## 2022-10-04 VITALS
SYSTOLIC BLOOD PRESSURE: 138 MMHG | DIASTOLIC BLOOD PRESSURE: 64 MMHG | BODY MASS INDEX: 34.55 KG/M2 | HEIGHT: 64 IN | WEIGHT: 202.38 LBS | HEART RATE: 77 BPM

## 2022-10-04 DIAGNOSIS — E11.9 CONTROLLED TYPE 2 DIABETES MELLITUS WITHOUT COMPLICATION, WITHOUT LONG-TERM CURRENT USE OF INSULIN: ICD-10-CM

## 2022-10-04 DIAGNOSIS — I49.8 OTHER SPECIFIED CARDIAC ARRHYTHMIAS: ICD-10-CM

## 2022-10-04 DIAGNOSIS — I10 ESSENTIAL HYPERTENSION: ICD-10-CM

## 2022-10-04 DIAGNOSIS — I47.29 IDIOPATHIC VENTRICULAR TACHYCARDIA: Primary | Chronic | ICD-10-CM

## 2022-10-04 PROCEDURE — 99214 OFFICE O/P EST MOD 30 MIN: CPT | Mod: S$GLB,,, | Performed by: INTERNAL MEDICINE

## 2022-10-04 PROCEDURE — 3075F SYST BP GE 130 - 139MM HG: CPT | Mod: CPTII,S$GLB,, | Performed by: INTERNAL MEDICINE

## 2022-10-04 PROCEDURE — 3072F LOW RISK FOR RETINOPATHY: CPT | Mod: CPTII,S$GLB,, | Performed by: INTERNAL MEDICINE

## 2022-10-04 PROCEDURE — 1159F PR MEDICATION LIST DOCUMENTED IN MEDICAL RECORD: ICD-10-PCS | Mod: CPTII,S$GLB,, | Performed by: INTERNAL MEDICINE

## 2022-10-04 PROCEDURE — 3075F PR MOST RECENT SYSTOLIC BLOOD PRESS GE 130-139MM HG: ICD-10-PCS | Mod: CPTII,S$GLB,, | Performed by: INTERNAL MEDICINE

## 2022-10-04 PROCEDURE — 3288F PR FALLS RISK ASSESSMENT DOCUMENTED: ICD-10-PCS | Mod: CPTII,S$GLB,, | Performed by: INTERNAL MEDICINE

## 2022-10-04 PROCEDURE — 3072F PR LOW RISK FOR RETINOPATHY: ICD-10-PCS | Mod: CPTII,S$GLB,, | Performed by: INTERNAL MEDICINE

## 2022-10-04 PROCEDURE — 1126F AMNT PAIN NOTED NONE PRSNT: CPT | Mod: CPTII,S$GLB,, | Performed by: INTERNAL MEDICINE

## 2022-10-04 PROCEDURE — 1101F PR PT FALLS ASSESS DOC 0-1 FALLS W/OUT INJ PAST YR: ICD-10-PCS | Mod: CPTII,S$GLB,, | Performed by: INTERNAL MEDICINE

## 2022-10-04 PROCEDURE — 1126F PR PAIN SEVERITY QUANTIFIED, NO PAIN PRESENT: ICD-10-PCS | Mod: CPTII,S$GLB,, | Performed by: INTERNAL MEDICINE

## 2022-10-04 PROCEDURE — 1101F PT FALLS ASSESS-DOCD LE1/YR: CPT | Mod: CPTII,S$GLB,, | Performed by: INTERNAL MEDICINE

## 2022-10-04 PROCEDURE — 99499 RISK ADDL DX/OHS AUDIT: ICD-10-PCS | Mod: HCNC,S$GLB,, | Performed by: INTERNAL MEDICINE

## 2022-10-04 PROCEDURE — 93010 ELECTROCARDIOGRAM REPORT: CPT | Mod: S$GLB,,, | Performed by: INTERNAL MEDICINE

## 2022-10-04 PROCEDURE — 99999 PR PBB SHADOW E&M-EST. PATIENT-LVL IV: ICD-10-PCS | Mod: PBBFAC,,, | Performed by: INTERNAL MEDICINE

## 2022-10-04 PROCEDURE — 93005 ELECTROCARDIOGRAM TRACING: CPT | Mod: S$GLB,,, | Performed by: INTERNAL MEDICINE

## 2022-10-04 PROCEDURE — 93005 RHYTHM STRIP: ICD-10-PCS | Mod: S$GLB,,, | Performed by: INTERNAL MEDICINE

## 2022-10-04 PROCEDURE — 99499 UNLISTED E&M SERVICE: CPT | Mod: HCNC,S$GLB,, | Performed by: INTERNAL MEDICINE

## 2022-10-04 PROCEDURE — 1159F MED LIST DOCD IN RCRD: CPT | Mod: CPTII,S$GLB,, | Performed by: INTERNAL MEDICINE

## 2022-10-04 PROCEDURE — 99214 PR OFFICE/OUTPT VISIT, EST, LEVL IV, 30-39 MIN: ICD-10-PCS | Mod: S$GLB,,, | Performed by: INTERNAL MEDICINE

## 2022-10-04 PROCEDURE — 3008F PR BODY MASS INDEX (BMI) DOCUMENTED: ICD-10-PCS | Mod: CPTII,S$GLB,, | Performed by: INTERNAL MEDICINE

## 2022-10-04 PROCEDURE — 93010 RHYTHM STRIP: ICD-10-PCS | Mod: S$GLB,,, | Performed by: INTERNAL MEDICINE

## 2022-10-04 PROCEDURE — 3078F DIAST BP <80 MM HG: CPT | Mod: CPTII,S$GLB,, | Performed by: INTERNAL MEDICINE

## 2022-10-04 PROCEDURE — 4010F PR ACE/ARB THEARPY RXD/TAKEN: ICD-10-PCS | Mod: CPTII,S$GLB,, | Performed by: INTERNAL MEDICINE

## 2022-10-04 PROCEDURE — 99999 PR PBB SHADOW E&M-EST. PATIENT-LVL IV: CPT | Mod: PBBFAC,,, | Performed by: INTERNAL MEDICINE

## 2022-10-04 PROCEDURE — 3288F FALL RISK ASSESSMENT DOCD: CPT | Mod: CPTII,S$GLB,, | Performed by: INTERNAL MEDICINE

## 2022-10-04 PROCEDURE — 4010F ACE/ARB THERAPY RXD/TAKEN: CPT | Mod: CPTII,S$GLB,, | Performed by: INTERNAL MEDICINE

## 2022-10-04 PROCEDURE — 3078F PR MOST RECENT DIASTOLIC BLOOD PRESSURE < 80 MM HG: ICD-10-PCS | Mod: CPTII,S$GLB,, | Performed by: INTERNAL MEDICINE

## 2022-10-04 PROCEDURE — 3008F BODY MASS INDEX DOCD: CPT | Mod: CPTII,S$GLB,, | Performed by: INTERNAL MEDICINE

## 2022-10-04 NOTE — PROGRESS NOTES
Subjective:    Patient ID:  Jennifer Motley is a 71 y.o. female who presents for evaluation of ILR follow-up and Nonsustained Ventricular Tachycardia    Referring Cardiologist: Verónica Turk MD  Primary Care Physician: Jaqueline Byrnes MD    HPI  Prior Hx:  I had the pleasure of seeing Mrs. Motley today in our electrophysiology clinic in consultation for her ventricular arrhythmia. As you are aware she is a pleasant 71 year-old woman with obesity, hypertension and type 2 diabetes mellitus. She was first assessed by Dr. Turk in 2017 when she presented for evaluation of atypical chest pain. A stress echocardiogram was performed noting a normal LVEF, no arrhythmias during exercise, no ischemia, and a hypertensive response at peak stress (SBP 217mmHg). She was seen by Dr. Byrnes 7/2019 and reported burning type chest discomfort during stressful situations. A treadmill stress echocardiogram was ordered and performed on 8/20/2019 which showed a preserved LVEF and no ischemia however with peak exercise she developed PVCs, salvos of monomorphic VT and 1 34-beat run of monomorphic VT all of the same etiology of the PVCs (LBBB, V3 transition, inferiorly directed). She was asymptomatic from this event during her stress test. She denies any history of near syncope/syncope. She denies palpitations or exertional chest discomfort/palpitations/light-headedness. She regularly exercises on a treadmill. After discussion she elected to proceed with ILR implantation.    I reviewed available ECGs in Epic including the stress tests. ECGs consistent with sinus rhythm with normal QRS and intervals. PVCs/VT only noted on recent stress test.    ILR was implanted 11/2019. She presented for follow-up visit 8/2020. No VT noted thus far on monitoring.    Interim Hx:  Mrs. Motley returns for yearly follow-up. She recently retired. She unfortunately lost her  and 2 brothers in the past year.     ILR monitoring over the past year  shows no arrhythmias.    My interpretation of today's in clinic ECG is normal sinus rhythm with normal intervals.    Review of Systems   Constitutional: Negative for fever and malaise/fatigue.   HENT:  Negative for congestion and sore throat.    Eyes:  Negative for blurred vision and visual disturbance.   Cardiovascular:  Negative for chest pain, dyspnea on exertion, irregular heartbeat, leg swelling, near-syncope, orthopnea, palpitations, paroxysmal nocturnal dyspnea and syncope.   Respiratory:  Negative for cough and shortness of breath.    Hematologic/Lymphatic: Negative for bleeding problem. Does not bruise/bleed easily.   Skin: Negative.    Musculoskeletal: Negative.    Gastrointestinal:  Negative for bloating, abdominal pain and constipation.   Neurological:  Negative for dizziness and focal weakness.      Objective:    Physical Exam  Vitals reviewed.   Constitutional:       General: She is not in acute distress.     Appearance: She is well-developed. She is not diaphoretic.   HENT:      Head: Normocephalic and atraumatic.   Eyes:      General:         Right eye: No discharge.         Left eye: No discharge.      Conjunctiva/sclera: Conjunctivae normal.   Neck:      Vascular: No JVD.   Cardiovascular:      Rate and Rhythm: Normal rate and regular rhythm.      Heart sounds: No murmur heard.    No friction rub. No gallop.   Pulmonary:      Effort: Pulmonary effort is normal. No respiratory distress.      Breath sounds: Normal breath sounds. No wheezing or rales.   Abdominal:      General: Bowel sounds are normal. There is no distension.      Palpations: Abdomen is soft.      Tenderness: There is no abdominal tenderness. There is no rebound.   Musculoskeletal:      Cervical back: Neck supple.   Skin:     General: Skin is warm and dry.   Neurological:      Mental Status: She is alert and oriented to person, place, and time.   Psychiatric:         Behavior: Behavior normal.         Thought Content: Thought content  normal.         Judgment: Judgment normal.         Assessment:       1. Idiopathic ventricular tachycardia    2. Essential hypertension    3. Controlled type 2 diabetes mellitus without complication, without long-term current use of insulin         Plan:       In summary, Mrs. Motley is a pleasant 71 year-old woman with obesity, hypertension and type 2 diabetes mellitus presenting for evaluation of PVCs and idiopathic VT of the same etiology on an exercise stress test performed for chest discomfort aypical for angina/ischemia. She has no evidence of ischemia on the stress test and has a preserved LVEF. She has no history of near syncope or syncope. Her PVC/VT focus is consistent with an outflow tract source, with a V2 transition ratio favoring RVOT. Discussed at initial visit prognosis and morbidity with idiopathic VT (can carry a risk of syncope however overall low risk of increased mortality) and treatment options. She desired conservative management with verapamil/ILR implant. To date no VT observed on ILR. Continue remote monitoring. She is doing well on verapamil. When her battery runs out likely will either abandon or remove and not replace it.    Continue remote ILR monitoring    RTC in 1 year.    Thank you for allowing me to participate in the care of this patient. Please do not hesitate to call me with any questions or concerns.    Gerhard Cohen MD, PhD  Cardiac Electrophysiology

## 2022-10-05 DIAGNOSIS — E11.9 TYPE 2 DIABETES MELLITUS WITHOUT COMPLICATION: ICD-10-CM

## 2022-10-07 ENCOUNTER — PATIENT OUTREACH (OUTPATIENT)
Dept: INTERNAL MEDICINE | Facility: CLINIC | Age: 72
End: 2022-10-07
Payer: MEDICARE

## 2022-10-07 DIAGNOSIS — E11.42 TYPE 2 DIABETES MELLITUS WITH DIABETIC POLYNEUROPATHY, UNSPECIFIED WHETHER LONG TERM INSULIN USE: Primary | ICD-10-CM

## 2022-10-07 DIAGNOSIS — Z12.31 ENCOUNTER FOR SCREENING MAMMOGRAM FOR BREAST CANCER: ICD-10-CM

## 2022-10-10 ENCOUNTER — PATIENT MESSAGE (OUTPATIENT)
Dept: ADMINISTRATIVE | Facility: HOSPITAL | Age: 72
End: 2022-10-10
Payer: MEDICARE

## 2022-10-17 ENCOUNTER — OFFICE VISIT (OUTPATIENT)
Dept: OPHTHALMOLOGY | Facility: CLINIC | Age: 72
End: 2022-10-17
Payer: MEDICARE

## 2022-10-17 DIAGNOSIS — H25.13 CATARACT, NUCLEAR SCLEROTIC, BOTH EYES: ICD-10-CM

## 2022-10-17 DIAGNOSIS — E11.9 TYPE 2 DIABETES MELLITUS WITHOUT OPHTHALMIC MANIFESTATIONS: ICD-10-CM

## 2022-10-17 DIAGNOSIS — H40.1131 PRIMARY OPEN ANGLE GLAUCOMA (POAG) OF BOTH EYES, MILD STAGE: Primary | ICD-10-CM

## 2022-10-17 PROCEDURE — 92012 PR EYE EXAM, EST PATIENT,INTERMED: ICD-10-PCS | Mod: S$GLB,,, | Performed by: OPHTHALMOLOGY

## 2022-10-17 PROCEDURE — 1160F PR REVIEW ALL MEDS BY PRESCRIBER/CLIN PHARMACIST DOCUMENTED: ICD-10-PCS | Mod: CPTII,S$GLB,, | Performed by: OPHTHALMOLOGY

## 2022-10-17 PROCEDURE — 1126F PR PAIN SEVERITY QUANTIFIED, NO PAIN PRESENT: ICD-10-PCS | Mod: CPTII,S$GLB,, | Performed by: OPHTHALMOLOGY

## 2022-10-17 PROCEDURE — 3288F FALL RISK ASSESSMENT DOCD: CPT | Mod: CPTII,S$GLB,, | Performed by: OPHTHALMOLOGY

## 2022-10-17 PROCEDURE — 99999 PR PBB SHADOW E&M-EST. PATIENT-LVL III: CPT | Mod: PBBFAC,,, | Performed by: OPHTHALMOLOGY

## 2022-10-17 PROCEDURE — 4010F PR ACE/ARB THEARPY RXD/TAKEN: ICD-10-PCS | Mod: CPTII,S$GLB,, | Performed by: OPHTHALMOLOGY

## 2022-10-17 PROCEDURE — 1160F RVW MEDS BY RX/DR IN RCRD: CPT | Mod: CPTII,S$GLB,, | Performed by: OPHTHALMOLOGY

## 2022-10-17 PROCEDURE — 1159F PR MEDICATION LIST DOCUMENTED IN MEDICAL RECORD: ICD-10-PCS | Mod: CPTII,S$GLB,, | Performed by: OPHTHALMOLOGY

## 2022-10-17 PROCEDURE — 99999 PR PBB SHADOW E&M-EST. PATIENT-LVL III: ICD-10-PCS | Mod: PBBFAC,,, | Performed by: OPHTHALMOLOGY

## 2022-10-17 PROCEDURE — 1126F AMNT PAIN NOTED NONE PRSNT: CPT | Mod: CPTII,S$GLB,, | Performed by: OPHTHALMOLOGY

## 2022-10-17 PROCEDURE — 3288F PR FALLS RISK ASSESSMENT DOCUMENTED: ICD-10-PCS | Mod: CPTII,S$GLB,, | Performed by: OPHTHALMOLOGY

## 2022-10-17 PROCEDURE — 1159F MED LIST DOCD IN RCRD: CPT | Mod: CPTII,S$GLB,, | Performed by: OPHTHALMOLOGY

## 2022-10-17 PROCEDURE — 92012 INTRM OPH EXAM EST PATIENT: CPT | Mod: S$GLB,,, | Performed by: OPHTHALMOLOGY

## 2022-10-17 PROCEDURE — 1101F PR PT FALLS ASSESS DOC 0-1 FALLS W/OUT INJ PAST YR: ICD-10-PCS | Mod: CPTII,S$GLB,, | Performed by: OPHTHALMOLOGY

## 2022-10-17 PROCEDURE — 1101F PT FALLS ASSESS-DOCD LE1/YR: CPT | Mod: CPTII,S$GLB,, | Performed by: OPHTHALMOLOGY

## 2022-10-17 PROCEDURE — 4010F ACE/ARB THERAPY RXD/TAKEN: CPT | Mod: CPTII,S$GLB,, | Performed by: OPHTHALMOLOGY

## 2022-10-17 RX ORDER — FESOTERODINE FUMARATE 8 MG/1
1 TABLET, EXTENDED RELEASE ORAL DAILY
COMMUNITY
End: 2023-08-23 | Stop reason: ALTCHOICE

## 2022-10-17 NOTE — PROGRESS NOTES
HPI     Glaucoma            Comments: 4 month ck and pt states no changes since last exam           Comments    DLS: 22    1. POAG OU  2. NS OU  3. Type 2 DM no DR    MEDS:  Dorzolamide TID OU  Travatan Z QHS OU          Last edited by Chhaya Chester MA on 10/17/2022  9:42 AM.            Assessment /Plan     For exam results, see Encounter Report.    Primary open angle glaucoma (POAG) of both eyes, mild stage    Cataract, nuclear sclerotic, both eyes    Type 2 diabetes mellitus without ophthalmic manifestations    1. Patient followed by Dr. Balwinder Gregg for many years. - post Mary   Pre-Mary - was seeing dr Nadine Avila   Seen by Dr. Kebede in . Seen by Dr. Hensley (retina) for ? Mild armd  in . - taking AREDs but only 1 x day - is to F/U w/ her yearly        Glaucoma (type and duration)    Primary open angle glaucoma OU. Mild.    First HVF    (outside records - fullish)    First photos   10/2021    Treatment / Drops started   About            Family history    Mother  since . H/o glaucoma surgery/drops.         Glaucoma meds    Dorzolamide TID OU. Travatan QHS OU.         H/O adverse rxn to glaucoma drops:   H/o bundle branch block. Taken off of beta blockers.         LASERS    None        GLAUCOMA SURGERIES    None        OTHER EYE SURGERIES    None        CDR    0.75/0.8        Tbase    ?? Off gtts           Tmax    - on gtts           Ttarget    18 ou - may need to be lower              HVF   - many old outside test - pt brought in - all near normal - Dr Balwinder Gregg     1 test  to   - SNS  od // ?SAD / INS  os        Gonio    +3 ou         CCT    505/ 507         OCT    1 test  to  - RNFL - dec G/TI, bord NS/NI od // bord TI  os        Disc photos    10/18/21 with Dr. Simons    - Ttmargarita      - Test done today    IOP - much better with more consistent drop use     2, NS ou - mild monitor     3. Diabetes    No BDR     Plan:   Continue dorzolamide  TID OU and Travatan QHS OU.  Avoid BB's - heart block   Monitor HVF's / and IOP and OCT going forward - to monitor for progression   + SNS od // ? INS os     Would be difficult to do slt - pt squeezes a lot with gonio     F/U 4 months: DFE OCT RNFL HVF     Pt records were given back to her - they have not been scanned into Epic - (10/18/2021)    But the important data has been - reviewed and abstracted

## 2022-10-19 ENCOUNTER — TELEPHONE (OUTPATIENT)
Dept: INTERNAL MEDICINE | Facility: CLINIC | Age: 72
End: 2022-10-19
Payer: MEDICARE

## 2022-10-19 NOTE — TELEPHONE ENCOUNTER
LVM for patient to return call to office. Please see message below for regards. Thanks.     I do not see [facility] listed on patients account. Please ask patient if this is a pharmacy of choice and inform. Thanks

## 2022-10-19 NOTE — TELEPHONE ENCOUNTER
----- Message from Esther Rodrigues sent at 10/19/2022 12:13 PM CDT -----  Name of Who is Calling:[facility]              What is the request in detail:Requesting a call back regarding medication              Can the clinic reply by [facility]:no              What Number to Call Back if not in [facility]:[phone]  Fax: [phone]

## 2022-10-19 NOTE — TELEPHONE ENCOUNTER
Patient was informed that [facility] has now been renamed to [facility]. Patient was reminded of upcoming appointment per request. No further questions and or concerns at this time. Thanks.

## 2022-10-19 NOTE — TELEPHONE ENCOUNTER
----- Message from Destiney Sullivan sent at 10/19/2022  1:41 PM CDT -----  Contact: MICHAEL PÉREZ [[id number]]  Type:  Patient Returning Call      Who Called:  MICHAEL PÉREZ [[id number]]      Who Left Message for Patient: Unsure      Does the patient know what this is regarding?: No      Would the patient rather a call back or a response via [facility]?  Call back       Best Call Back Number: [phone]      Additional Information:

## 2022-10-21 ENCOUNTER — CLINICAL SUPPORT (OUTPATIENT)
Dept: CARDIOLOGY | Facility: HOSPITAL | Age: 72
End: 2022-10-21
Payer: MEDICARE

## 2022-10-21 DIAGNOSIS — Z95.818 PRESENCE OF OTHER CARDIAC IMPLANTS AND GRAFTS: ICD-10-CM

## 2022-10-21 PROCEDURE — G2066 INTER DEVC REMOTE 30D: HCPCS | Performed by: INTERNAL MEDICINE

## 2022-10-28 ENCOUNTER — PATIENT OUTREACH (OUTPATIENT)
Dept: ADMINISTRATIVE | Facility: HOSPITAL | Age: 72
End: 2022-10-28
Payer: MEDICARE

## 2022-11-03 ENCOUNTER — IMMUNIZATION (OUTPATIENT)
Dept: PHARMACY | Facility: CLINIC | Age: 72
End: 2022-11-03
Payer: MEDICARE

## 2022-11-15 ENCOUNTER — OFFICE VISIT (OUTPATIENT)
Dept: PODIATRY | Facility: CLINIC | Age: 72
End: 2022-11-15
Payer: MEDICARE

## 2022-11-15 ENCOUNTER — LAB VISIT (OUTPATIENT)
Dept: LAB | Facility: HOSPITAL | Age: 72
End: 2022-11-15
Attending: INTERNAL MEDICINE
Payer: MEDICARE

## 2022-11-15 VITALS
HEIGHT: 64 IN | HEART RATE: 73 BPM | SYSTOLIC BLOOD PRESSURE: 149 MMHG | WEIGHT: 202 LBS | DIASTOLIC BLOOD PRESSURE: 78 MMHG | BODY MASS INDEX: 34.49 KG/M2

## 2022-11-15 DIAGNOSIS — M20.11 HALLUX VALGUS OF RIGHT FOOT: ICD-10-CM

## 2022-11-15 DIAGNOSIS — M20.42 HAMMER TOES OF BOTH FEET: ICD-10-CM

## 2022-11-15 DIAGNOSIS — E11.9 CONTROLLED TYPE 2 DIABETES MELLITUS WITHOUT COMPLICATION, WITHOUT LONG-TERM CURRENT USE OF INSULIN: Primary | ICD-10-CM

## 2022-11-15 DIAGNOSIS — E11.42 TYPE 2 DIABETES MELLITUS WITH DIABETIC POLYNEUROPATHY, UNSPECIFIED WHETHER LONG TERM INSULIN USE: ICD-10-CM

## 2022-11-15 DIAGNOSIS — M20.41 HAMMER TOES OF BOTH FEET: ICD-10-CM

## 2022-11-15 LAB
ALBUMIN/CREAT UR: 10 UG/MG (ref 0–30)
CREAT UR-MCNC: 90 MG/DL (ref 15–325)
MICROALBUMIN UR DL<=1MG/L-MCNC: 9 UG/ML

## 2022-11-15 PROCEDURE — 3288F PR FALLS RISK ASSESSMENT DOCUMENTED: ICD-10-PCS | Mod: CPTII,S$GLB,, | Performed by: PODIATRIST

## 2022-11-15 PROCEDURE — 1126F PR PAIN SEVERITY QUANTIFIED, NO PAIN PRESENT: ICD-10-PCS | Mod: CPTII,S$GLB,, | Performed by: PODIATRIST

## 2022-11-15 PROCEDURE — 3288F FALL RISK ASSESSMENT DOCD: CPT | Mod: CPTII,S$GLB,, | Performed by: PODIATRIST

## 2022-11-15 PROCEDURE — 3078F PR MOST RECENT DIASTOLIC BLOOD PRESSURE < 80 MM HG: ICD-10-PCS | Mod: CPTII,S$GLB,, | Performed by: PODIATRIST

## 2022-11-15 PROCEDURE — 3077F PR MOST RECENT SYSTOLIC BLOOD PRESSURE >= 140 MM HG: ICD-10-PCS | Mod: CPTII,S$GLB,, | Performed by: PODIATRIST

## 2022-11-15 PROCEDURE — 3008F BODY MASS INDEX DOCD: CPT | Mod: CPTII,S$GLB,, | Performed by: PODIATRIST

## 2022-11-15 PROCEDURE — 1160F RVW MEDS BY RX/DR IN RCRD: CPT | Mod: CPTII,S$GLB,, | Performed by: PODIATRIST

## 2022-11-15 PROCEDURE — 99999 PR PBB SHADOW E&M-EST. PATIENT-LVL III: ICD-10-PCS | Mod: PBBFAC,,, | Performed by: PODIATRIST

## 2022-11-15 PROCEDURE — 1160F PR REVIEW ALL MEDS BY PRESCRIBER/CLIN PHARMACIST DOCUMENTED: ICD-10-PCS | Mod: CPTII,S$GLB,, | Performed by: PODIATRIST

## 2022-11-15 PROCEDURE — 1159F PR MEDICATION LIST DOCUMENTED IN MEDICAL RECORD: ICD-10-PCS | Mod: CPTII,S$GLB,, | Performed by: PODIATRIST

## 2022-11-15 PROCEDURE — 3072F PR LOW RISK FOR RETINOPATHY: ICD-10-PCS | Mod: CPTII,S$GLB,, | Performed by: PODIATRIST

## 2022-11-15 PROCEDURE — 4010F ACE/ARB THERAPY RXD/TAKEN: CPT | Mod: CPTII,S$GLB,, | Performed by: PODIATRIST

## 2022-11-15 PROCEDURE — 1159F MED LIST DOCD IN RCRD: CPT | Mod: CPTII,S$GLB,, | Performed by: PODIATRIST

## 2022-11-15 PROCEDURE — 1126F AMNT PAIN NOTED NONE PRSNT: CPT | Mod: CPTII,S$GLB,, | Performed by: PODIATRIST

## 2022-11-15 PROCEDURE — 1101F PT FALLS ASSESS-DOCD LE1/YR: CPT | Mod: CPTII,S$GLB,, | Performed by: PODIATRIST

## 2022-11-15 PROCEDURE — 99213 PR OFFICE/OUTPT VISIT, EST, LEVL III, 20-29 MIN: ICD-10-PCS | Mod: S$GLB,,, | Performed by: PODIATRIST

## 2022-11-15 PROCEDURE — 3072F LOW RISK FOR RETINOPATHY: CPT | Mod: CPTII,S$GLB,, | Performed by: PODIATRIST

## 2022-11-15 PROCEDURE — 3077F SYST BP >= 140 MM HG: CPT | Mod: CPTII,S$GLB,, | Performed by: PODIATRIST

## 2022-11-15 PROCEDURE — 3078F DIAST BP <80 MM HG: CPT | Mod: CPTII,S$GLB,, | Performed by: PODIATRIST

## 2022-11-15 PROCEDURE — 82043 UR ALBUMIN QUANTITATIVE: CPT | Performed by: INTERNAL MEDICINE

## 2022-11-15 PROCEDURE — 3008F PR BODY MASS INDEX (BMI) DOCUMENTED: ICD-10-PCS | Mod: CPTII,S$GLB,, | Performed by: PODIATRIST

## 2022-11-15 PROCEDURE — 1101F PR PT FALLS ASSESS DOC 0-1 FALLS W/OUT INJ PAST YR: ICD-10-PCS | Mod: CPTII,S$GLB,, | Performed by: PODIATRIST

## 2022-11-15 PROCEDURE — 82570 ASSAY OF URINE CREATININE: CPT | Performed by: INTERNAL MEDICINE

## 2022-11-15 PROCEDURE — 4010F PR ACE/ARB THEARPY RXD/TAKEN: ICD-10-PCS | Mod: CPTII,S$GLB,, | Performed by: PODIATRIST

## 2022-11-15 PROCEDURE — 99999 PR PBB SHADOW E&M-EST. PATIENT-LVL III: CPT | Mod: PBBFAC,,, | Performed by: PODIATRIST

## 2022-11-15 PROCEDURE — 99213 OFFICE O/P EST LOW 20 MIN: CPT | Mod: S$GLB,,, | Performed by: PODIATRIST

## 2022-11-15 PROCEDURE — 99499 UNLISTED E&M SERVICE: CPT | Mod: HCNC,S$GLB,,

## 2022-11-20 ENCOUNTER — CLINICAL SUPPORT (OUTPATIENT)
Dept: CARDIOLOGY | Facility: HOSPITAL | Age: 72
End: 2022-11-20
Payer: MEDICARE

## 2022-11-20 DIAGNOSIS — Z95.818 PRESENCE OF OTHER CARDIAC IMPLANTS AND GRAFTS: ICD-10-CM

## 2022-11-20 PROCEDURE — G2066 INTER DEVC REMOTE 30D: HCPCS | Performed by: INTERNAL MEDICINE

## 2022-11-20 PROCEDURE — 93298 CARDIAC DEVICE CHECK - REMOTE: ICD-10-PCS | Mod: ,,, | Performed by: INTERNAL MEDICINE

## 2022-11-20 PROCEDURE — 93298 REM INTERROG DEV EVAL SCRMS: CPT | Mod: ,,, | Performed by: INTERNAL MEDICINE

## 2022-12-15 ENCOUNTER — OFFICE VISIT (OUTPATIENT)
Dept: INTERNAL MEDICINE | Facility: CLINIC | Age: 72
End: 2022-12-15
Payer: MEDICARE

## 2022-12-15 VITALS
WEIGHT: 203.69 LBS | SYSTOLIC BLOOD PRESSURE: 132 MMHG | DIASTOLIC BLOOD PRESSURE: 72 MMHG | BODY MASS INDEX: 34.77 KG/M2 | OXYGEN SATURATION: 98 % | HEIGHT: 64 IN | HEART RATE: 81 BPM

## 2022-12-15 DIAGNOSIS — E04.1 THYROID CYST: ICD-10-CM

## 2022-12-15 DIAGNOSIS — E66.9 OBESITY (BMI 30.0-34.9): ICD-10-CM

## 2022-12-15 DIAGNOSIS — Z23 NEED FOR ZOSTER VACCINE: ICD-10-CM

## 2022-12-15 DIAGNOSIS — I10 ESSENTIAL HYPERTENSION: ICD-10-CM

## 2022-12-15 DIAGNOSIS — Z00.00 ANNUAL PHYSICAL EXAM: Primary | ICD-10-CM

## 2022-12-15 DIAGNOSIS — M85.80 OSTEOPENIA, UNSPECIFIED LOCATION: ICD-10-CM

## 2022-12-15 DIAGNOSIS — Z91.89 CANDIDATE FOR STATIN THERAPY DUE TO RISK OF FUTURE CARDIOVASCULAR EVENT: ICD-10-CM

## 2022-12-15 DIAGNOSIS — E11.42 TYPE 2 DIABETES MELLITUS WITH DIABETIC POLYNEUROPATHY, UNSPECIFIED WHETHER LONG TERM INSULIN USE: ICD-10-CM

## 2022-12-15 PROCEDURE — 4010F ACE/ARB THERAPY RXD/TAKEN: CPT | Mod: CPTII,S$GLB,, | Performed by: INTERNAL MEDICINE

## 2022-12-15 PROCEDURE — 3288F PR FALLS RISK ASSESSMENT DOCUMENTED: ICD-10-PCS | Mod: CPTII,S$GLB,, | Performed by: INTERNAL MEDICINE

## 2022-12-15 PROCEDURE — 3044F PR MOST RECENT HEMOGLOBIN A1C LEVEL <7.0%: ICD-10-PCS | Mod: CPTII,S$GLB,, | Performed by: INTERNAL MEDICINE

## 2022-12-15 PROCEDURE — 3061F PR NEG MICROALBUMINURIA RESULT DOCUMENTED/REVIEW: ICD-10-PCS | Mod: CPTII,S$GLB,, | Performed by: INTERNAL MEDICINE

## 2022-12-15 PROCEDURE — 3075F SYST BP GE 130 - 139MM HG: CPT | Mod: CPTII,S$GLB,, | Performed by: INTERNAL MEDICINE

## 2022-12-15 PROCEDURE — 3008F PR BODY MASS INDEX (BMI) DOCUMENTED: ICD-10-PCS | Mod: CPTII,S$GLB,, | Performed by: INTERNAL MEDICINE

## 2022-12-15 PROCEDURE — 3008F BODY MASS INDEX DOCD: CPT | Mod: CPTII,S$GLB,, | Performed by: INTERNAL MEDICINE

## 2022-12-15 PROCEDURE — 1126F AMNT PAIN NOTED NONE PRSNT: CPT | Mod: CPTII,S$GLB,, | Performed by: INTERNAL MEDICINE

## 2022-12-15 PROCEDURE — 3075F PR MOST RECENT SYSTOLIC BLOOD PRESS GE 130-139MM HG: ICD-10-PCS | Mod: CPTII,S$GLB,, | Performed by: INTERNAL MEDICINE

## 2022-12-15 PROCEDURE — 3044F HG A1C LEVEL LT 7.0%: CPT | Mod: CPTII,S$GLB,, | Performed by: INTERNAL MEDICINE

## 2022-12-15 PROCEDURE — 1159F PR MEDICATION LIST DOCUMENTED IN MEDICAL RECORD: ICD-10-PCS | Mod: CPTII,S$GLB,, | Performed by: INTERNAL MEDICINE

## 2022-12-15 PROCEDURE — 1159F MED LIST DOCD IN RCRD: CPT | Mod: CPTII,S$GLB,, | Performed by: INTERNAL MEDICINE

## 2022-12-15 PROCEDURE — 3066F NEPHROPATHY DOC TX: CPT | Mod: CPTII,S$GLB,, | Performed by: INTERNAL MEDICINE

## 2022-12-15 PROCEDURE — 3078F DIAST BP <80 MM HG: CPT | Mod: CPTII,S$GLB,, | Performed by: INTERNAL MEDICINE

## 2022-12-15 PROCEDURE — 1160F PR REVIEW ALL MEDS BY PRESCRIBER/CLIN PHARMACIST DOCUMENTED: ICD-10-PCS | Mod: CPTII,S$GLB,, | Performed by: INTERNAL MEDICINE

## 2022-12-15 PROCEDURE — 3072F LOW RISK FOR RETINOPATHY: CPT | Mod: CPTII,S$GLB,, | Performed by: INTERNAL MEDICINE

## 2022-12-15 PROCEDURE — 4010F PR ACE/ARB THEARPY RXD/TAKEN: ICD-10-PCS | Mod: CPTII,S$GLB,, | Performed by: INTERNAL MEDICINE

## 2022-12-15 PROCEDURE — 3061F NEG MICROALBUMINURIA REV: CPT | Mod: CPTII,S$GLB,, | Performed by: INTERNAL MEDICINE

## 2022-12-15 PROCEDURE — 99999 PR PBB SHADOW E&M-EST. PATIENT-LVL IV: ICD-10-PCS | Mod: PBBFAC,,, | Performed by: INTERNAL MEDICINE

## 2022-12-15 PROCEDURE — 1126F PR PAIN SEVERITY QUANTIFIED, NO PAIN PRESENT: ICD-10-PCS | Mod: CPTII,S$GLB,, | Performed by: INTERNAL MEDICINE

## 2022-12-15 PROCEDURE — 3066F PR DOCUMENTATION OF TREATMENT FOR NEPHROPATHY: ICD-10-PCS | Mod: CPTII,S$GLB,, | Performed by: INTERNAL MEDICINE

## 2022-12-15 PROCEDURE — 99397 PER PM REEVAL EST PAT 65+ YR: CPT | Mod: S$GLB,,, | Performed by: INTERNAL MEDICINE

## 2022-12-15 PROCEDURE — 99397 PR PREVENTIVE VISIT,EST,65 & OVER: ICD-10-PCS | Mod: S$GLB,,, | Performed by: INTERNAL MEDICINE

## 2022-12-15 PROCEDURE — 3072F PR LOW RISK FOR RETINOPATHY: ICD-10-PCS | Mod: CPTII,S$GLB,, | Performed by: INTERNAL MEDICINE

## 2022-12-15 PROCEDURE — 3078F PR MOST RECENT DIASTOLIC BLOOD PRESSURE < 80 MM HG: ICD-10-PCS | Mod: CPTII,S$GLB,, | Performed by: INTERNAL MEDICINE

## 2022-12-15 PROCEDURE — 3288F FALL RISK ASSESSMENT DOCD: CPT | Mod: CPTII,S$GLB,, | Performed by: INTERNAL MEDICINE

## 2022-12-15 PROCEDURE — 1160F RVW MEDS BY RX/DR IN RCRD: CPT | Mod: CPTII,S$GLB,, | Performed by: INTERNAL MEDICINE

## 2022-12-15 PROCEDURE — 1101F PT FALLS ASSESS-DOCD LE1/YR: CPT | Mod: CPTII,S$GLB,, | Performed by: INTERNAL MEDICINE

## 2022-12-15 PROCEDURE — 1101F PR PT FALLS ASSESS DOC 0-1 FALLS W/OUT INJ PAST YR: ICD-10-PCS | Mod: CPTII,S$GLB,, | Performed by: INTERNAL MEDICINE

## 2022-12-15 PROCEDURE — 99999 PR PBB SHADOW E&M-EST. PATIENT-LVL IV: CPT | Mod: PBBFAC,,, | Performed by: INTERNAL MEDICINE

## 2022-12-15 RX ORDER — HYDROCHLOROTHIAZIDE 25 MG/1
25 TABLET ORAL DAILY
Qty: 90 TABLET | Refills: 3 | Status: SHIPPED | OUTPATIENT
Start: 2022-12-15 | End: 2024-01-11

## 2022-12-15 NOTE — PROGRESS NOTES
Subjective:       Patient ID: Jennifer Motley is a 72 y.o. female who  has a past medical history of Cataract, Diabetes mellitus type II, controlled, Glaucoma, Hypertension, Idiopathic ventricular tachycardia, Obese, Ocular hypertension, Overactive bladder, and Thyroid cyst.    Chief Complaint: Hypertension and Obesity     History was obtained from the patient and supplemented through chart review  -follows c Ophthalmology for glaucoma, DM.  -OBGYN at [facility] Dr. Natalie Balderas.    Was a  at [employer].  Retired.    HPI    She will send in OSH labs from 10/2022.    HTN:    History of Vtach as below.  Tried to switch Verapamil to Toprol 50 d/t constipation, but Toprol 50 caused her to feel out of breath.  Stopped Toprol with resolution of symptoms.    LEFTY eval as below.    Pt's BP is controlled. On verapamil 180 qHS, HCTZ 25 qAM, valsartan 160 qAM.  Takes a.m. meds around 8:00 a.m..  Tolerating meds well.    Home BP:  Home cuff accurate.      OSH CMP WNL 9-2021. Cr 0.75.  Normal GFR.     DM2 is controlled.  Currently pt is taking metformin 500 daily. H/o constipation.  No loose stool, nausea.  Sees OSH endocrinologist, Dr. Ravi Estrada in [place], q4 mo. She declined injectables.     OSH labs  A1c 6.4. Had OSH labs c Endo and has f/u scheduled 2/2022.    Exercise: Challenging herself to exercising 30 minutes daily. Has only missed 2 days in December. Doing it with her sister.  Doing water aerobics on weekdays and treadmill on the weekends.  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    Diet: not much red meat.  Joined [employer] and discussed portions, decreasing starches. Hasn't joined meetings lately.    Normal microalbumin creatinine ratio.  Not on an ACE/ARB.   Retinal exams: 10/2022.  Sees Ophthalmology for glaucoma.  No retinopathy.  Foot exams:  11/2022  Lab Results   Component Value Date/Time    HGBA1C 6.3 (H) 11/15/2022 09:45 AM    HGBA1C 6.5 (H) 11/15/2021 09:25 AM    HGBA1C 6.3 (H) 05/13/2021  08:28 AM     No results found for: [id number]    Candidate for statin therapy due to increased cardiovascular risk:    Diet as above.  Not on statin. +DM.  She declines statin.    OSH labs : , HDL 70, .   Lab Results   Component Value Date    LDLCALC 105.0 11/16/2020     The 10-year ASCVD risk score (Chris TATUM, et al., 2019) is: 27.6%    Values used to calculate the score:      Age: 72 years      Sex: Female      Is Non- : Yes      Diabetic: Yes      Tobacco smoker: No      Systolic Blood Pressure: 132 mmHg      Is BP treated: Yes      HDL Cholesterol: 59 mg/dL      Total Cholesterol: 186 mg/dL    Obesity:  On metformin.  Exercise as above.  BMI Readings from Last 3 Encounters:   12/15/22 34.97 kg/m²   11/15/22 34.67 kg/m²   10/04/22 34.74 kg/m²      Thyroid cyst:  Followed by OSH endocrinologist at [facility], Dr. Estrada. Previous thyroid U/S  with subcentimeter nodules.  Repeat U/S ; no report. OSH TSH WNL 3-2021.  Lab Results   Component Value Date    TSH 0.847 11/16/2020     Osteopenia, Vitamin D deficiency:   OSH DXA at [facility].  DXA  BMD low FRAX score.  Is taking Ca/vit D supplements.  OSH Vit D 33 (03/23/2022)  Exercise:  As above.  Lab Results   Component Value Date    [id number] 46 11/16/2020               Not addressed today.  H/o Vtach:  Had stress d/t deaths in the family.  Stress TTE  with normal EF, no ischemia, but did develop PVCs, VT.  Was asx during the stress test.  ILR was implanted  w/o VT.    On verapamil. Follows with Cards/EP annually and continuing to monitor.  Has device checks. When battery runs out, will either abandon or remove s replacement.  Controlled.  Continue verapamil.  Following with EP/Cardiology.  Recommend LEFTY eval as below.    Snoring:    No apnea. Sometimes with daytime fatigue.  Feels refreshed after adequate sleep.  +HTN, Obesity.  Saw sleep clinic.  Did not schedule PSG d/t change in insurance.   "  Advised to schedule sleep study d/t HTN and Vtach.     Overactive bladder:  On Trospium.  Sees [facility], Dr. Sampson.    Review of Systems   Constitutional:  Negative for activity change and appetite change.   Respiratory:  Negative for wheezing.    Cardiovascular:  Negative for leg swelling.   Musculoskeletal:  Negative for gait problem.   Skin:  Negative for rash and wound.   Psychiatric/Behavioral:  Negative for confusion. The patient is not nervous/anxious.        I personally reviewed Past Medical History, Past Surgical History, Social History, and Family History.    Objective:      Vitals:    12/15/22 0858 12/15/22 0914   BP: (!) 142/80 132/72   Pulse: 81    SpO2: 98%    Weight: 92.4 kg (203 lb 11.3 oz)    Height: 5' 4" (1.626 m)         Physical Exam  Constitutional:       General: She is not in acute distress.     Appearance: She is not diaphoretic.   HENT:      Head: Normocephalic and atraumatic.   Eyes:      General: No scleral icterus.        Right eye: No discharge.         Left eye: No discharge.   Cardiovascular:      Rate and Rhythm: Normal rate and regular rhythm.      Heart sounds: Normal heart sounds. No murmur heard.  Pulmonary:      Effort: Pulmonary effort is normal. No respiratory distress.      Breath sounds: Normal breath sounds. No wheezing.   Abdominal:      General: Bowel sounds are normal. There is no distension.      Palpations: Abdomen is soft.      Tenderness: There is no abdominal tenderness.   Musculoskeletal:         General: No deformity.      Right lower leg: No edema.      Left lower leg: No edema.   Skin:     General: Skin is warm and dry.      Findings: No erythema.   Neurological:      Mental Status: She is alert.      Gait: Gait normal.   Psychiatric:         Behavior: Behavior normal.         Lab Results   Component Value Date    WBC 5.63 11/16/2020    HGB 12.4 11/16/2020    HCT 39.7 11/16/2020     11/16/2020    CHOL 186 11/16/2020    TRIG 110 11/16/2020    HDL " 59 11/16/2020    ALT 25 11/16/2020    AST 20 11/16/2020     11/16/2020    K 4.2 11/16/2020     11/16/2020    CREATININE 0.8 11/16/2020    BUN 16 11/16/2020    CO2 29 11/16/2020    TSH 0.847 11/16/2020    HGBA1C 6.3 (H) 11/15/2022       The 10-year ASCVD risk score (Chris TATUM, et al., 2019) is: 27.6%    Values used to calculate the score:      Age: 72 years      Sex: Female      Is Non- : Yes      Diabetic: Yes      Tobacco smoker: No      Systolic Blood Pressure: 132 mmHg      Is BP treated: Yes      HDL Cholesterol: 59 mg/dL      Total Cholesterol: 186 mg/dL    (Imaging have been independently reviewed)  None    OSH MMG at [facility] 1/19/22 normal, dense breast tissue. Repeat in 1 year.    Assessment:       1. Annual physical exam    2. Essential hypertension    3. Type 2 diabetes mellitus with diabetic polyneuropathy, unspecified whether long term insulin use    4. Candidate for statin therapy due to risk of future cardiovascular event    5. Obesity (BMI 30.0-34.9)    6. Thyroid cyst    7. Osteopenia, unspecified location    8. Need for zoster vaccine            Plan:       Jennifer was seen today for hypertension and obesity.    Diagnoses and all orders for this visit:    Annual physical exam  Comments:  She will send in OSH labs from 10/2022.    Essential hypertension  Comments:  Controlled. Cont current meds.  Consider Aldactone d/t hirsutism. Monitor home BP.   Orders:  -     hydroCHLOROthiazide (HYDRODIURIL) 25 MG tablet; Take 1 tablet (25 mg total) by mouth once daily.    Type 2 diabetes mellitus with diabetic polyneuropathy, unspecified whether long term insulin use  Comments:  A1C controlled. Cont metformin 500 daily. Encouraged continued dietary changes, exercise. Has A1cs c OSH Endo; she will send in lab results. A1C q6mo.    Candidate for statin therapy due to risk of future cardiovascular event  Comments:  FLP controlled, but ASCVD elevated. +DM. Previously discussed R/B on  statin and she declined.  Endocrine is monitoring FLP; she will send records.     Obesity (BMI 30.0-34.9)  Comments:  Stable.  Cont metformin. Discussed well balanced diet. Continue exercise. Previously declined injectables.    Thyroid cyst  Comments:  OSH endocrinologist is monitoring. She will send in labs.    Osteopenia, unspecified location  Comments:  Low FRAX. Cont calcium, vitamin-D supplement.  Doing well with exercise.  Following with endocrinology.    Need for zoster vaccine  Comments:  Advised to obtain vaccine at Pharmacy.           Side effects of medication(s) were discussed in detail and patient voiced understanding.  Patient will call back for any issues or complications.     Health Maintenance   Topic Date Due    Mammogram  07/19/2022    Lipid Panel  12/20/2022    Hemoglobin A1c  05/15/2023    Eye Exam  10/17/2023    Foot Exam  11/15/2023    DEXA Scan  12/18/2023    TETANUS VACCINE  10/11/2030    Hepatitis C Screening  Completed    Low Dose Statin  Discontinued     RTC in 6 month(s) or sooner PRN for HTN, DM.

## 2022-12-19 ENCOUNTER — HOSPITAL ENCOUNTER (OUTPATIENT)
Dept: RADIOLOGY | Facility: OTHER | Age: 72
Discharge: HOME | End: 2022-12-19
Attending: INTERNAL MEDICINE
Payer: MEDICARE

## 2022-12-19 DIAGNOSIS — Z12.31 ENCOUNTER FOR SCREENING MAMMOGRAM FOR BREAST CANCER: ICD-10-CM

## 2022-12-19 PROCEDURE — 77067 SCR MAMMO BI INCL CAD: CPT | Mod: 26,,, | Performed by: RADIOLOGY

## 2022-12-19 PROCEDURE — 77063 BREAST TOMOSYNTHESIS BI: CPT | Mod: TC

## 2022-12-19 PROCEDURE — 77063 MAMMO DIGITAL SCREENING BILAT WITH TOMO: ICD-10-PCS | Mod: 26,,, | Performed by: RADIOLOGY

## 2022-12-19 PROCEDURE — 77067 SCR MAMMO BI INCL CAD: CPT | Mod: TC

## 2022-12-19 PROCEDURE — 77067 MAMMO DIGITAL SCREENING BILAT WITH TOMO: ICD-10-PCS | Mod: 26,,, | Performed by: RADIOLOGY

## 2022-12-19 PROCEDURE — 77063 BREAST TOMOSYNTHESIS BI: CPT | Mod: 26,,, | Performed by: RADIOLOGY

## 2022-12-20 ENCOUNTER — CLINICAL SUPPORT (OUTPATIENT)
Dept: CARDIOLOGY | Facility: HOSPITAL | Age: 72
End: 2022-12-20
Payer: MEDICARE

## 2022-12-20 DIAGNOSIS — Z95.818 PRESENCE OF OTHER CARDIAC IMPLANTS AND GRAFTS: ICD-10-CM

## 2022-12-20 PROCEDURE — G2066 INTER DEVC REMOTE 30D: HCPCS | Performed by: INTERNAL MEDICINE

## 2022-12-29 DIAGNOSIS — E11.9 TYPE 2 DIABETES MELLITUS WITHOUT COMPLICATION: ICD-10-CM

## 2023-01-09 ENCOUNTER — PES CALL (OUTPATIENT)
Dept: ADMINISTRATIVE | Facility: CLINIC | Age: 73
End: 2023-01-09
Payer: MEDICARE

## 2023-01-16 NOTE — PROGRESS NOTES
Subjective:      Patient ID: Jennifer Motley is a 72 y.o. female.    Chief Complaint: Diabetic Foot Exam (Foot Exam/PCP Tahira Mistry MD  05/18/22)    Jennifer is a 72 y.o. female who presents to the clinic upon referral from Dr. Ruba reynoso. provider found  for evaluation and treatment of diabetic feet. Jennifer has a past medical history of Cataract, Diabetes mellitus type II, controlled, Glaucoma, Hypertension, Idiopathic ventricular tachycardia, Obese, Ocular hypertension, Overactive bladder, and Thyroid cyst. Patient relates no major problem with feet. Only complaints today consist of Diabetes, increased risk amputation needing evaluation/management/optomization of foot care.  Patient notes no changes in medical history since last visit.  She has no pedal complaints today.  She checks her blood glucose daily and is well-controlled.  Patient denies symptoms of neuropathy.    PCP: Tahira Mistry MD    Date Last Seen by PCP:   Chief Complaint   Patient presents with    Diabetic Foot Exam     Foot Exam/PCP Tahira Mistry MD  05/18/22         Current shoe gear: Casual shoes    Hemoglobin A1C   Date Value Ref Range Status   11/15/2021 6.5 (H) 4.0 - 5.6 % Final     Comment:     ADA Screening Guidelines:  5.7-6.4%  Consistent with prediabetes  >or=6.5%  Consistent with diabetes    High levels of fetal hemoglobin interfere with the HbA1C  assay. Heterozygous hemoglobin variants (HbS, HgC, etc)do  not significantly interfere with this assay.   However, presence of multiple variants may affect accuracy.     05/13/2021 6.3 (H) 4.0 - 5.6 % Final     Comment:     ADA Screening Guidelines:  5.7-6.4%  Consistent with prediabetes  >or=6.5%  Consistent with diabetes    High levels of fetal hemoglobin interfere with the HbA1C  assay. Heterozygous hemoglobin variants (HbS, HgC, etc)do  not significantly interfere with this assay.   However, presence of multiple variants may affect accuracy.     11/16/2020 6.5 (H) 4.0 - 5.6 % Final     Comment:      ADA Screening Guidelines:  5.7-6.4%  Consistent with prediabetes  >or=6.5%  Consistent with diabetes  High levels of fetal hemoglobin interfere with the HbA1C  assay. Heterozygous hemoglobin variants (HbS, HgC, etc)do  not significantly interfere with this assay.   However, presence of multiple variants may affect accuracy.             Review of Systems   Constitutional: Negative for chills, diaphoresis, fever, malaise/fatigue and night sweats.   Cardiovascular:  Negative for claudication, cyanosis, leg swelling and syncope.   Skin:  Negative for color change, dry skin, nail changes, rash, suspicious lesions and unusual hair distribution.   Musculoskeletal:  Negative for falls, joint pain, joint swelling, muscle cramps, muscle weakness and stiffness.   Gastrointestinal:  Negative for constipation, diarrhea, nausea and vomiting.   Neurological:  Positive for sensory change. Negative for brief paralysis, disturbances in coordination, focal weakness, numbness, paresthesias and tremors.         Objective:      Physical Exam  Constitutional:       General: She is not in acute distress.     Appearance: She is well-developed. She is not diaphoretic.   Cardiovascular:      Pulses:           Popliteal pulses are 2+ on the right side and 2+ on the left side.        Dorsalis pedis pulses are 2+ on the right side and 2+ on the left side.        Posterior tibial pulses are 2+ on the right side and 2+ on the left side.      Comments: Capillary refill 3 seconds all toes/distal feet, all toes/both feet warm to touch.      Negative lymphadenopathy bilateral popliteal fossa and tarsal tunnel.      Negavie lower extremity edema bilateral.    Musculoskeletal:      Right ankle: No swelling, deformity, ecchymosis or lacerations. Normal range of motion. Normal pulse.      Right Achilles Tendon: Normal. No defects. Yepez's test negative.      Comments: Bunion/hallux valgus right without symptom.    Patient has hammertoes of digits    Detail Level: Zone  5 bilateral               partially reducible without symptom today.    Otherwise, Normal angle, base, station of gait. All ten toes without clubbing, cyanosis, or signs of ischemia.  No pain to palpation bilateral lower extremities.  Range of motion, stability, muscle strength, and muscle tone normal bilateral feet and legs.    Feet:      Right foot:      Skin integrity: Skin integrity normal.      Left foot:      Skin integrity: Skin integrity normal.   Lymphadenopathy:      Lower Body: No right inguinal adenopathy. No left inguinal adenopathy.      Comments: Negative lymphadenopathy bilateral popliteal fossa and tarsal tunnel.    Negative lymphangitic streaking bilateral feet/ankles/legs.   Skin:     General: Skin is warm and dry.      Capillary Refill: Capillary refill takes 2 to 3 seconds.      Coloration: Skin is not pale.      Findings: No abrasion, bruising, burn, ecchymosis, erythema, laceration, lesion or rash.      Nails: There is no clubbing.      Comments:   Skin is normal age and health appropriate color, turgor, texture, and temperature bilateral lower extremities without ulceration, hyperpigmentation, discoloration, masses nodules or cords palpated.  No ecchymosis, erythema, edema, or cardinal signs of infection bilateral lower extremities.       Neurological:      Mental Status: She is alert and oriented to person, place, and time.      Sensory: Sensory deficit present.      Motor: No tremor, atrophy or abnormal muscle tone.      Gait: Gait normal.      Deep Tendon Reflexes:      Reflex Scores:       Patellar reflexes are 2+ on the right side and 2+ on the left side.       Achilles reflexes are 2+ on the right side and 2+ on the left side.     Comments: Negative tinel sign to percussion sural, superficial peroneal, deep peroneal, saphenous, and posterior tibial nerves right and left ankles and feet.    Decreased/absent vibratory sensation bilateral feet to 128Hz tuning fork.    No LOPS with SWMF     Psychiatric:         Behavior: Behavior is cooperative.             Assessment:       Encounter Diagnoses   Name Primary?    Controlled type 2 diabetes mellitus without complication, without long-term current use of insulin Yes    Hammer toes of both feet     Hallux valgus of right foot          Plan:       Jennifer was seen today for diabetic foot exam.    Diagnoses and all orders for this visit:    Controlled type 2 diabetes mellitus without complication, without long-term current use of insulin    Hammer toes of both feet    Hallux valgus of right foot    I counseled the patient on her conditions, their implications and medical management.    Shoe inspection. Diabetic Foot Education. Patient reminded of the importance of good nutrition and blood sugar control to help prevent podiatric complications of diabetes. Patient instructed on proper foot hygeine. We discussed wearing proper shoe gear, daily foot inspections, never walking without protective shoe gear, never putting sharp instruments to feet, routine podiatric this at least annual.      Discussed conservative treatment with shoes of adequate dimensions, material, and style to alleviate symptoms and delay or prevent surgical intervention.          Follow up in about 1 year (around 11/15/2023).

## 2023-01-17 ENCOUNTER — PATIENT OUTREACH (OUTPATIENT)
Dept: ADMINISTRATIVE | Facility: HOSPITAL | Age: 73
End: 2023-01-17
Payer: MEDICARE

## 2023-01-18 ENCOUNTER — PATIENT MESSAGE (OUTPATIENT)
Dept: ADMINISTRATIVE | Facility: HOSPITAL | Age: 73
End: 2023-01-18
Payer: MEDICARE

## 2023-01-19 ENCOUNTER — CLINICAL SUPPORT (OUTPATIENT)
Dept: CARDIOLOGY | Facility: HOSPITAL | Age: 73
End: 2023-01-19
Payer: MEDICARE

## 2023-01-19 DIAGNOSIS — Z95.818 PRESENCE OF OTHER CARDIAC IMPLANTS AND GRAFTS: ICD-10-CM

## 2023-01-19 PROCEDURE — G2066 INTER DEVC REMOTE 30D: HCPCS | Mod: HCNC | Performed by: INTERNAL MEDICINE

## 2023-01-19 PROCEDURE — 93298 CARDIAC DEVICE CHECK - REMOTE: ICD-10-PCS | Mod: HCNC,,, | Performed by: INTERNAL MEDICINE

## 2023-01-19 PROCEDURE — 93298 REM INTERROG DEV EVAL SCRMS: CPT | Mod: HCNC,,, | Performed by: INTERNAL MEDICINE

## 2023-01-23 NOTE — PATIENT INSTRUCTIONS
Heart Disease Education    The heart beats 60 to 100 times per minute, 24 hours a day. This equals almost 1000,000 times a day. It pumps blood with oxygen and nutrients to the tissues and organs of the body. But the heart is a muscle and needs its own supply of blood. Blood flow to the heart is supplied by the coronary arteries. Coronary artery disease (atherosclerosis) is a result of cholesterol, saturated fat, and calcium deposits (plaques) that build up inside the walls. This causes inflammation within the coronary arteries. These plaques narrow the artery and reduce blood flow to the heart muscle. The reduction in blood flow to the heart muscle decreases oxygen supply to the heart. If the narrowing is significant enough, the oxygen supply to one or more regions of the heart can be temporarily or permanently shut down. This can cause chest pain, and possibly death of heart tissue (heart attack).  Types of chest pain  Angina is the name for pain in the heart muscle. Angina is a warning sign of serious heart disease. When untreated it can lead to a heart attack, also known as acute myocardial infarction, or AMI. Angina occurs when there is not enough blood and oxygen flowing to the heart for the amount of work it is doing. This most often happens during physical exertion, when the heart is working hardest. It is usually relieved by rest or nitroglycerin. Angina may also occur after a large meal when extra blood is sent to the digestive organs and less goes to the heart. In the case of advanced or unstable heart disease, angina can occur at rest or awaken you from sleep. Angina usually lasts from a few minutes up to 20 minutes or more. When treated early, the effects of angina can be reversed without permanent damage to the heart. Angina is a serious condition and needs to be evaluated by a medical professional immediately.  There are two types of angina -- stable and unstable:  · Stable angina usually occurs  with a predictable level of activity. Being stable, its character, severity, and occurrence do not change much over time. It usually starts with activity, and resolves with rest or taking your medicine as instructed by your doctor. The symptoms usually do not last long.  · Unstable angina changes or gets worse over time. It is different from whatever you are used to. It may feel different or worse, begin without cause, occur with exercise or exertion, wake you up from sleep, and last longer. It may not respond in the same way as it does when you take your usual medicines for an attack. This type of angina can be a warning sign of an impending heart attack.     A heart attack is usually the result of a blood clot that suddenly forms in a coronary artery that has been narrowed with plaque. When this occurs, blood flow may be cut off to a part of the heart muscle, causing the cells to die. This weakens the pumping action of the heart, which affects the delivery of blood to all the other organs in the body including the brain. This damage is not reversible. However, early treatment can limit the amount of damage.  The pain you feel with angina and a heart attack may have a similar quality. However, it is usually different in intensity and duration. Here are some typical descriptions of a heart attack:  · It is most often experienced as a squeezing, crushing, pressure-like sensation in the center of the chest.  · It is sometimes described as something heavy sitting on my chest.  · It may feel more like a bad case of indigestion.  · The pain may spread from the chest to the arm, shoulder, throat or jaw.  · Sometimes the pain is not felt in the chest at all, but only in the arm, shoulder, throat or jaw.  · There may also be nausea, vomiting, dizziness or light-headedness, sweating and trouble breathing.  · Palpitations, or your heart beating rapidly  · A new, irregular heart beat  · Unexplained weakness  You may not be  "able to tell the difference between "bad" angina and a heart attack at home. Seek help if your symptoms are different than usual. Do not be in denial or just try to "tough it out."  Call 911  This is the fastest and safest way to get to the emergency department. The paramedics can also start treatment on the way to the hospital, saving valuable time for your heart.  · If the angina gets worse, if it continues, or if it stops and returns, call 911 immediately. Do not delay. You may be having a heart attack.  · After you call 911, take a second tablet or spray unless instructed otherwise. When repeating doses, sit down if possible, because it can make you feel lightheaded or dizzy. Wait another 5 minutes. If the angina still does not go away, take a third tablet or spray. Do not take more than 3 tablets or sprays within 15 minutes. Stay on the phone with 911 for further instruction.  · Your healthcare provider may give you slightly different instructions than those above. If so, follow them carefully.  Do not wait until symptoms become severe to call 911.  Other reasons to call 911 include:  · Trouble breathing  · Feeling lightheaded, faint, or dizzy  · Rapid heart beat  · Slower than usual heart rate compared to your normal  · Angina with weakness, dizziness, fainting, heavy sweating, nausea, or vomiting  · Extreme drowsiness, confusion  · Weakness of an arm or leg or one side of the face  · Difficulty with speech or vision  When to seek medical care  Remember, the signs and symptoms of a heart attack are not always like they are on TV. Sometimes they are not so obvious. You may only feel weak, or just not right. If it is not clear or if you have any doubt, call for advice.  · Seek help if there is a change in the type of pain, if it feels different, or if your symptoms are mild.  · Do not drive yourself. Have someone else drive you. If no one can drive, call 911.  · Do not delay. Fast diagnosis and treatment can " "prevent or limit the amount of heart damage during a heart attack.  · Do not go to your doctor's office or a clinic as they may not be able to provide all the testing and treatment required for this condition.  · If your doctor has given you medicine to take when symptoms occur, take them but don't delay getting help trying to locate medicines.  What happens in the emergency department  The emergency department is connected to your local emergency medical system (EMS) through 911. That's why during a cardiac emergency, calling 911 is the fastest way to get help. The goal of the emergency department is to rapidly screen, evaluate, and treat people.  Once you are there, an electrocardiogram (ECG or heart tracing) will be done. Blood samples may be taken to look for the presence of heart enzymes that leak from damaged heart cells and show if a heart attack is occurring. You will often be evaluated by a heart specialist (cardiologist) who decides the best course of action. In the case of severe angina or early heart attack, and depending on the circumstances, powerful "clot busting" medicines can be used to dissolve blood clots in the coronary artery. In other cases, you may be taken to a cardiac catheterization lab. Here, a tiny balloon-tipped catheter is advanced through blood vessels to the heart. There the balloon is inflated pushing open the blood vessel restoring blood flow.  Risk factors for heart disease  Risk factors for heart disease are a combination of genetic and lifestyle. Many risk factors work by either directly or indirectly damaging the blood vessels of the heart, or by increasing the risk of forming blood or cholesterol clots, which then clog up and block the arteries.     Examples of physical lifestyle risk factors:  · Cigarette smoking  · High blood pressure  · High blood cholesterol  · Use of stimulant drugs such as cocaine, crack, and amphetamines  · Eating a high-fat, high-cholesterol " meal  · Diabetes   · Obesity which increases risk for diabetes and high blood pressure  · Lack of regular physical activity     Examples of emotional lifestyle factors:  · Chronic high stress levels release stress hormones. These raise blood pressure and cholesterol level and makes blood clot more easily.  · Held-in anger, hostile or cynical attitude  · Social and emotional isolation, lack of intimacy  · Loss of relationship  · Depression  Other factors that increase the risk of heart attack that you cannot control :  · Age. The older you get beyond 40, the greater is your risk of significant coronary artery disease.  · Gender. More men than women get heart disease; but once past menopause, women who are not taking estrogen replacement have the same risk as men for a heart attack.  · Family history. If your mother, father, brother or sister has coronary artery disease, your risk of having it is higher than a person your age without this family history.  What can you do to decrease your risk  To reduce your risk of heart disease:  · Get regular checkups with your doctor.  · Take your medicines for blood pressure, cholesterol or diabetes as directed.  · Watch your diet. Eat a heart healthy diet choosing fresh foods, less salt, cholesterol, and fat  · Stop smoking. Get help if needed.  · Get regular exercise.  · Manage stress.  · Carry a list of medicines and doses in your wallet.  Date Last Reviewed: 12/30/2015  © 9448-8345 [employer]. [address] [place] [address], [place] [address]. All rights reserved. This information is not intended as a substitute for professional medical care. Always follow your healthcare professional's instructions.         Arava Pregnancy And Lactation Text: This medication is Pregnancy Category X and is absolutely contraindicated during pregnancy. It is unknown if it is excreted in breast milk.

## 2023-02-02 ENCOUNTER — IMMUNIZATION (OUTPATIENT)
Dept: INTERNAL MEDICINE | Facility: CLINIC | Age: 73
End: 2023-02-02
Payer: MEDICARE

## 2023-02-02 ENCOUNTER — OFFICE VISIT (OUTPATIENT)
Dept: INTERNAL MEDICINE | Facility: CLINIC | Age: 73
End: 2023-02-02
Payer: MEDICARE

## 2023-02-02 VITALS
SYSTOLIC BLOOD PRESSURE: 116 MMHG | DIASTOLIC BLOOD PRESSURE: 62 MMHG | HEART RATE: 63 BPM | BODY MASS INDEX: 36.41 KG/M2 | OXYGEN SATURATION: 98 % | HEIGHT: 63 IN | WEIGHT: 205.5 LBS

## 2023-02-02 DIAGNOSIS — H40.059 OCULAR HYPERTENSION, UNSPECIFIED LATERALITY: ICD-10-CM

## 2023-02-02 DIAGNOSIS — E66.01 SEVERE OBESITY (BMI 35.0-39.9) WITH COMORBIDITY: ICD-10-CM

## 2023-02-02 DIAGNOSIS — I47.29 IDIOPATHIC VENTRICULAR TACHYCARDIA: Chronic | ICD-10-CM

## 2023-02-02 DIAGNOSIS — Z00.00 ENCOUNTER FOR PREVENTIVE HEALTH EXAMINATION: Primary | ICD-10-CM

## 2023-02-02 DIAGNOSIS — I10 ESSENTIAL HYPERTENSION: ICD-10-CM

## 2023-02-02 DIAGNOSIS — Z80.41 FAMILY HISTORY OF OVARIAN CANCER: ICD-10-CM

## 2023-02-02 DIAGNOSIS — M85.80 OSTEOPENIA, UNSPECIFIED LOCATION: ICD-10-CM

## 2023-02-02 DIAGNOSIS — Z91.89 CANDIDATE FOR STATIN THERAPY DUE TO RISK OF FUTURE CARDIOVASCULAR EVENT: ICD-10-CM

## 2023-02-02 DIAGNOSIS — E11.42 TYPE 2 DIABETES MELLITUS WITH DIABETIC POLYNEUROPATHY, UNSPECIFIED WHETHER LONG TERM INSULIN USE: ICD-10-CM

## 2023-02-02 DIAGNOSIS — E11.9 CONTROLLED TYPE 2 DIABETES MELLITUS WITHOUT COMPLICATION, WITHOUT LONG-TERM CURRENT USE OF INSULIN: ICD-10-CM

## 2023-02-02 DIAGNOSIS — I47.20 VENTRICULAR TACHYCARDIA: ICD-10-CM

## 2023-02-02 DIAGNOSIS — N32.81 OVERACTIVE BLADDER: ICD-10-CM

## 2023-02-02 DIAGNOSIS — E04.1 THYROID CYST: ICD-10-CM

## 2023-02-02 DIAGNOSIS — E66.9 OBESITY (BMI 30.0-34.9): ICD-10-CM

## 2023-02-02 DIAGNOSIS — Z23 NEED FOR VACCINATION: Primary | ICD-10-CM

## 2023-02-02 PROCEDURE — 1159F MED LIST DOCD IN RCRD: CPT | Mod: HCNC,CPTII,S$GLB, | Performed by: NURSE PRACTITIONER

## 2023-02-02 PROCEDURE — 3078F PR MOST RECENT DIASTOLIC BLOOD PRESSURE < 80 MM HG: ICD-10-PCS | Mod: HCNC,CPTII,S$GLB, | Performed by: NURSE PRACTITIONER

## 2023-02-02 PROCEDURE — 1159F PR MEDICATION LIST DOCUMENTED IN MEDICAL RECORD: ICD-10-PCS | Mod: HCNC,CPTII,S$GLB, | Performed by: NURSE PRACTITIONER

## 2023-02-02 PROCEDURE — 99999 PR PBB SHADOW E&M-EST. PATIENT-LVL IV: ICD-10-PCS | Mod: PBBFAC,HCNC,, | Performed by: NURSE PRACTITIONER

## 2023-02-02 PROCEDURE — 1101F PT FALLS ASSESS-DOCD LE1/YR: CPT | Mod: HCNC,CPTII,S$GLB, | Performed by: NURSE PRACTITIONER

## 2023-02-02 PROCEDURE — 91312 COVID-19, MRNA, LNP-S, BIVALENT BOOSTER, PF, 30 MCG/0.3 ML DOSE: ICD-10-PCS | Mod: HCNC,S$GLB,, | Performed by: INTERNAL MEDICINE

## 2023-02-02 PROCEDURE — 1126F PR PAIN SEVERITY QUANTIFIED, NO PAIN PRESENT: ICD-10-PCS | Mod: HCNC,CPTII,S$GLB, | Performed by: NURSE PRACTITIONER

## 2023-02-02 PROCEDURE — 3078F DIAST BP <80 MM HG: CPT | Mod: HCNC,CPTII,S$GLB, | Performed by: NURSE PRACTITIONER

## 2023-02-02 PROCEDURE — 99999 PR PBB SHADOW E&M-EST. PATIENT-LVL IV: CPT | Mod: PBBFAC,HCNC,, | Performed by: NURSE PRACTITIONER

## 2023-02-02 PROCEDURE — 1170F FXNL STATUS ASSESSED: CPT | Mod: HCNC,CPTII,S$GLB, | Performed by: NURSE PRACTITIONER

## 2023-02-02 PROCEDURE — 0124A COVID-19, MRNA, LNP-S, BIVALENT BOOSTER, PF, 30 MCG/0.3 ML DOSE: CPT | Mod: HCNC,PBBFAC | Performed by: INTERNAL MEDICINE

## 2023-02-02 PROCEDURE — 1126F AMNT PAIN NOTED NONE PRSNT: CPT | Mod: HCNC,CPTII,S$GLB, | Performed by: NURSE PRACTITIONER

## 2023-02-02 PROCEDURE — 3288F FALL RISK ASSESSMENT DOCD: CPT | Mod: HCNC,CPTII,S$GLB, | Performed by: NURSE PRACTITIONER

## 2023-02-02 PROCEDURE — 1160F PR REVIEW ALL MEDS BY PRESCRIBER/CLIN PHARMACIST DOCUMENTED: ICD-10-PCS | Mod: HCNC,CPTII,S$GLB, | Performed by: NURSE PRACTITIONER

## 2023-02-02 PROCEDURE — 1101F PR PT FALLS ASSESS DOC 0-1 FALLS W/OUT INJ PAST YR: ICD-10-PCS | Mod: HCNC,CPTII,S$GLB, | Performed by: NURSE PRACTITIONER

## 2023-02-02 PROCEDURE — 3074F SYST BP LT 130 MM HG: CPT | Mod: HCNC,CPTII,S$GLB, | Performed by: NURSE PRACTITIONER

## 2023-02-02 PROCEDURE — 1170F PR FUNCTIONAL STATUS ASSESSED: ICD-10-PCS | Mod: HCNC,CPTII,S$GLB, | Performed by: NURSE PRACTITIONER

## 2023-02-02 PROCEDURE — 3288F PR FALLS RISK ASSESSMENT DOCUMENTED: ICD-10-PCS | Mod: HCNC,CPTII,S$GLB, | Performed by: NURSE PRACTITIONER

## 2023-02-02 PROCEDURE — 3008F BODY MASS INDEX DOCD: CPT | Mod: HCNC,CPTII,S$GLB, | Performed by: NURSE PRACTITIONER

## 2023-02-02 PROCEDURE — 3074F PR MOST RECENT SYSTOLIC BLOOD PRESSURE < 130 MM HG: ICD-10-PCS | Mod: HCNC,CPTII,S$GLB, | Performed by: NURSE PRACTITIONER

## 2023-02-02 PROCEDURE — 1160F RVW MEDS BY RX/DR IN RCRD: CPT | Mod: HCNC,CPTII,S$GLB, | Performed by: NURSE PRACTITIONER

## 2023-02-02 PROCEDURE — 91312 COVID-19, MRNA, LNP-S, BIVALENT BOOSTER, PF, 30 MCG/0.3 ML DOSE: CPT | Mod: HCNC,S$GLB,, | Performed by: INTERNAL MEDICINE

## 2023-02-02 PROCEDURE — G0439 PR MEDICARE ANNUAL WELLNESS SUBSEQUENT VISIT: ICD-10-PCS | Mod: HCNC,S$GLB,, | Performed by: NURSE PRACTITIONER

## 2023-02-02 PROCEDURE — 3008F PR BODY MASS INDEX (BMI) DOCUMENTED: ICD-10-PCS | Mod: HCNC,CPTII,S$GLB, | Performed by: NURSE PRACTITIONER

## 2023-02-02 PROCEDURE — G0439 PPPS, SUBSEQ VISIT: HCPCS | Mod: HCNC,S$GLB,, | Performed by: NURSE PRACTITIONER

## 2023-02-02 NOTE — PROGRESS NOTES
Patient, Jennifer Motley (MRN #[id number]), presented with a recorded BMI of 36.4 kg/m^2 and a documented comorbidity(s):  - Diabetes Mellitus Type 2  - Hypertension  to which the severe obesity is a contributing factor. This is consistent with the definition of severe obesity (BMI 35.0-39.9) with comorbidity (ICD-10 E66.01, Z68.35). The patient's severe obesity was monitored, evaluated, addressed and/or treated. This addendum to the medical record is made on 02/02/2023.

## 2023-02-02 NOTE — PROGRESS NOTES
"  Jennifer Motley presented for a  Medicare AWV and comprehensive Health Risk Assessment today. The following components were reviewed and updated:    Medical history  Family History  Social history  Allergies and Current Medications  Health Risk Assessment  Health Maintenance  Care Team         ** See Completed Assessments for Annual Wellness Visit within the encounter summary.**         The following assessments were completed:  Living Situation  CAGE  Depression Screening  Timed Get Up and Go  Whisper Test  Cognitive Function Screening  Nutrition Screening  ADL Screening  PAQ Screening          Vitals:    02/02/23 1005   BP: 116/62   BP Location: Left arm   Patient Position: Sitting   Pulse: 63   SpO2: 98%   Weight: 93.2 kg (205 lb 7.5 oz)   Height: 5' 3" (1.6 m)     Body mass index is 36.4 kg/m².  Physical Exam  Vitals reviewed.   Constitutional:       Appearance: She is well-developed. She is obese.   HENT:      Head: Normocephalic and atraumatic. Not macrocephalic and not microcephalic. No raccoon eyes, Carcamo's sign, abrasion, contusion, right periorbital erythema, left periorbital erythema or laceration. Hair is normal.      Right Ear: No decreased hearing noted. No laceration, drainage, swelling or tenderness. No middle ear effusion. No foreign body. No mastoid tenderness. No hemotympanum. Tympanic membrane is not injected, scarred, perforated, erythematous, retracted or bulging. Tympanic membrane has normal mobility.      Left Ear: No decreased hearing noted. No laceration, drainage, swelling or tenderness.  No middle ear effusion. No foreign body. No mastoid tenderness. No hemotympanum. Tympanic membrane is not injected, scarred, perforated, erythematous, retracted or bulging. Tympanic membrane has normal mobility.      Nose: Nose normal. No nasal deformity, laceration or mucosal edema.      Mouth/Throat:      Pharynx: Uvula midline.   Eyes:      General: Lids are normal. No scleral icterus.     " Conjunctiva/sclera: Conjunctivae normal.   Neck:      Thyroid: No thyroid mass or thyromegaly.      Trachea: Trachea normal.   Cardiovascular:      Rate and Rhythm: Normal rate and regular rhythm.   Pulmonary:      Effort: Pulmonary effort is normal. No respiratory distress.      Breath sounds: Normal breath sounds.   Abdominal:      Palpations: Abdomen is soft.   Musculoskeletal:         General: Normal range of motion.      Cervical back: Neck supple. No edema or erythema. No spinous process tenderness or muscular tenderness. Normal range of motion.   Lymphadenopathy:      Head:      Right side of head: No submental, submandibular, tonsillar, preauricular or posterior auricular adenopathy.      Left side of head: No submental, submandibular, tonsillar, preauricular, posterior auricular or occipital adenopathy.   Skin:     General: Skin is warm and dry.   Neurological:      Mental Status: She is alert and oriented to person, place, and time.      Cranial Nerves: No cranial nerve deficit.      Sensory: No sensory deficit.   Psychiatric:         Behavior: Behavior normal.         Thought Content: Thought content normal.         Judgment: Judgment normal.             Diagnoses and health risks identified today and associated recommendations/orders:    1. Encounter for preventive health examination  Annual Health Risk Assessment (HRA) visit today.  Counseling and referral of health maintenance and preventative health measures performed.  Patient given annual wellness paperwork to take home.  Encouraged to return in 1 year for subsequent HRA visit.     2. Controlled type 2 diabetes mellitus without complication, without long-term current use of insulin  Chronic. Stable. Continue current treatment plan as previously prescribed by PCP.    3. Type 2 diabetes mellitus with diabetic polyneuropathy, unspecified whether long term insulin use  Chronic. Stable. Continue current treatment plan as previously prescribed by  PCP.    4. Candidate for statin therapy due to risk of future cardiovascular event  Chronic. Stable. Continue current treatment plan as previously prescribed by PCP.    5. Essential hypertension  Chronic. Stable. Controlled. Encouraged to increase exercise as tolerated (moderate-intensity aerobic activity and muscle-strengthening activities) improve diet to heart healthy, low sodium diet.  Continue current treatment plan as previously prescribed by PCP.    6. Idiopathic ventricular tachycardia  Chronic. Stable. Continue current treatment plan as previously prescribed by PCP.    7. Ventricular tachycardia  Chronic. Stable. Continue current treatment plan as previously prescribed by PCP.    8. Overactive bladder  Chronic. Stable. Continue current treatment plan as previously prescribed by PCP.    9. Family history of ovarian cancer  Chronic. Stable. Continue current treatment plan as previously prescribed by PCP.    10. Obesity (BMI 30.0-34.9)  Chronic. Stable. Continue current treatment plan as previously prescribed by PCP.    11. Thyroid cyst  Chronic. Stable. Continue current treatment plan as previously prescribed by PCP.    12. Osteopenia, unspecified location  Chronic. Stable. Continue current treatment plan as previously prescribed by PCP.    13. Ocular hypertension, unspecified laterality  Chronic. Stable. Continue current treatment plan as previously prescribed by PCP.    14. Severe obesity (BMI 35.0-39.9) with comorbidity  Chronic. Stable. Encouraged to increase exercise as tolerated and improve diet to heart healthy, low sodium diet. Continue current treatment plan as previously prescribed by PCP.      Provided Jennifer with a 5-10 year written screening schedule and personal prevention plan. Recommendations were developed using the USPSTF age appropriate recommendations. Education, counseling, and referrals were provided as needed. After Visit Summary printed and given to patient which includes a list of  additional screenings\tests needed.      I offered to discuss end of life issues, including information on how to make advance directives that the patient could use to name someone who would make medical decisions on their behalf if they became too ill to make themselves.    ___Patient declined  _X_Patient is interested, I provided paper work and offered to discuss.    No follow-ups on file.    Mark Rivers NPI offered to discuss advanced care planning, including how to pick a person who would make decisions for you if you were unable to make them for yourself, called a health care power of , and what kind of decisions you might make such as use of life sustaining treatments such as ventilators and tube feeding when faced with a life limiting illness recorded on a living will that they will need to know. (How you want to be cared for as you near the end of your natural life)     X Patient is interested in learning more about how to make advanced directives.  I provided them paperwork and offered to discuss this with them.

## 2023-02-02 NOTE — PATIENT INSTRUCTIONS
Counseling and Referral of Other Preventative  (Italic type indicates deductible and co-insurance are waived)    Patient Name: Jennifer Motley  Today's Date: 2/2/2023    Health Maintenance       Date Due Completion Date    Shingles Vaccine (1 of 2) Never done ---    COVID-19 Vaccine (5 - Booster for Pfizer series) 06/07/2022 4/12/2022    Lipid Panel 12/20/2022 12/20/2021    Override on 9/16/2021: Done    Override on 4/20/2018: Done    Override on 10/1/2016: Done    Hemoglobin A1c 05/15/2023 11/15/2022    Override on 5/8/2018: Done    Mammogram 06/19/2023 12/19/2022    Override on 12/28/2020: Done    Override on 10/17/2019: Done    Diabetes Urine Screening 11/15/2023 11/15/2022    Foot Exam 11/15/2023 11/15/2022    Override on 7/25/2019: Done    Override on 5/22/2018: Done    DEXA Scan 12/18/2023 12/18/2020    Override on 6/1/2015: Done    Eye Exam 01/03/2024 1/3/2023    Override on 11/13/2020: Done    Override on 4/23/2018: Done    Colorectal Cancer Screening 06/07/2024 6/7/2021    TETANUS VACCINE 10/11/2030 10/11/2020        No orders of the defined types were placed in this encounter.    The following information is provided to all patients.  This information is to help you find resources for any of the problems found today that may be affecting your health:                Living healthy guide: www.[facility].[place].gov      Understanding Diabetes: www.diabetes.org      Eating healthy: www.cdc.gov/healthyweight      CDC home safety checklist: www.cdc.gov/steadi/patient.html      Agency on Aging: www.goea.[place].gov      Alcoholics anonymous (AA): www.aa.org      Physical Activity: www.[place].nih.gov/mq2nspm      Tobacco use: www.quitwithusla.org

## 2023-02-06 ENCOUNTER — PATIENT MESSAGE (OUTPATIENT)
Dept: INTERNAL MEDICINE | Facility: CLINIC | Age: 73
End: 2023-02-06
Payer: MEDICARE

## 2023-02-06 DIAGNOSIS — U07.1 COVID-19: Primary | ICD-10-CM

## 2023-02-06 NOTE — TELEPHONE ENCOUNTER
BMP  Lab Results   Component Value Date     11/16/2020    K 4.2 11/16/2020     11/16/2020    CO2 29 11/16/2020    BUN 16 11/16/2020    CREATININE 0.8 11/16/2020    CALCIUM 9.8 11/16/2020    ANIONGAP 8 11/16/2020        Dr. Kindra Preston D.O.   Family Medicine

## 2023-02-07 DIAGNOSIS — Z00.00 ENCOUNTER FOR MEDICARE ANNUAL WELLNESS EXAM: ICD-10-CM

## 2023-02-07 NOTE — TELEPHONE ENCOUNTER
Spoke to Ms. Motley and patient states that she is taking Pyridium for the pain.  Instructed patient to take the Tylenol for the pain bc Pyridium is for UTI's.  Patient states understanding and that she will  Rx today.

## 2023-02-09 ENCOUNTER — PATIENT MESSAGE (OUTPATIENT)
Dept: INTERNAL MEDICINE | Facility: CLINIC | Age: 73
End: 2023-02-09
Payer: MEDICARE

## 2023-02-09 DIAGNOSIS — Z00.00 ENCOUNTER FOR MEDICARE ANNUAL WELLNESS EXAM: ICD-10-CM

## 2023-02-18 ENCOUNTER — CLINICAL SUPPORT (OUTPATIENT)
Dept: CARDIOLOGY | Facility: HOSPITAL | Age: 73
End: 2023-02-18
Payer: MEDICARE

## 2023-02-18 DIAGNOSIS — Z95.818 PRESENCE OF OTHER CARDIAC IMPLANTS AND GRAFTS: ICD-10-CM

## 2023-02-18 PROCEDURE — G2066 INTER DEVC REMOTE 30D: HCPCS | Mod: HCNC | Performed by: INTERNAL MEDICINE

## 2023-02-26 NOTE — PROGRESS NOTES
HPI     Glaucoma     Additional comments: HVF and OCT review today and pt states no changes   since last exam            Comments    DLS: 10/17/22    1. POAG OU  2. NS OU  3. Type 2 DM no DR    MEDS:  Dorzolamide TID OU  Travatan Z QHS OU          Last edited by Chhaya Chester MA on 2023 11:03 AM.            Assessment /Plan     For exam results, see Encounter Report.    Primary open angle glaucoma (POAG) of both eyes, mild stage    Cataract, nuclear sclerotic, both eyes    Type 2 diabetes mellitus without ophthalmic manifestations        1. Patient followed by Dr. Balwinder Gregg for many years. - post Mary   Pre-Mary - was seeing dr Nadine Avila   Seen by Dr. Kebede in . Seen by Dr. eHnsley (retina) for ? Mild armd  in . - taking AREDs but only 1 x day - is to F/U w/ her yearly        Glaucoma (type and duration)    Primary open angle glaucoma OU. Mild.    First HVF    (outside records - fullish)    First photos   10/2021    Treatment / Drops started   About            Family history    Mother  since . H/o glaucoma surgery/drops.         Glaucoma meds    Dorzolamide TID OU. Travatan QHS OU.         H/O adverse rxn to glaucoma drops:   H/o bundle branch block. Taken off of beta blockers.         LASERS    None        GLAUCOMA SURGERIES    None        OTHER EYE SURGERIES    None        CDR    0.75/0.8        Tbase    ?? Off gtts           Tmax    - on gtts           Ttarget    18 ou - may need to be lower              HVF   - many old outside test - pt brought in - all near normal - Dr Balwinder Gregg     2 test  to   - SNS  od // ?SAD / INS  os        Gonio    +3 ou         CCT    505/ 507         OCT    2 test  to  - RNFL - dec G/TI, bord NS/NI od // bord TI  os        Disc photos    10/18/21 with Dr. Simons    - Ttmargarita     (target 18 ou)   - Test done today    IOP -// HVF // DFE // OCT     2, NS ou - mild monitor     3. Diabetes    No BDR     Plan:    Continue dorzolamide TID OU and Travatan QHS OU.  Avoid BB's - heart block   Monitor HVF's / and IOP and OCT going forward - to monitor for progression   + SNS od // ? INS os     Would be difficult to do slt - pt squeezes a lot with gonio     F/U 4 months: IOP // gonio     Pt records were given back to her - they have not been scanned into Epic - (10/18/2021)    But the important data has been - reviewed and abstracted

## 2023-02-27 ENCOUNTER — OFFICE VISIT (OUTPATIENT)
Dept: OPHTHALMOLOGY | Facility: CLINIC | Age: 73
End: 2023-02-27
Payer: MEDICARE

## 2023-02-27 ENCOUNTER — CLINICAL SUPPORT (OUTPATIENT)
Dept: OPHTHALMOLOGY | Facility: CLINIC | Age: 73
End: 2023-02-27
Payer: MEDICARE

## 2023-02-27 DIAGNOSIS — E11.9 TYPE 2 DIABETES MELLITUS WITHOUT OPHTHALMIC MANIFESTATIONS: ICD-10-CM

## 2023-02-27 DIAGNOSIS — H40.1131 PRIMARY OPEN ANGLE GLAUCOMA (POAG) OF BOTH EYES, MILD STAGE: Primary | ICD-10-CM

## 2023-02-27 DIAGNOSIS — H25.13 CATARACT, NUCLEAR SCLEROTIC, BOTH EYES: ICD-10-CM

## 2023-02-27 PROCEDURE — 92014 COMPRE OPH EXAM EST PT 1/>: CPT | Mod: HCNC,S$GLB,, | Performed by: OPHTHALMOLOGY

## 2023-02-27 PROCEDURE — 1159F MED LIST DOCD IN RCRD: CPT | Mod: HCNC,CPTII,S$GLB, | Performed by: OPHTHALMOLOGY

## 2023-02-27 PROCEDURE — 99999 PR PBB SHADOW E&M-EST. PATIENT-LVL III: ICD-10-PCS | Mod: PBBFAC,HCNC,, | Performed by: OPHTHALMOLOGY

## 2023-02-27 PROCEDURE — 92083 HUMPHREY VISUAL FIELD - OU - BOTH EYES: ICD-10-PCS | Mod: HCNC,S$GLB,, | Performed by: OPHTHALMOLOGY

## 2023-02-27 PROCEDURE — 1160F RVW MEDS BY RX/DR IN RCRD: CPT | Mod: HCNC,CPTII,S$GLB, | Performed by: OPHTHALMOLOGY

## 2023-02-27 PROCEDURE — 92014 PR EYE EXAM, EST PATIENT,COMPREHESV: ICD-10-PCS | Mod: HCNC,S$GLB,, | Performed by: OPHTHALMOLOGY

## 2023-02-27 PROCEDURE — 2023F PR DILATED RETINAL EXAM W/O EVID OF RETINOPATHY: ICD-10-PCS | Mod: HCNC,CPTII,S$GLB, | Performed by: OPHTHALMOLOGY

## 2023-02-27 PROCEDURE — 92133 CPTRZD OPH DX IMG PST SGM ON: CPT | Mod: HCNC,S$GLB,, | Performed by: OPHTHALMOLOGY

## 2023-02-27 PROCEDURE — 92083 EXTENDED VISUAL FIELD XM: CPT | Mod: HCNC,S$GLB,, | Performed by: OPHTHALMOLOGY

## 2023-02-27 PROCEDURE — 99999 PR PBB SHADOW E&M-EST. PATIENT-LVL III: CPT | Mod: PBBFAC,HCNC,, | Performed by: OPHTHALMOLOGY

## 2023-02-27 PROCEDURE — 2023F DILAT RTA XM W/O RTNOPTHY: CPT | Mod: HCNC,CPTII,S$GLB, | Performed by: OPHTHALMOLOGY

## 2023-02-27 PROCEDURE — 3288F FALL RISK ASSESSMENT DOCD: CPT | Mod: HCNC,CPTII,S$GLB, | Performed by: OPHTHALMOLOGY

## 2023-02-27 PROCEDURE — 1160F PR REVIEW ALL MEDS BY PRESCRIBER/CLIN PHARMACIST DOCUMENTED: ICD-10-PCS | Mod: HCNC,CPTII,S$GLB, | Performed by: OPHTHALMOLOGY

## 2023-02-27 PROCEDURE — 1126F AMNT PAIN NOTED NONE PRSNT: CPT | Mod: HCNC,CPTII,S$GLB, | Performed by: OPHTHALMOLOGY

## 2023-02-27 PROCEDURE — 92133 POSTERIOR SEGMENT OCT OPTIC NERVE(OCULAR COHERENCE TOMOGRAPHY) - OU - BOTH EYES: ICD-10-PCS | Mod: HCNC,S$GLB,, | Performed by: OPHTHALMOLOGY

## 2023-02-27 PROCEDURE — 1159F PR MEDICATION LIST DOCUMENTED IN MEDICAL RECORD: ICD-10-PCS | Mod: HCNC,CPTII,S$GLB, | Performed by: OPHTHALMOLOGY

## 2023-02-27 PROCEDURE — 1101F PT FALLS ASSESS-DOCD LE1/YR: CPT | Mod: HCNC,CPTII,S$GLB, | Performed by: OPHTHALMOLOGY

## 2023-02-27 PROCEDURE — 3288F PR FALLS RISK ASSESSMENT DOCUMENTED: ICD-10-PCS | Mod: HCNC,CPTII,S$GLB, | Performed by: OPHTHALMOLOGY

## 2023-02-27 PROCEDURE — 1126F PR PAIN SEVERITY QUANTIFIED, NO PAIN PRESENT: ICD-10-PCS | Mod: HCNC,CPTII,S$GLB, | Performed by: OPHTHALMOLOGY

## 2023-02-27 PROCEDURE — 1101F PR PT FALLS ASSESS DOC 0-1 FALLS W/OUT INJ PAST YR: ICD-10-PCS | Mod: HCNC,CPTII,S$GLB, | Performed by: OPHTHALMOLOGY

## 2023-02-27 RX ORDER — DORZOLAMIDE HCL 20 MG/ML
1 SOLUTION/ DROPS OPHTHALMIC 3 TIMES DAILY
Qty: 30 ML | Refills: 3 | Status: SHIPPED | OUTPATIENT
Start: 2023-02-27 | End: 2024-01-11

## 2023-02-27 RX ORDER — TRAVOPROST OPHTHALMIC SOLUTION 0.04 MG/ML
1 SOLUTION OPHTHALMIC NIGHTLY
Qty: 7.5 ML | Refills: 3 | Status: SHIPPED | OUTPATIENT
Start: 2023-02-27 | End: 2023-04-24 | Stop reason: CLARIF

## 2023-02-28 NOTE — PROGRESS NOTES
HVF done ou. Rel/fix/coop. Good ou./chart checked for latex allergy. -1.00/od  -1.25 + .50 x 51/os-smh

## 2023-03-20 ENCOUNTER — CLINICAL SUPPORT (OUTPATIENT)
Dept: CARDIOLOGY | Facility: HOSPITAL | Age: 73
End: 2023-03-20
Payer: MEDICARE

## 2023-03-20 DIAGNOSIS — Z95.818 PRESENCE OF OTHER CARDIAC IMPLANTS AND GRAFTS: ICD-10-CM

## 2023-03-20 PROCEDURE — 93298 REM INTERROG DEV EVAL SCRMS: CPT | Mod: HCNC,,, | Performed by: INTERNAL MEDICINE

## 2023-03-20 PROCEDURE — G2066 INTER DEVC REMOTE 30D: HCPCS | Mod: HCNC | Performed by: INTERNAL MEDICINE

## 2023-03-20 PROCEDURE — 93298 CARDIAC DEVICE CHECK - REMOTE: ICD-10-PCS | Mod: HCNC,,, | Performed by: INTERNAL MEDICINE

## 2023-04-19 ENCOUNTER — CLINICAL SUPPORT (OUTPATIENT)
Dept: CARDIOLOGY | Facility: HOSPITAL | Age: 73
End: 2023-04-19
Payer: MEDICARE

## 2023-04-19 DIAGNOSIS — Z95.818 PRESENCE OF OTHER CARDIAC IMPLANTS AND GRAFTS: ICD-10-CM

## 2023-04-19 PROCEDURE — G2066 INTER DEVC REMOTE 30D: HCPCS | Performed by: INTERNAL MEDICINE

## 2023-04-24 ENCOUNTER — PATIENT MESSAGE (OUTPATIENT)
Dept: OPHTHALMOLOGY | Facility: CLINIC | Age: 73
End: 2023-04-24
Payer: MEDICARE

## 2023-04-24 DIAGNOSIS — H40.1130 PRIMARY OPEN ANGLE GLAUCOMA OF BOTH EYES, UNSPECIFIED GLAUCOMA STAGE: Primary | ICD-10-CM

## 2023-04-24 RX ORDER — LATANOPROST 50 UG/ML
1 SOLUTION/ DROPS OPHTHALMIC NIGHTLY
COMMUNITY
Start: 2023-04-24 | End: 2023-04-24 | Stop reason: SDUPTHER

## 2023-04-24 RX ORDER — LATANOPROST 50 UG/ML
1 SOLUTION/ DROPS OPHTHALMIC NIGHTLY
Qty: 15 ML | Refills: 3 | Status: SHIPPED | OUTPATIENT
Start: 2023-04-24

## 2023-05-19 ENCOUNTER — CLINICAL SUPPORT (OUTPATIENT)
Dept: CARDIOLOGY | Facility: HOSPITAL | Age: 73
End: 2023-05-19
Payer: MEDICARE

## 2023-05-19 DIAGNOSIS — Z95.818 PRESENCE OF OTHER CARDIAC IMPLANTS AND GRAFTS: ICD-10-CM

## 2023-05-19 PROCEDURE — 93298 CARDIAC DEVICE CHECK - REMOTE: ICD-10-PCS | Mod: ,,, | Performed by: INTERNAL MEDICINE

## 2023-05-19 PROCEDURE — G2066 INTER DEVC REMOTE 30D: HCPCS | Performed by: INTERNAL MEDICINE

## 2023-05-19 PROCEDURE — 93298 REM INTERROG DEV EVAL SCRMS: CPT | Mod: ,,, | Performed by: INTERNAL MEDICINE

## 2023-05-25 ENCOUNTER — OFFICE VISIT (OUTPATIENT)
Dept: INTERNAL MEDICINE | Facility: CLINIC | Age: 73
End: 2023-05-25
Payer: MEDICARE

## 2023-05-25 ENCOUNTER — HOSPITAL ENCOUNTER (OUTPATIENT)
Dept: RADIOLOGY | Facility: OTHER | Age: 73
Discharge: HOME | End: 2023-05-25
Attending: PHYSICIAN ASSISTANT
Payer: MEDICARE

## 2023-05-25 VITALS
DIASTOLIC BLOOD PRESSURE: 62 MMHG | BODY MASS INDEX: 35.66 KG/M2 | SYSTOLIC BLOOD PRESSURE: 125 MMHG | HEIGHT: 63 IN | WEIGHT: 201.25 LBS | HEART RATE: 76 BPM | OXYGEN SATURATION: 98 %

## 2023-05-25 DIAGNOSIS — M25.572 ACUTE LEFT ANKLE PAIN: Primary | ICD-10-CM

## 2023-05-25 DIAGNOSIS — E11.9 CONTROLLED TYPE 2 DIABETES MELLITUS WITHOUT COMPLICATION, WITHOUT LONG-TERM CURRENT USE OF INSULIN: ICD-10-CM

## 2023-05-25 DIAGNOSIS — M25.572 ACUTE LEFT ANKLE PAIN: ICD-10-CM

## 2023-05-25 DIAGNOSIS — I10 ESSENTIAL HYPERTENSION: ICD-10-CM

## 2023-05-25 PROCEDURE — 99999 PR PBB SHADOW E&M-EST. PATIENT-LVL IV: ICD-10-PCS | Mod: PBBFAC,,, | Performed by: PHYSICIAN ASSISTANT

## 2023-05-25 PROCEDURE — 3008F BODY MASS INDEX DOCD: CPT | Mod: CPTII,S$GLB,, | Performed by: PHYSICIAN ASSISTANT

## 2023-05-25 PROCEDURE — 3044F PR MOST RECENT HEMOGLOBIN A1C LEVEL <7.0%: ICD-10-PCS | Mod: CPTII,S$GLB,, | Performed by: PHYSICIAN ASSISTANT

## 2023-05-25 PROCEDURE — 99999 PR PBB SHADOW E&M-EST. PATIENT-LVL IV: CPT | Mod: PBBFAC,,, | Performed by: PHYSICIAN ASSISTANT

## 2023-05-25 PROCEDURE — 73610 X-RAY EXAM OF ANKLE: CPT | Mod: 26,LT,, | Performed by: RADIOLOGY

## 2023-05-25 PROCEDURE — 3008F PR BODY MASS INDEX (BMI) DOCUMENTED: ICD-10-PCS | Mod: CPTII,S$GLB,, | Performed by: PHYSICIAN ASSISTANT

## 2023-05-25 PROCEDURE — 3288F PR FALLS RISK ASSESSMENT DOCUMENTED: ICD-10-PCS | Mod: CPTII,S$GLB,, | Performed by: PHYSICIAN ASSISTANT

## 2023-05-25 PROCEDURE — 1101F PT FALLS ASSESS-DOCD LE1/YR: CPT | Mod: CPTII,S$GLB,, | Performed by: PHYSICIAN ASSISTANT

## 2023-05-25 PROCEDURE — 99214 OFFICE O/P EST MOD 30 MIN: CPT | Mod: S$GLB,,, | Performed by: PHYSICIAN ASSISTANT

## 2023-05-25 PROCEDURE — 3078F PR MOST RECENT DIASTOLIC BLOOD PRESSURE < 80 MM HG: ICD-10-PCS | Mod: CPTII,S$GLB,, | Performed by: PHYSICIAN ASSISTANT

## 2023-05-25 PROCEDURE — 1159F MED LIST DOCD IN RCRD: CPT | Mod: CPTII,S$GLB,, | Performed by: PHYSICIAN ASSISTANT

## 2023-05-25 PROCEDURE — 3074F PR MOST RECENT SYSTOLIC BLOOD PRESSURE < 130 MM HG: ICD-10-PCS | Mod: CPTII,S$GLB,, | Performed by: PHYSICIAN ASSISTANT

## 2023-05-25 PROCEDURE — 3074F SYST BP LT 130 MM HG: CPT | Mod: CPTII,S$GLB,, | Performed by: PHYSICIAN ASSISTANT

## 2023-05-25 PROCEDURE — 4010F ACE/ARB THERAPY RXD/TAKEN: CPT | Mod: CPTII,S$GLB,, | Performed by: PHYSICIAN ASSISTANT

## 2023-05-25 PROCEDURE — 1125F PR PAIN SEVERITY QUANTIFIED, PAIN PRESENT: ICD-10-PCS | Mod: CPTII,S$GLB,, | Performed by: PHYSICIAN ASSISTANT

## 2023-05-25 PROCEDURE — 73610 X-RAY EXAM OF ANKLE: CPT | Mod: TC,FY,LT

## 2023-05-25 PROCEDURE — 4010F PR ACE/ARB THEARPY RXD/TAKEN: ICD-10-PCS | Mod: CPTII,S$GLB,, | Performed by: PHYSICIAN ASSISTANT

## 2023-05-25 PROCEDURE — 1125F AMNT PAIN NOTED PAIN PRSNT: CPT | Mod: CPTII,S$GLB,, | Performed by: PHYSICIAN ASSISTANT

## 2023-05-25 PROCEDURE — 1160F RVW MEDS BY RX/DR IN RCRD: CPT | Mod: CPTII,S$GLB,, | Performed by: PHYSICIAN ASSISTANT

## 2023-05-25 PROCEDURE — 3078F DIAST BP <80 MM HG: CPT | Mod: CPTII,S$GLB,, | Performed by: PHYSICIAN ASSISTANT

## 2023-05-25 PROCEDURE — 99214 PR OFFICE/OUTPT VISIT, EST, LEVL IV, 30-39 MIN: ICD-10-PCS | Mod: S$GLB,,, | Performed by: PHYSICIAN ASSISTANT

## 2023-05-25 PROCEDURE — 73610 XR ANKLE COMPLETE 3 VIEW LEFT: ICD-10-PCS | Mod: 26,LT,, | Performed by: RADIOLOGY

## 2023-05-25 PROCEDURE — 1101F PR PT FALLS ASSESS DOC 0-1 FALLS W/OUT INJ PAST YR: ICD-10-PCS | Mod: CPTII,S$GLB,, | Performed by: PHYSICIAN ASSISTANT

## 2023-05-25 PROCEDURE — 3044F HG A1C LEVEL LT 7.0%: CPT | Mod: CPTII,S$GLB,, | Performed by: PHYSICIAN ASSISTANT

## 2023-05-25 PROCEDURE — 1160F PR REVIEW ALL MEDS BY PRESCRIBER/CLIN PHARMACIST DOCUMENTED: ICD-10-PCS | Mod: CPTII,S$GLB,, | Performed by: PHYSICIAN ASSISTANT

## 2023-05-25 PROCEDURE — 1159F PR MEDICATION LIST DOCUMENTED IN MEDICAL RECORD: ICD-10-PCS | Mod: CPTII,S$GLB,, | Performed by: PHYSICIAN ASSISTANT

## 2023-05-25 PROCEDURE — 3288F FALL RISK ASSESSMENT DOCD: CPT | Mod: CPTII,S$GLB,, | Performed by: PHYSICIAN ASSISTANT

## 2023-06-02 ENCOUNTER — TELEPHONE (OUTPATIENT)
Dept: ELECTROPHYSIOLOGY | Facility: CLINIC | Age: 73
End: 2023-06-02
Payer: MEDICARE

## 2023-06-05 NOTE — PROGRESS NOTES
HPI    Dls:2023 Dr. Simons    Pt is present for 4 month iop Check     Pt states she is doing well no changes.     1. POAG OU   2. NS OU   3. Type 2 DM no DR     Eye Meds:  Dorzolomide TID OU   Travatan Z Qhs OU      Last edited by Destiney Cantu on 2023  9:54 AM.            Assessment /Plan     For exam results, see Encounter Report.    Primary open angle glaucoma (POAG) of both eyes, mild stage    Cataract, nuclear sclerotic, both eyes    Type 2 diabetes mellitus without ophthalmic manifestations          1. Patient followed by Dr. Balwinder Gregg for many years. - post Mary   Pre-Mary - was seeing dr Nadine Avila   Seen by Dr. Kebede in . Seen by Dr. Hensley (retina) for ? Mild armd  in . - taking AREDs but only 1 x day - is to F/U w/ her yearly        Glaucoma (type and duration)    Primary open angle glaucoma OU. Mild.    First HVF    (outside records - fullish)    First photos   10/2021    Treatment / Drops started   About            Family history    Mother  since . H/o glaucoma surgery/drops.         Glaucoma meds    Dorzolamide TID OU. Travatan QHS OU.         H/O adverse rxn to glaucoma drops:   H/o bundle branch block. Taken off of beta blockers.         LASERS    None        GLAUCOMA SURGERIES    None        OTHER EYE SURGERIES    None        CDR    0.75/0.8        Tbase    ?? Off gtts           Tmax    - on gtts           Ttarget    18 ou - may need to be lower              HVF   - many old outside test - pt brought in - all near normal - Dr Balwinder Gregg     2 test  to   - SNS  od // ?SAD / INS  os        Gonio    +3 ou         CCT    505/ 507         OCT    2 test  to  - RNFL - dec G/TI, bord NS/NI od // bord TI  os        Disc photos    10/18/21 with Dr. Simons    - Ttoday      (target 18 ou)   - Test done today    IOP -//  gonio     2, NS ou - mild monitor     3. Diabetes    No BDR     Plan:   Continue dorzolamide TID OU and  Travatan QHS OU.  Avoid BB's - heart block   Monitor HVF's / and IOP and OCT going forward - to monitor for progression   + SNS od // ? INS os     Would be difficult to do slt - pt squeezes a lot with gonio     Pt records were given back to her - they have not been scanned into Epic - (10/18/2021)    But the important data has been - reviewed and abstracted     F/U 4 months - consider a trial of brimonidine / simbrinza / or rocklatan prn

## 2023-06-06 ENCOUNTER — TELEPHONE (OUTPATIENT)
Dept: ELECTROPHYSIOLOGY | Facility: CLINIC | Age: 73
End: 2023-06-06
Payer: MEDICARE

## 2023-06-06 DIAGNOSIS — I47.29 IDIOPATHIC VENTRICULAR TACHYCARDIA: Primary | ICD-10-CM

## 2023-06-09 ENCOUNTER — OFFICE VISIT (OUTPATIENT)
Dept: OPHTHALMOLOGY | Facility: CLINIC | Age: 73
End: 2023-06-09
Payer: MEDICARE

## 2023-06-09 DIAGNOSIS — E11.9 TYPE 2 DIABETES MELLITUS WITHOUT OPHTHALMIC MANIFESTATIONS: ICD-10-CM

## 2023-06-09 DIAGNOSIS — H25.13 CATARACT, NUCLEAR SCLEROTIC, BOTH EYES: ICD-10-CM

## 2023-06-09 DIAGNOSIS — H40.1131 PRIMARY OPEN ANGLE GLAUCOMA (POAG) OF BOTH EYES, MILD STAGE: Primary | ICD-10-CM

## 2023-06-09 PROCEDURE — 3044F PR MOST RECENT HEMOGLOBIN A1C LEVEL <7.0%: ICD-10-PCS | Mod: CPTII,S$GLB,, | Performed by: OPHTHALMOLOGY

## 2023-06-09 PROCEDURE — 1159F MED LIST DOCD IN RCRD: CPT | Mod: CPTII,S$GLB,, | Performed by: OPHTHALMOLOGY

## 2023-06-09 PROCEDURE — 1101F PR PT FALLS ASSESS DOC 0-1 FALLS W/OUT INJ PAST YR: ICD-10-PCS | Mod: CPTII,S$GLB,, | Performed by: OPHTHALMOLOGY

## 2023-06-09 PROCEDURE — 99999 PR PBB SHADOW E&M-EST. PATIENT-LVL III: CPT | Mod: PBBFAC,,, | Performed by: OPHTHALMOLOGY

## 2023-06-09 PROCEDURE — 1159F PR MEDICATION LIST DOCUMENTED IN MEDICAL RECORD: ICD-10-PCS | Mod: CPTII,S$GLB,, | Performed by: OPHTHALMOLOGY

## 2023-06-09 PROCEDURE — 92020 GONIOSCOPY: CPT | Mod: S$GLB,,, | Performed by: OPHTHALMOLOGY

## 2023-06-09 PROCEDURE — 3044F HG A1C LEVEL LT 7.0%: CPT | Mod: CPTII,S$GLB,, | Performed by: OPHTHALMOLOGY

## 2023-06-09 PROCEDURE — 4010F ACE/ARB THERAPY RXD/TAKEN: CPT | Mod: CPTII,S$GLB,, | Performed by: OPHTHALMOLOGY

## 2023-06-09 PROCEDURE — 99214 PR OFFICE/OUTPT VISIT, EST, LEVL IV, 30-39 MIN: ICD-10-PCS | Mod: S$GLB,,, | Performed by: OPHTHALMOLOGY

## 2023-06-09 PROCEDURE — 1126F PR PAIN SEVERITY QUANTIFIED, NO PAIN PRESENT: ICD-10-PCS | Mod: CPTII,S$GLB,, | Performed by: OPHTHALMOLOGY

## 2023-06-09 PROCEDURE — 1126F AMNT PAIN NOTED NONE PRSNT: CPT | Mod: CPTII,S$GLB,, | Performed by: OPHTHALMOLOGY

## 2023-06-09 PROCEDURE — 3288F FALL RISK ASSESSMENT DOCD: CPT | Mod: CPTII,S$GLB,, | Performed by: OPHTHALMOLOGY

## 2023-06-09 PROCEDURE — 99214 OFFICE O/P EST MOD 30 MIN: CPT | Mod: S$GLB,,, | Performed by: OPHTHALMOLOGY

## 2023-06-09 PROCEDURE — 3288F PR FALLS RISK ASSESSMENT DOCUMENTED: ICD-10-PCS | Mod: CPTII,S$GLB,, | Performed by: OPHTHALMOLOGY

## 2023-06-09 PROCEDURE — 92020 PR SPECIAL EYE EVAL,GONIOSCOPY: ICD-10-PCS | Mod: S$GLB,,, | Performed by: OPHTHALMOLOGY

## 2023-06-09 PROCEDURE — 4010F PR ACE/ARB THEARPY RXD/TAKEN: ICD-10-PCS | Mod: CPTII,S$GLB,, | Performed by: OPHTHALMOLOGY

## 2023-06-09 PROCEDURE — 99999 PR PBB SHADOW E&M-EST. PATIENT-LVL III: ICD-10-PCS | Mod: PBBFAC,,, | Performed by: OPHTHALMOLOGY

## 2023-06-09 PROCEDURE — 1101F PT FALLS ASSESS-DOCD LE1/YR: CPT | Mod: CPTII,S$GLB,, | Performed by: OPHTHALMOLOGY

## 2023-06-18 ENCOUNTER — CLINICAL SUPPORT (OUTPATIENT)
Dept: CARDIOLOGY | Facility: HOSPITAL | Age: 73
End: 2023-06-18
Payer: MEDICARE

## 2023-06-18 DIAGNOSIS — Z95.818 PRESENCE OF OTHER CARDIAC IMPLANTS AND GRAFTS: ICD-10-CM

## 2023-06-18 PROCEDURE — G2066 INTER DEVC REMOTE 30D: HCPCS | Performed by: INTERNAL MEDICINE

## 2023-06-22 ENCOUNTER — OFFICE VISIT (OUTPATIENT)
Dept: INTERNAL MEDICINE | Facility: CLINIC | Age: 73
End: 2023-06-22
Payer: MEDICARE

## 2023-06-22 VITALS
OXYGEN SATURATION: 99 % | SYSTOLIC BLOOD PRESSURE: 122 MMHG | HEIGHT: 63 IN | WEIGHT: 203.94 LBS | DIASTOLIC BLOOD PRESSURE: 70 MMHG | HEART RATE: 77 BPM | BODY MASS INDEX: 36.14 KG/M2

## 2023-06-22 DIAGNOSIS — G89.29 CHRONIC PAIN OF LEFT ANKLE: ICD-10-CM

## 2023-06-22 DIAGNOSIS — E79.0 ELEVATED BLOOD URIC ACID LEVEL: ICD-10-CM

## 2023-06-22 DIAGNOSIS — J02.9 PHARYNGITIS, UNSPECIFIED ETIOLOGY: Primary | ICD-10-CM

## 2023-06-22 DIAGNOSIS — M25.572 CHRONIC PAIN OF LEFT ANKLE: ICD-10-CM

## 2023-06-22 DIAGNOSIS — R22.32 ARM MASS, LEFT: ICD-10-CM

## 2023-06-22 LAB
CTP QC/QA: YES
S PYO RRNA THROAT QL PROBE: NEGATIVE

## 2023-06-22 PROCEDURE — 1101F PT FALLS ASSESS-DOCD LE1/YR: CPT | Mod: HCNC,CPTII,S$GLB, | Performed by: PHYSICIAN ASSISTANT

## 2023-06-22 PROCEDURE — 87880 STREP A ASSAY W/OPTIC: CPT | Mod: QW,HCNC,S$GLB, | Performed by: PHYSICIAN ASSISTANT

## 2023-06-22 PROCEDURE — 3008F BODY MASS INDEX DOCD: CPT | Mod: HCNC,CPTII,S$GLB, | Performed by: PHYSICIAN ASSISTANT

## 2023-06-22 PROCEDURE — 99999 PR PBB SHADOW E&M-EST. PATIENT-LVL III: CPT | Mod: PBBFAC,,, | Performed by: PHYSICIAN ASSISTANT

## 2023-06-22 PROCEDURE — 4010F PR ACE/ARB THEARPY RXD/TAKEN: ICD-10-PCS | Mod: HCNC,CPTII,S$GLB, | Performed by: PHYSICIAN ASSISTANT

## 2023-06-22 PROCEDURE — 1101F PR PT FALLS ASSESS DOC 0-1 FALLS W/OUT INJ PAST YR: ICD-10-PCS | Mod: HCNC,CPTII,S$GLB, | Performed by: PHYSICIAN ASSISTANT

## 2023-06-22 PROCEDURE — 99999 PR PBB SHADOW E&M-EST. PATIENT-LVL III: ICD-10-PCS | Mod: PBBFAC,,, | Performed by: PHYSICIAN ASSISTANT

## 2023-06-22 PROCEDURE — 3078F PR MOST RECENT DIASTOLIC BLOOD PRESSURE < 80 MM HG: ICD-10-PCS | Mod: HCNC,CPTII,S$GLB, | Performed by: PHYSICIAN ASSISTANT

## 2023-06-22 PROCEDURE — 3078F DIAST BP <80 MM HG: CPT | Mod: HCNC,CPTII,S$GLB, | Performed by: PHYSICIAN ASSISTANT

## 2023-06-22 PROCEDURE — 1159F MED LIST DOCD IN RCRD: CPT | Mod: HCNC,CPTII,S$GLB, | Performed by: PHYSICIAN ASSISTANT

## 2023-06-22 PROCEDURE — 1126F AMNT PAIN NOTED NONE PRSNT: CPT | Mod: HCNC,CPTII,S$GLB, | Performed by: PHYSICIAN ASSISTANT

## 2023-06-22 PROCEDURE — 3044F HG A1C LEVEL LT 7.0%: CPT | Mod: HCNC,CPTII,S$GLB, | Performed by: PHYSICIAN ASSISTANT

## 2023-06-22 PROCEDURE — 3288F FALL RISK ASSESSMENT DOCD: CPT | Mod: HCNC,CPTII,S$GLB, | Performed by: PHYSICIAN ASSISTANT

## 2023-06-22 PROCEDURE — 4010F ACE/ARB THERAPY RXD/TAKEN: CPT | Mod: HCNC,CPTII,S$GLB, | Performed by: PHYSICIAN ASSISTANT

## 2023-06-22 PROCEDURE — 87880 POCT RAPID STREP A: ICD-10-PCS | Mod: QW,HCNC,S$GLB, | Performed by: PHYSICIAN ASSISTANT

## 2023-06-22 PROCEDURE — 3074F PR MOST RECENT SYSTOLIC BLOOD PRESSURE < 130 MM HG: ICD-10-PCS | Mod: HCNC,CPTII,S$GLB, | Performed by: PHYSICIAN ASSISTANT

## 2023-06-22 PROCEDURE — 1126F PR PAIN SEVERITY QUANTIFIED, NO PAIN PRESENT: ICD-10-PCS | Mod: HCNC,CPTII,S$GLB, | Performed by: PHYSICIAN ASSISTANT

## 2023-06-22 PROCEDURE — 3074F SYST BP LT 130 MM HG: CPT | Mod: HCNC,CPTII,S$GLB, | Performed by: PHYSICIAN ASSISTANT

## 2023-06-22 PROCEDURE — 3008F PR BODY MASS INDEX (BMI) DOCUMENTED: ICD-10-PCS | Mod: HCNC,CPTII,S$GLB, | Performed by: PHYSICIAN ASSISTANT

## 2023-06-22 PROCEDURE — 3288F PR FALLS RISK ASSESSMENT DOCUMENTED: ICD-10-PCS | Mod: HCNC,CPTII,S$GLB, | Performed by: PHYSICIAN ASSISTANT

## 2023-06-22 PROCEDURE — 1159F PR MEDICATION LIST DOCUMENTED IN MEDICAL RECORD: ICD-10-PCS | Mod: HCNC,CPTII,S$GLB, | Performed by: PHYSICIAN ASSISTANT

## 2023-06-22 PROCEDURE — 99214 OFFICE O/P EST MOD 30 MIN: CPT | Mod: HCNC,S$GLB,, | Performed by: PHYSICIAN ASSISTANT

## 2023-06-22 PROCEDURE — 3044F PR MOST RECENT HEMOGLOBIN A1C LEVEL <7.0%: ICD-10-PCS | Mod: HCNC,CPTII,S$GLB, | Performed by: PHYSICIAN ASSISTANT

## 2023-06-22 PROCEDURE — 99214 PR OFFICE/OUTPT VISIT, EST, LEVL IV, 30-39 MIN: ICD-10-PCS | Mod: HCNC,S$GLB,, | Performed by: PHYSICIAN ASSISTANT

## 2023-06-22 NOTE — PROGRESS NOTES
INTERNAL MEDICINE PROGRESS NOTE    CHIEF COMPLAINT     Chief Complaint   Patient presents with    Ankle Pain       HPI     Jennifer Motley is a 72 y.o. female who presents for a follow-up visit today.    PCP is Tahira Mistry MD, patient is known to me.     Patient presents for results review. She was last seen by me one month ago for left ankle pain. She had non-acute ankle x-ray. She had elevated uric acid level to 6.2 with no history of gout. She had normal A1C.     Today in clinic she reports that ankle pain is resolved. Swelling is improved. She has been watching her diet closely.     She also reports two new problems.   Sore throat - her daughter was just diagnosed with strep throat, she would like to get tested -her throat is scratchy, no fever, no chills, no GI or derm symptoms. No bleeding   She also has a mass to the left upper arm that has been present for years - un changed, no pain. Wants to know what it is. Will get US - likely this is a lipoma.     Past Medical History:  Past Medical History:   Diagnosis Date    Cataract     Diabetes mellitus type II, controlled     Glaucoma     Hypertension     Idiopathic ventricular tachycardia     Obese     Ocular hypertension     Overactive bladder     Thyroid cyst        Home Medications:  Prior to Admission medications    Medication Sig Start Date End Date Taking? Authorizing Provider   aspirin (ECOTRIN) 81 MG EC tablet Take 1 tablet (81 mg total) by mouth once daily. 1/13/21 6/22/23 Yes Tahira Mistry MD   B-complex with vitamin C (SUPER B COMPLEX-VITAMIN C) tablet Take 1 tablet by mouth once daily. 1/15/21  Yes Tahira Mistry MD   calcium-vitamin D3 500 mg(1,250mg) -200 unit per tablet Take 1 tablet by mouth once daily.   Yes Historical Provider   cranberry 500 mg Cap Take 1 capsule by mouth once daily. 1/13/21  Yes Tahira Mistry MD   dorzolamide (TRUSOPT) 2 % ophthalmic solution Place 1 drop into both eyes 3 (three) times daily. 2/27/23  Yes Supriya  MD Kristyn   fesoterodine (TOVIAZ) 8 mg Tb24 Take 1 tablet by mouth once daily.   Yes Historical Provider   FLAXSEED ORAL Take 1 tablet by mouth once daily at 6am.    Yes Historical Provider   hydroCHLOROthiazide (HYDRODIURIL) 25 MG tablet Take 1 tablet (25 mg total) by mouth once daily. 12/15/22  Yes Tahira Mistry MD   latanoprost 0.005 % ophthalmic solution Place 1 drop into both eyes every evening. 4/24/23  Yes Supriya Simons MD   metFORMIN (GLUCOPHAGE) 500 MG tablet Take 1 tablet (500 mg total) by mouth daily with breakfast. 5/18/22 6/22/23 Yes Tahira Mistry MD   multivitamin capsule Take 1 capsule by mouth once daily.   Yes Historical Provider   turmeric root extract 500 mg Cap Take 1 each by mouth once daily. 1/13/21  Yes Tahira Mistry MD   valsartan (DIOVAN) 160 MG tablet Take 1 tablet (160 mg total) by mouth once daily. 5/18/22 6/22/23 Yes Tahira Mistry MD   verapamiL (CALAN-SR) 180 MG CR tablet TAKE 1 TABLET EVERY EVENING 9/12/22  Yes Gerhard Cohen MD       Review of Systems:  Review of Systems   Constitutional:  Negative for chills and fever.   HENT:  Positive for sore throat. Negative for trouble swallowing.    Eyes:  Negative for visual disturbance.   Respiratory:  Negative for cough and shortness of breath.    Cardiovascular:  Negative for chest pain.   Gastrointestinal:  Negative for abdominal pain, constipation, diarrhea, nausea and vomiting.   Genitourinary:  Negative for dysuria and flank pain.   Musculoskeletal:  Negative for back pain, neck pain and neck stiffness.        Ankle pain    Skin:  Negative for rash.        Mass in left upper arm    Neurological:  Negative for dizziness, syncope, weakness and headaches.   Psychiatric/Behavioral:  Negative for confusion.      Health Maintainence:   Immunizations:  Health Maintenance         Date Due Completion Date    Shingles Vaccine (1 of 2) Never done ---    COVID-19 Vaccine (6 - Pfizer series) 06/02/2023 2/2/2023    Mammogram 06/19/2023  "12/19/2022    Override on 12/28/2020: Done    Override on 10/17/2019: Done    Diabetes Urine Screening 11/15/2023 11/15/2022    Foot Exam 11/15/2023 11/15/2022    Override on 7/25/2019: Done    Override on 5/22/2018: Done    Hemoglobin A1c 11/25/2023 5/25/2023    Override on 5/8/2018: Done    DEXA Scan 12/18/2023 12/18/2020    Override on 6/1/2015: Done    Lipid Panel 02/01/2024 2/1/2023    Override on 9/16/2021: Done    Override on 4/20/2018: Done    Override on 10/1/2016: Done    Eye Exam 02/27/2024 2/27/2023    Override on 11/13/2020: Done    Override on 4/23/2018: Done    Colorectal Cancer Screening 06/07/2024 6/7/2021    TETANUS VACCINE 10/11/2030 10/11/2020             PHYSICAL EXAM     /70   Pulse 77   Ht 5' 3" (1.6 m)   Wt 92.5 kg (203 lb 14.8 oz)   SpO2 99%   BMI 36.12 kg/m²     Physical Exam  Vitals and nursing note reviewed.   Constitutional:       Appearance: Normal appearance.      Comments: Healthy appearing female in NAD or apparent pain. She makes good eye contact, speaks in clear full sentences and ambulates with ease.        HENT:      Head: Normocephalic and atraumatic.      Nose: Nose normal.      Mouth/Throat:      Pharynx: Oropharynx is clear.      Comments: Difficult to see posterior oropharyngeal tissue 2/2 large body habitus   Eyes:      Conjunctiva/sclera: Conjunctivae normal.   Neck:      Comments: No lymphadenopathy   Cardiovascular:      Rate and Rhythm: Normal rate and regular rhythm.      Pulses: Normal pulses.   Pulmonary:      Effort: No respiratory distress.   Abdominal:      Tenderness: There is no abdominal tenderness.   Musculoskeletal:         General: Normal range of motion.      Cervical back: No rigidity.   Skin:     General: Skin is warm and dry.      Capillary Refill: Capillary refill takes less than 2 seconds.      Findings: No rash.      Comments: Left arm has soft tissue mass to the upper arm.  Non-tender  No erythema   Not-mobile.      Neurological:      " General: No focal deficit present.      Mental Status: She is alert.      Gait: Gait normal.   Psychiatric:         Mood and Affect: Mood normal.       LABS     Lab Results   Component Value Date    HGBA1C 6.0 (H) 05/25/2023     CMP  Sodium   Date Value Ref Range Status   05/25/2023 140 136 - 145 mmol/L Final     Potassium   Date Value Ref Range Status   05/25/2023 4.1 3.5 - 5.1 mmol/L Final     Chloride   Date Value Ref Range Status   05/25/2023 106 95 - 110 mmol/L Final     CO2   Date Value Ref Range Status   05/25/2023 28 23 - 29 mmol/L Final     Glucose   Date Value Ref Range Status   05/25/2023 89 70 - 110 mg/dL Final     BUN   Date Value Ref Range Status   05/25/2023 16 8 - 23 mg/dL Final     Creatinine   Date Value Ref Range Status   05/25/2023 0.8 0.5 - 1.4 mg/dL Final     Calcium   Date Value Ref Range Status   05/25/2023 10.6 (H) 8.7 - 10.5 mg/dL Final     Total Protein   Date Value Ref Range Status   05/25/2023 8.3 6.0 - 8.4 g/dL Final     Albumin   Date Value Ref Range Status   05/25/2023 4.1 3.5 - 5.2 g/dL Final     Total Bilirubin   Date Value Ref Range Status   05/25/2023 0.4 0.1 - 1.0 mg/dL Final     Comment:     For infants and newborns, interpretation of results should be based  on gestational age, weight and in agreement with clinical  observations.    Premature Infant recommended reference ranges:  Up to 24 hours.............<8.0 mg/dL  Up to 48 hours............<12.0 mg/dL  3-5 days..................<15.0 mg/dL  6-29 days.................<15.0 mg/dL       Alkaline Phosphatase   Date Value Ref Range Status   05/25/2023 65 55 - 135 U/L Final     AST   Date Value Ref Range Status   05/25/2023 21 10 - 40 U/L Final     ALT   Date Value Ref Range Status   05/25/2023 27 10 - 44 U/L Final     Anion Gap   Date Value Ref Range Status   05/25/2023 6 (L) 8 - 16 mmol/L Final     eGFR if    Date Value Ref Range Status   11/16/2020 >60 >60 mL/min/1.73 m^2 Final     eGFR if non African American    Date Value Ref Range Status   11/16/2020 >60 >60 mL/min/1.73 m^2 Final     Comment:     Calculation used to obtain the estimated glomerular filtration  rate (eGFR) is the CKD-EPI equation.        Lab Results   Component Value Date    WBC 5.63 11/16/2020    HGB 12.4 11/16/2020    HCT 39.7 11/16/2020    MCV 90 11/16/2020     11/16/2020     Lab Results   Component Value Date    CHOL 186 11/16/2020    CHOL 180 07/25/2019     Lab Results   Component Value Date    HDL 59 11/16/2020    HDL 59 07/25/2019     Lab Results   Component Value Date    LDLCALC 105.0 11/16/2020    LDLCALC 104.2 07/25/2019     Lab Results   Component Value Date    TRIG 110 11/16/2020    TRIG 84 07/25/2019     Lab Results   Component Value Date    CHOLHDL 31.7 11/16/2020    CHOLHDL 32.8 07/25/2019     Lab Results   Component Value Date    TSH 0.847 11/16/2020       ASSESSMENT/PLAN     Jennifer Motley is a 72 y.o. female     Jennifer was seen today for ankle pain.    Diagnoses and all orders for this visit:    Pharyngitis, unspecified etiology -NEW  -     POCT Rapid Strep A    Arm mass, left -NEW  -     US Soft Tissue Upper Extremity, Left; Future    Elevated blood uric acid level  -     URIC ACID; Future    Chronic pain of left ankle          Henny Farfan PA-C   Department of Internal Medicine - [facility]   10:08 AM

## 2023-07-03 ENCOUNTER — HOSPITAL ENCOUNTER (OUTPATIENT)
Dept: RADIOLOGY | Facility: OTHER | Age: 73
Discharge: HOME | End: 2023-07-03
Attending: PHYSICIAN ASSISTANT
Payer: MEDICARE

## 2023-07-03 DIAGNOSIS — R22.32 ARM MASS, LEFT: ICD-10-CM

## 2023-07-03 PROCEDURE — 76882 US LMTD JT/FCL EVL NVASC XTR: CPT | Mod: TC,HCNC,LT

## 2023-07-03 PROCEDURE — 76882 US LMTD JT/FCL EVL NVASC XTR: CPT | Mod: 26,HCNC,LT, | Performed by: RADIOLOGY

## 2023-07-03 PROCEDURE — 76882 US SOFT TISSUE, UPPER EXTREMITY, LEFT: ICD-10-PCS | Mod: 26,HCNC,LT, | Performed by: RADIOLOGY

## 2023-07-17 ENCOUNTER — HOSPITAL ENCOUNTER (OUTPATIENT)
Dept: CARDIOLOGY | Facility: CLINIC | Age: 73
Discharge: HOME | End: 2023-07-17
Payer: MEDICARE

## 2023-07-17 DIAGNOSIS — I47.29 IDIOPATHIC VENTRICULAR TACHYCARDIA: ICD-10-CM

## 2023-07-17 PROCEDURE — 93010 RHYTHM STRIP: ICD-10-PCS | Mod: HCNC,S$GLB,, | Performed by: INTERNAL MEDICINE

## 2023-07-17 PROCEDURE — 93010 ELECTROCARDIOGRAM REPORT: CPT | Mod: HCNC,S$GLB,, | Performed by: INTERNAL MEDICINE

## 2023-07-17 PROCEDURE — 93005 ELECTROCARDIOGRAM TRACING: CPT | Mod: HCNC,S$GLB,, | Performed by: INTERNAL MEDICINE

## 2023-07-17 PROCEDURE — 93005 RHYTHM STRIP: ICD-10-PCS | Mod: HCNC,S$GLB,, | Performed by: INTERNAL MEDICINE

## 2023-07-18 ENCOUNTER — OFFICE VISIT (OUTPATIENT)
Dept: ELECTROPHYSIOLOGY | Facility: CLINIC | Age: 73
End: 2023-07-18
Payer: MEDICARE

## 2023-07-18 ENCOUNTER — CLINICAL SUPPORT (OUTPATIENT)
Dept: CARDIOLOGY | Facility: HOSPITAL | Age: 73
End: 2023-07-18
Payer: MEDICARE

## 2023-07-18 VITALS
WEIGHT: 201 LBS | HEIGHT: 63 IN | HEART RATE: 68 BPM | DIASTOLIC BLOOD PRESSURE: 70 MMHG | BODY MASS INDEX: 35.61 KG/M2 | SYSTOLIC BLOOD PRESSURE: 130 MMHG

## 2023-07-18 DIAGNOSIS — Z95.818 PRESENCE OF OTHER CARDIAC IMPLANTS AND GRAFTS: ICD-10-CM

## 2023-07-18 DIAGNOSIS — E66.01 SEVERE OBESITY (BMI 35.0-39.9) WITH COMORBIDITY: ICD-10-CM

## 2023-07-18 DIAGNOSIS — E11.42 TYPE 2 DIABETES MELLITUS WITH DIABETIC POLYNEUROPATHY, UNSPECIFIED WHETHER LONG TERM INSULIN USE: ICD-10-CM

## 2023-07-18 DIAGNOSIS — I47.29 IDIOPATHIC VENTRICULAR TACHYCARDIA: Primary | Chronic | ICD-10-CM

## 2023-07-18 DIAGNOSIS — I10 ESSENTIAL HYPERTENSION: ICD-10-CM

## 2023-07-18 PROCEDURE — 93298 CARDIAC DEVICE CHECK - REMOTE: ICD-10-PCS | Mod: HCNC,,, | Performed by: INTERNAL MEDICINE

## 2023-07-18 PROCEDURE — 3075F PR MOST RECENT SYSTOLIC BLOOD PRESS GE 130-139MM HG: ICD-10-PCS | Mod: HCNC,CPTII,95, | Performed by: INTERNAL MEDICINE

## 2023-07-18 PROCEDURE — 3078F DIAST BP <80 MM HG: CPT | Mod: HCNC,CPTII,95, | Performed by: INTERNAL MEDICINE

## 2023-07-18 PROCEDURE — 1101F PT FALLS ASSESS-DOCD LE1/YR: CPT | Mod: HCNC,CPTII,95, | Performed by: INTERNAL MEDICINE

## 2023-07-18 PROCEDURE — 1101F PR PT FALLS ASSESS DOC 0-1 FALLS W/OUT INJ PAST YR: ICD-10-PCS | Mod: HCNC,CPTII,95, | Performed by: INTERNAL MEDICINE

## 2023-07-18 PROCEDURE — 1126F AMNT PAIN NOTED NONE PRSNT: CPT | Mod: HCNC,CPTII,95, | Performed by: INTERNAL MEDICINE

## 2023-07-18 PROCEDURE — 93298 REM INTERROG DEV EVAL SCRMS: CPT | Mod: HCNC,,, | Performed by: INTERNAL MEDICINE

## 2023-07-18 PROCEDURE — 3288F PR FALLS RISK ASSESSMENT DOCUMENTED: ICD-10-PCS | Mod: HCNC,CPTII,95, | Performed by: INTERNAL MEDICINE

## 2023-07-18 PROCEDURE — 1159F MED LIST DOCD IN RCRD: CPT | Mod: HCNC,CPTII,95, | Performed by: INTERNAL MEDICINE

## 2023-07-18 PROCEDURE — 4010F ACE/ARB THERAPY RXD/TAKEN: CPT | Mod: HCNC,CPTII,95, | Performed by: INTERNAL MEDICINE

## 2023-07-18 PROCEDURE — 3008F BODY MASS INDEX DOCD: CPT | Mod: HCNC,CPTII,95, | Performed by: INTERNAL MEDICINE

## 2023-07-18 PROCEDURE — 3078F PR MOST RECENT DIASTOLIC BLOOD PRESSURE < 80 MM HG: ICD-10-PCS | Mod: HCNC,CPTII,95, | Performed by: INTERNAL MEDICINE

## 2023-07-18 PROCEDURE — 99213 PR OFFICE/OUTPT VISIT, EST, LEVL III, 20-29 MIN: ICD-10-PCS | Mod: HCNC,95,, | Performed by: INTERNAL MEDICINE

## 2023-07-18 PROCEDURE — 99213 OFFICE O/P EST LOW 20 MIN: CPT | Mod: HCNC,95,, | Performed by: INTERNAL MEDICINE

## 2023-07-18 PROCEDURE — 3044F PR MOST RECENT HEMOGLOBIN A1C LEVEL <7.0%: ICD-10-PCS | Mod: HCNC,CPTII,95, | Performed by: INTERNAL MEDICINE

## 2023-07-18 PROCEDURE — 1159F PR MEDICATION LIST DOCUMENTED IN MEDICAL RECORD: ICD-10-PCS | Mod: HCNC,CPTII,95, | Performed by: INTERNAL MEDICINE

## 2023-07-18 PROCEDURE — 4010F PR ACE/ARB THEARPY RXD/TAKEN: ICD-10-PCS | Mod: HCNC,CPTII,95, | Performed by: INTERNAL MEDICINE

## 2023-07-18 PROCEDURE — 3288F FALL RISK ASSESSMENT DOCD: CPT | Mod: HCNC,CPTII,95, | Performed by: INTERNAL MEDICINE

## 2023-07-18 PROCEDURE — G2066 INTER DEVC REMOTE 30D: HCPCS | Mod: HCNC | Performed by: INTERNAL MEDICINE

## 2023-07-18 PROCEDURE — 1126F PR PAIN SEVERITY QUANTIFIED, NO PAIN PRESENT: ICD-10-PCS | Mod: HCNC,CPTII,95, | Performed by: INTERNAL MEDICINE

## 2023-07-18 PROCEDURE — 3008F PR BODY MASS INDEX (BMI) DOCUMENTED: ICD-10-PCS | Mod: HCNC,CPTII,95, | Performed by: INTERNAL MEDICINE

## 2023-07-18 PROCEDURE — 3075F SYST BP GE 130 - 139MM HG: CPT | Mod: HCNC,CPTII,95, | Performed by: INTERNAL MEDICINE

## 2023-07-18 PROCEDURE — 3044F HG A1C LEVEL LT 7.0%: CPT | Mod: HCNC,CPTII,95, | Performed by: INTERNAL MEDICINE

## 2023-07-18 NOTE — PROGRESS NOTES
Subjective:    Patient ID:  Jennifer Motley is a 72 y.o. female who presents for evaluation of Idiopathic VT    The patient location is: [place], [place]  The chief complaint leading to consultation is: ILR/VT    Visit type: audiovisual    Face to Face time with patient: 5 minutes  15 minutes of total time spent on the encounter, which includes face to face time and non-face to face time preparing to see the patient (eg, review of tests), Obtaining and/or reviewing separately obtained history, Documenting clinical information in the electronic or other health record, Independently interpreting results (not separately reported) and communicating results to the patient/family/caregiver, or Care coordination (not separately reported).         Each patient to whom he or she provides medical services by telemedicine is:  (1) informed of the relationship between the physician and patient and the respective role of any other health care provider with respect to management of the patient; and (2) notified that he or she may decline to receive medical services by telemedicine and may withdraw from such care at any time.    Notes:       Referring Cardiologist: Verónica Turk MD  Primary Care Physician: Jaqueline Byrnes MD    HPI  Prior Hx:  I had the pleasure of seeing Mrs. Motley today in our electrophysiology clinic in consultation for her ventricular arrhythmia. As you are aware she is a pleasant 72 year-old woman with obesity, hypertension and type 2 diabetes mellitus. She was first assessed by Dr. Turk in 2017 when she presented for evaluation of atypical chest pain. A stress echocardiogram was performed noting a normal LVEF, no arrhythmias during exercise, no ischemia, and a hypertensive response at peak stress (SBP 217mmHg). She was seen by Dr. Byrnes 7/2019 and reported burning type chest discomfort during stressful situations. A treadmill stress echocardiogram was ordered and performed on 8/20/2019 which  showed a preserved LVEF and no ischemia however with peak exercise she developed PVCs, salvos of monomorphic VT and 1 34-beat run of monomorphic VT all of the same etiology of the PVCs (LBBB, V3 transition, inferiorly directed). She was asymptomatic from this event during her stress test. She denies any history of near syncope/syncope. She denies palpitations or exertional chest discomfort/palpitations/light-headedness. She regularly exercises on a treadmill. After discussion she elected to proceed with ILR implantation.    I reviewed available ECGs in Epic including the stress tests. ECGs consistent with sinus rhythm with normal QRS and intervals. PVCs/VT only noted on recent stress test.    ILR was implanted 11/2019. She presented for follow-up visit 8/2020. No VT noted thus far on monitoring.    10/2022: Mrs. Motley returned for yearly follow-up. She recently retired. She unfortunately lost her  and 2 brothers in the past year.     Interim Hx:  Mrs Motley returns for virtual follow-up. ILR has reached RRT. ILR monitoring over the past year shows no sustained arrhythmias    Recent in clinic ECG is normal sinus rhythm with normal intervals.    Review of Systems   Constitutional: Negative for fever and malaise/fatigue.   HENT:  Negative for congestion and sore throat.    Eyes:  Negative for blurred vision and visual disturbance.   Cardiovascular:  Negative for chest pain, dyspnea on exertion, irregular heartbeat, leg swelling, near-syncope, orthopnea, palpitations, paroxysmal nocturnal dyspnea and syncope.   Respiratory:  Negative for cough and shortness of breath.    Hematologic/Lymphatic: Negative for bleeding problem. Does not bruise/bleed easily.   Skin: Negative.    Musculoskeletal: Negative.    Gastrointestinal:  Negative for bloating, abdominal pain and constipation.   Neurological:  Negative for dizziness and focal weakness.      Objective:    Physical Exam  Constitutional:       Appearance: Normal  appearance.   Neurological:      Mental Status: She is alert and oriented to person, place, and time.   Psychiatric:         Mood and Affect: Mood normal.         Behavior: Behavior normal.         Thought Content: Thought content normal.         Judgment: Judgment normal.         Assessment:       1. Idiopathic ventricular tachycardia    2. Essential hypertension    3. Severe obesity (BMI 35.0-39.9) with comorbidity    4. Type 2 diabetes mellitus with diabetic polyneuropathy, unspecified whether long term insulin use         Plan:       In summary, Mrs. Motley is a pleasant 72 year-old woman with obesity, hypertension and type 2 diabetes mellitus presenting for evaluation of PVCs and idiopathic VT of the same etiology on an exercise stress test performed for chest discomfort aypical for angina/ischemia. She has no evidence of ischemia on the stress test and has a preserved LVEF. She has no history of near syncope or syncope. Her PVC/VT focus is consistent with an outflow tract source, with a V2 transition ratio favoring RVOT. Discussed at initial visit prognosis and morbidity with idiopathic VT (can carry a risk of syncope however overall low risk of increased mortality) and treatment options. She desired conservative management with verapamil/ILR implant. To date no VT observed on ILR. Device has reached RRT. Discussed options. She elects to abandon.      RTC in 1 year.    Thank you for allowing me to participate in the care of this patient. Please do not hesitate to call me with any questions or concerns.    Gerhard Cohen MD, PhD  Cardiac Electrophysiology

## 2023-08-23 ENCOUNTER — LAB VISIT (OUTPATIENT)
Dept: LAB | Facility: OTHER | Age: 73
End: 2023-08-23
Payer: MEDICARE

## 2023-08-23 ENCOUNTER — OFFICE VISIT (OUTPATIENT)
Dept: INTERNAL MEDICINE | Facility: CLINIC | Age: 73
End: 2023-08-23
Payer: MEDICARE

## 2023-08-23 VITALS
WEIGHT: 201.19 LBS | DIASTOLIC BLOOD PRESSURE: 60 MMHG | SYSTOLIC BLOOD PRESSURE: 128 MMHG | BODY MASS INDEX: 35.64 KG/M2 | HEART RATE: 82 BPM | OXYGEN SATURATION: 98 %

## 2023-08-23 DIAGNOSIS — E53.8 VITAMIN B12 DEFICIENCY: ICD-10-CM

## 2023-08-23 DIAGNOSIS — Z91.89 FRAMINGHAM CARDIAC RISK >20% IN NEXT 10 YEARS: ICD-10-CM

## 2023-08-23 DIAGNOSIS — R01.1 NEWLY RECOGNIZED HEART MURMUR: ICD-10-CM

## 2023-08-23 DIAGNOSIS — E55.9 VITAMIN D DEFICIENCY: ICD-10-CM

## 2023-08-23 DIAGNOSIS — Z00.00 HEALTH MAINTENANCE EXAMINATION: ICD-10-CM

## 2023-08-23 DIAGNOSIS — Z00.00 HEALTH MAINTENANCE EXAMINATION: Primary | ICD-10-CM

## 2023-08-23 DIAGNOSIS — E11.9 CONTROLLED TYPE 2 DIABETES MELLITUS WITHOUT COMPLICATION, WITHOUT LONG-TERM CURRENT USE OF INSULIN: ICD-10-CM

## 2023-08-23 DIAGNOSIS — M85.80 OSTEOPENIA, UNSPECIFIED LOCATION: ICD-10-CM

## 2023-08-23 DIAGNOSIS — I10 ESSENTIAL HYPERTENSION: ICD-10-CM

## 2023-08-23 DIAGNOSIS — Z12.31 SCREENING MAMMOGRAM FOR BREAST CANCER: ICD-10-CM

## 2023-08-23 LAB
ALBUMIN SERPL BCP-MCNC: 4.2 G/DL (ref 3.5–5.2)
ALBUMIN/CREAT UR: 6.4 UG/MG (ref 0–30)
ALP SERPL-CCNC: 71 U/L (ref 55–135)
ALT SERPL W/O P-5'-P-CCNC: 29 U/L (ref 10–44)
ANION GAP SERPL CALC-SCNC: 8 MMOL/L (ref 8–16)
AST SERPL-CCNC: 21 U/L (ref 10–40)
BASOPHILS # BLD AUTO: 0.02 K/UL (ref 0–0.2)
BASOPHILS NFR BLD: 0.3 % (ref 0–1.9)
BILIRUB SERPL-MCNC: 0.4 MG/DL (ref 0.1–1)
BUN SERPL-MCNC: 18 MG/DL (ref 8–23)
CALCIUM SERPL-MCNC: 10.5 MG/DL (ref 8.7–10.5)
CHLORIDE SERPL-SCNC: 106 MMOL/L (ref 95–110)
CHOLEST SERPL-MCNC: 162 MG/DL (ref 120–199)
CHOLEST/HDLC SERPL: 3.1 {RATIO} (ref 2–5)
CO2 SERPL-SCNC: 26 MMOL/L (ref 23–29)
CREAT SERPL-MCNC: 0.8 MG/DL (ref 0.5–1.4)
CREAT UR-MCNC: 125.9 MG/DL (ref 15–325)
DIFFERENTIAL METHOD: ABNORMAL
EOSINOPHIL # BLD AUTO: 0.1 K/UL (ref 0–0.5)
EOSINOPHIL NFR BLD: 1.7 % (ref 0–8)
ERYTHROCYTE [DISTWIDTH] IN BLOOD BY AUTOMATED COUNT: 14.8 % (ref 11.5–14.5)
EST. GFR  (NO RACE VARIABLE): >60 ML/MIN/1.73 M^2
ESTIMATED AVG GLUCOSE: 128 MG/DL (ref 68–131)
GLUCOSE SERPL-MCNC: 83 MG/DL (ref 70–110)
HBA1C MFR BLD: 6.1 % (ref 4–5.6)
HCT VFR BLD AUTO: 37.4 % (ref 37–48.5)
HDLC SERPL-MCNC: 53 MG/DL (ref 40–75)
HDLC SERPL: 32.7 % (ref 20–50)
HGB BLD-MCNC: 11.8 G/DL (ref 12–16)
IMM GRANULOCYTES # BLD AUTO: 0.02 K/UL (ref 0–0.04)
IMM GRANULOCYTES NFR BLD AUTO: 0.3 % (ref 0–0.5)
LDLC SERPL CALC-MCNC: 90.8 MG/DL (ref 63–159)
LYMPHOCYTES # BLD AUTO: 2.3 K/UL (ref 1–4.8)
LYMPHOCYTES NFR BLD: 34.9 % (ref 18–48)
MCH RBC QN AUTO: 29.1 PG (ref 27–31)
MCHC RBC AUTO-ENTMCNC: 31.6 G/DL (ref 32–36)
MCV RBC AUTO: 92 FL (ref 82–98)
MICROALBUMIN UR DL<=1MG/L-MCNC: 8 UG/ML
MONOCYTES # BLD AUTO: 0.5 K/UL (ref 0.3–1)
MONOCYTES NFR BLD: 7.2 % (ref 4–15)
NEUTROPHILS # BLD AUTO: 3.7 K/UL (ref 1.8–7.7)
NEUTROPHILS NFR BLD: 55.6 % (ref 38–73)
NONHDLC SERPL-MCNC: 109 MG/DL
NRBC BLD-RTO: 0 /100 WBC
PLATELET # BLD AUTO: 200 K/UL (ref 150–450)
PMV BLD AUTO: 11.8 FL (ref 9.2–12.9)
POTASSIUM SERPL-SCNC: 3.9 MMOL/L (ref 3.5–5.1)
PROT SERPL-MCNC: 8.3 G/DL (ref 6–8.4)
RBC # BLD AUTO: 4.05 M/UL (ref 4–5.4)
SODIUM SERPL-SCNC: 140 MMOL/L (ref 136–145)
TRIGL SERPL-MCNC: 91 MG/DL (ref 30–150)
TSH SERPL DL<=0.005 MIU/L-ACNC: 0.97 UIU/ML (ref 0.4–4)
WBC # BLD AUTO: 6.57 K/UL (ref 3.9–12.7)

## 2023-08-23 PROCEDURE — 80061 LIPID PANEL: CPT | Mod: HCNC | Performed by: STUDENT IN AN ORGANIZED HEALTH CARE EDUCATION/TRAINING PROGRAM

## 2023-08-23 PROCEDURE — 4010F PR ACE/ARB THEARPY RXD/TAKEN: ICD-10-PCS | Mod: HCNC,CPTII,S$GLB, | Performed by: STUDENT IN AN ORGANIZED HEALTH CARE EDUCATION/TRAINING PROGRAM

## 2023-08-23 PROCEDURE — 3074F SYST BP LT 130 MM HG: CPT | Mod: HCNC,CPTII,S$GLB, | Performed by: STUDENT IN AN ORGANIZED HEALTH CARE EDUCATION/TRAINING PROGRAM

## 2023-08-23 PROCEDURE — 99999 PR PBB SHADOW E&M-EST. PATIENT-LVL IV: CPT | Mod: PBBFAC,HCNC,, | Performed by: STUDENT IN AN ORGANIZED HEALTH CARE EDUCATION/TRAINING PROGRAM

## 2023-08-23 PROCEDURE — 82306 VITAMIN D 25 HYDROXY: CPT | Mod: HCNC | Performed by: STUDENT IN AN ORGANIZED HEALTH CARE EDUCATION/TRAINING PROGRAM

## 2023-08-23 PROCEDURE — 1160F PR REVIEW ALL MEDS BY PRESCRIBER/CLIN PHARMACIST DOCUMENTED: ICD-10-PCS | Mod: HCNC,CPTII,S$GLB, | Performed by: STUDENT IN AN ORGANIZED HEALTH CARE EDUCATION/TRAINING PROGRAM

## 2023-08-23 PROCEDURE — 1101F PT FALLS ASSESS-DOCD LE1/YR: CPT | Mod: HCNC,CPTII,S$GLB, | Performed by: STUDENT IN AN ORGANIZED HEALTH CARE EDUCATION/TRAINING PROGRAM

## 2023-08-23 PROCEDURE — 1159F PR MEDICATION LIST DOCUMENTED IN MEDICAL RECORD: ICD-10-PCS | Mod: HCNC,CPTII,S$GLB, | Performed by: STUDENT IN AN ORGANIZED HEALTH CARE EDUCATION/TRAINING PROGRAM

## 2023-08-23 PROCEDURE — 99999 PR PBB SHADOW E&M-EST. PATIENT-LVL IV: ICD-10-PCS | Mod: PBBFAC,HCNC,, | Performed by: STUDENT IN AN ORGANIZED HEALTH CARE EDUCATION/TRAINING PROGRAM

## 2023-08-23 PROCEDURE — 4010F ACE/ARB THERAPY RXD/TAKEN: CPT | Mod: HCNC,CPTII,S$GLB, | Performed by: STUDENT IN AN ORGANIZED HEALTH CARE EDUCATION/TRAINING PROGRAM

## 2023-08-23 PROCEDURE — 3008F PR BODY MASS INDEX (BMI) DOCUMENTED: ICD-10-PCS | Mod: HCNC,CPTII,S$GLB, | Performed by: STUDENT IN AN ORGANIZED HEALTH CARE EDUCATION/TRAINING PROGRAM

## 2023-08-23 PROCEDURE — 3044F PR MOST RECENT HEMOGLOBIN A1C LEVEL <7.0%: ICD-10-PCS | Mod: HCNC,CPTII,S$GLB, | Performed by: STUDENT IN AN ORGANIZED HEALTH CARE EDUCATION/TRAINING PROGRAM

## 2023-08-23 PROCEDURE — 84443 ASSAY THYROID STIM HORMONE: CPT | Mod: HCNC | Performed by: STUDENT IN AN ORGANIZED HEALTH CARE EDUCATION/TRAINING PROGRAM

## 2023-08-23 PROCEDURE — 82607 VITAMIN B-12: CPT | Mod: HCNC | Performed by: STUDENT IN AN ORGANIZED HEALTH CARE EDUCATION/TRAINING PROGRAM

## 2023-08-23 PROCEDURE — 1126F AMNT PAIN NOTED NONE PRSNT: CPT | Mod: HCNC,CPTII,S$GLB, | Performed by: STUDENT IN AN ORGANIZED HEALTH CARE EDUCATION/TRAINING PROGRAM

## 2023-08-23 PROCEDURE — 80053 COMPREHEN METABOLIC PANEL: CPT | Mod: HCNC | Performed by: STUDENT IN AN ORGANIZED HEALTH CARE EDUCATION/TRAINING PROGRAM

## 2023-08-23 PROCEDURE — 3078F DIAST BP <80 MM HG: CPT | Mod: HCNC,CPTII,S$GLB, | Performed by: STUDENT IN AN ORGANIZED HEALTH CARE EDUCATION/TRAINING PROGRAM

## 2023-08-23 PROCEDURE — 3074F PR MOST RECENT SYSTOLIC BLOOD PRESSURE < 130 MM HG: ICD-10-PCS | Mod: HCNC,CPTII,S$GLB, | Performed by: STUDENT IN AN ORGANIZED HEALTH CARE EDUCATION/TRAINING PROGRAM

## 2023-08-23 PROCEDURE — 82570 ASSAY OF URINE CREATININE: CPT | Mod: HCNC | Performed by: STUDENT IN AN ORGANIZED HEALTH CARE EDUCATION/TRAINING PROGRAM

## 2023-08-23 PROCEDURE — 85025 COMPLETE CBC W/AUTO DIFF WBC: CPT | Mod: HCNC | Performed by: STUDENT IN AN ORGANIZED HEALTH CARE EDUCATION/TRAINING PROGRAM

## 2023-08-23 PROCEDURE — 83036 HEMOGLOBIN GLYCOSYLATED A1C: CPT | Mod: HCNC | Performed by: STUDENT IN AN ORGANIZED HEALTH CARE EDUCATION/TRAINING PROGRAM

## 2023-08-23 PROCEDURE — 1159F MED LIST DOCD IN RCRD: CPT | Mod: HCNC,CPTII,S$GLB, | Performed by: STUDENT IN AN ORGANIZED HEALTH CARE EDUCATION/TRAINING PROGRAM

## 2023-08-23 PROCEDURE — 3288F PR FALLS RISK ASSESSMENT DOCUMENTED: ICD-10-PCS | Mod: HCNC,CPTII,S$GLB, | Performed by: STUDENT IN AN ORGANIZED HEALTH CARE EDUCATION/TRAINING PROGRAM

## 2023-08-23 PROCEDURE — 3044F HG A1C LEVEL LT 7.0%: CPT | Mod: HCNC,CPTII,S$GLB, | Performed by: STUDENT IN AN ORGANIZED HEALTH CARE EDUCATION/TRAINING PROGRAM

## 2023-08-23 PROCEDURE — 1126F PR PAIN SEVERITY QUANTIFIED, NO PAIN PRESENT: ICD-10-PCS | Mod: HCNC,CPTII,S$GLB, | Performed by: STUDENT IN AN ORGANIZED HEALTH CARE EDUCATION/TRAINING PROGRAM

## 2023-08-23 PROCEDURE — 3078F PR MOST RECENT DIASTOLIC BLOOD PRESSURE < 80 MM HG: ICD-10-PCS | Mod: HCNC,CPTII,S$GLB, | Performed by: STUDENT IN AN ORGANIZED HEALTH CARE EDUCATION/TRAINING PROGRAM

## 2023-08-23 PROCEDURE — 1101F PR PT FALLS ASSESS DOC 0-1 FALLS W/OUT INJ PAST YR: ICD-10-PCS | Mod: HCNC,CPTII,S$GLB, | Performed by: STUDENT IN AN ORGANIZED HEALTH CARE EDUCATION/TRAINING PROGRAM

## 2023-08-23 PROCEDURE — 99214 PR OFFICE/OUTPT VISIT, EST, LEVL IV, 30-39 MIN: ICD-10-PCS | Mod: HCNC,S$GLB,, | Performed by: STUDENT IN AN ORGANIZED HEALTH CARE EDUCATION/TRAINING PROGRAM

## 2023-08-23 PROCEDURE — 36415 COLL VENOUS BLD VENIPUNCTURE: CPT | Mod: HCNC | Performed by: STUDENT IN AN ORGANIZED HEALTH CARE EDUCATION/TRAINING PROGRAM

## 2023-08-23 PROCEDURE — 3008F BODY MASS INDEX DOCD: CPT | Mod: HCNC,CPTII,S$GLB, | Performed by: STUDENT IN AN ORGANIZED HEALTH CARE EDUCATION/TRAINING PROGRAM

## 2023-08-23 PROCEDURE — 3288F FALL RISK ASSESSMENT DOCD: CPT | Mod: HCNC,CPTII,S$GLB, | Performed by: STUDENT IN AN ORGANIZED HEALTH CARE EDUCATION/TRAINING PROGRAM

## 2023-08-23 PROCEDURE — 99214 OFFICE O/P EST MOD 30 MIN: CPT | Mod: HCNC,S$GLB,, | Performed by: STUDENT IN AN ORGANIZED HEALTH CARE EDUCATION/TRAINING PROGRAM

## 2023-08-23 PROCEDURE — 1160F RVW MEDS BY RX/DR IN RCRD: CPT | Mod: HCNC,CPTII,S$GLB, | Performed by: STUDENT IN AN ORGANIZED HEALTH CARE EDUCATION/TRAINING PROGRAM

## 2023-08-23 RX ORDER — AMOXICILLIN 500 MG
CAPSULE ORAL DAILY
COMMUNITY

## 2023-08-23 NOTE — PROGRESS NOTES
Subjective:       Patient ID: Jennifer Motley is a 72 y.o. female.    Chief Complaint: Health maintenance examination [Z00.00]    Patient is new to me, here to establish care.    Health maintenance -   Cologuard negative JUNE2021.   Denies family history of colorectal cancer.  Mammogram BI-RADS 1 in DEC2022. Due for repeat in DEC2023.  History of abnormal mammogram.  Denies family history of breast cancer.  Denies family history of ovarian cancer.  Completed pap screening.  Denies history of abnormal pap smear.  Family history of cardiac disease.  UTD on Tdap, COVID primary/bivalent, PPSV23, PCV13 vaccinations.  Due for COVID bivalent, shingles vaccinations.  Never smoker.  Drinks alcohol 3-4 times weekly, 2-3 drinks per sitting.  Denies drug use.  Completed hepatitis C screening.   Aerobics for 30 min, walking, and yoga 30 mins twice weekly   Line dancing class on Tuesdays and Thursday    HTN -   Currently prescribed valsartan, verapamil, HCTZ.  Patient endorses taking medication as directed.  Denies side effects or concerns while taking medication.  Due for micro albumin creatinine ratio.   Lab Results       Component                Value               Date                       MICALBCREAT              10.0                11/15/2022            BP Readings from Last 5 Encounters:  07/18/23 : 130/70  06/22/23 : 122/70  05/25/23 : 125/62  02/02/23 : 116/62  12/15/22 : 132/72    T2DM -   Currently taking metformin and Rybelsus 3 mg daily  Following with Dr. Garsia routinely for endocrinology.  Taking ASA daily as directed   UTD on foot exam, last completed NOV2022.  UTD on eye exam, last completed FEB2023.  Lab Results       Component                Value               Date                       HGBA1C                   6.0 (H)             05/25/2023                 HGBA1C                   6.3 (H)             11/15/2022                 HGBA1C                   6.5 (H)             11/15/2021            Lab Results        Component                Value               Date                       MICALBCREAT              10.0                11/15/2022              Elevated ASCVD risk score -   Never previously started statin medication.  Due for lipid recheck.  Lab Results       Component                Value               Date                       CHOL                     186                 11/16/2020            Lab Results       Component                Value               Date                       TRIG                     110                 11/16/2020            Lab Results       Component                Value               Date                       LDLCALC                  105.0               11/16/2020            Lab Results       Component                Value               Date                       HDL                      59                  11/16/2020            The 10-year ASCVD risk score (Chris TATUM, et al., 2019) is: 26.9%    Osteopenia -   Vitamin D deficiency -   Completed DEXA in DEC2020.  Showed normal bone density.  Has repeat scheduled at  OCT2023.  Currently taking vitamin D3 daily.  Currently taking calcium daily.  Lab Results       Component                Value               Date                       [id number]             46                  11/16/2020            Family history of osteoporosis.     Vitamin B12 deficiency -  Currently taking B complex daily supplementation.    Heart murmur -  Following with Dr. Cohen routinely for cardiology.  Never previously with heart murmur  Denies associated signs/symptoms, no CP, palpitations, SOB, fatigue  Exercising routinely without difficulty  Aerobics for 30 min, walking, and yoga 30 mins twice weekly   Line dancing class on Tuesdays and Thursday  Last echo in 2019 without concerns identified         Review of Systems   Constitutional:  Negative for appetite change, chills, fatigue, fever and unexpected weight change.   Respiratory:  Negative for cough and  shortness of breath.    Cardiovascular:  Negative for chest pain, palpitations and leg swelling.   Gastrointestinal:  Negative for abdominal pain, constipation, diarrhea, nausea and vomiting.   Skin:  Negative for rash.   Neurological:  Negative for dizziness, syncope, weakness and headaches.         Current Outpatient Medications   Medication Instructions    aspirin (ECOTRIN) 81 mg, Oral, Daily    B-complex with vitamin C (SUPER B COMPLEX-VITAMIN C) tablet 1 tablet, Oral, Daily    calcium-vitamin D3 500 mg(1,250mg) -200 unit per tablet 1 tablet, Oral, Daily    cranberry 500 mg Cap 1 capsule, Oral, Daily    dorzolamide (TRUSOPT) 2 % ophthalmic solution 1 drop, Both Eyes, 3 times daily    FLAXSEED ORAL 1 tablet, Oral, Daily    hydroCHLOROthiazide (HYDRODIURIL) 25 mg, Oral, Daily    latanoprost 0.005 % ophthalmic solution 1 drop, Both Eyes, Nightly    metFORMIN (GLUCOPHAGE) 500 mg, Oral, With breakfast    multivitamin capsule 1 capsule, Oral, Daily    omega-3 fatty acids/fish oil (FISH OIL-OMEGA-3 FATTY ACIDS) 300-1,000 mg capsule Oral, Daily    semaglutide (RYBELSUS) 3 mg, Oral, Daily    turmeric root extract 500 mg Cap 1 each, Oral, Daily    valsartan (DIOVAN) 160 mg, Oral, Daily    verapamiL (CALAN-SR) 180 MG CR tablet TAKE 1 TABLET EVERY EVENING     Objective:      Vitals:    08/23/23 1348   BP: 128/60   Pulse: 82   SpO2: 98%   Weight: 91.3 kg (201 lb 2.7 oz)   PainSc: 0-No pain     Body mass index is 35.64 kg/m².    Physical Exam  Vitals reviewed.   Constitutional:       General: She is not in acute distress.     Appearance: Normal appearance. She is not ill-appearing or diaphoretic.   HENT:      Head: Normocephalic and atraumatic.      Right Ear: Tympanic membrane, ear canal and external ear normal. There is no impacted cerumen.      Left Ear: Tympanic membrane, ear canal and external ear normal. There is no impacted cerumen.      Nose: Nose normal. No rhinorrhea.      Mouth/Throat:      Mouth: Mucous membranes  are moist.      Pharynx: Oropharynx is clear. No oropharyngeal exudate or posterior oropharyngeal erythema.   Eyes:      General: No scleral icterus.        Right eye: No discharge.         Left eye: No discharge.      Conjunctiva/sclera: Conjunctivae normal.   Neck:      Thyroid: No thyromegaly or thyroid tenderness.      Trachea: Trachea normal.   Cardiovascular:      Rate and Rhythm: Normal rate and regular rhythm.      Heart sounds: Murmur heard.      Systolic murmur is present with a grade of 2/6.      No friction rub.   Pulmonary:      Effort: Pulmonary effort is normal. No respiratory distress.      Breath sounds: Normal breath sounds. No stridor. No wheezing, rhonchi or rales.   Abdominal:      General: There is no distension.      Palpations: Abdomen is soft.      Tenderness: There is no abdominal tenderness. There is no guarding or rebound.   Musculoskeletal:         General: No swelling or deformity.      Cervical back: Neck supple.   Lymphadenopathy:      Head:      Right side of head: No submandibular or posterior auricular adenopathy.      Left side of head: No submandibular or posterior auricular adenopathy.      Cervical: No cervical adenopathy.      Right cervical: No superficial, deep or posterior cervical adenopathy.     Left cervical: No superficial, deep or posterior cervical adenopathy.      Upper Body:      Right upper body: No supraclavicular adenopathy.      Left upper body: No supraclavicular adenopathy.   Skin:     General: Skin is warm and dry.   Neurological:      General: No focal deficit present.      Mental Status: She is alert. Mental status is at baseline.      Gait: Gait normal.   Psychiatric:         Mood and Affect: Mood normal.         Behavior: Behavior normal.         Assessment:       1. Health maintenance examination    2. Essential hypertension    3. Controlled type 2 diabetes mellitus without complication, without long-term current use of insulin    4. [place] cardiac  risk >20% in next 10 years    5. Osteopenia, unspecified location    6. Vitamin D deficiency    7. Vitamin B12 deficiency    8. Newly recognized heart murmur    9. Screening mammogram for breast cancer        Plan:       Essential hypertension  Continue current medications.  RTC in 6 months for follow up.  -     Comprehensive Metabolic Panel; Future  -     TSH; Future  -     CBC Auto Differential; Future  -     Microalbumin/Creatinine Ratio, Urine; Future    Controlled type 2 diabetes mellitus without complication, without long-term current use of insulin  Continue medication, evaluation, and management per endocrinologist.  -     Lipid Panel; Future  -     Hemoglobin A1C; Future  -     CBC Auto Differential; Future    [place] cardiac risk >20% in next 10 years  -     Lipid Panel; Future    Osteopenia, unspecified location  Vitamin D deficiency  Continue vitamin D supplementation.  Recommend weight bearing exercises for bone strengthening.  RTC in 6 months for follow up.  -     Vitamin D; Future    Vitamin B12 deficiency  -     Vitamin B12; Future    Newly recognized heart murmur  Discussed with patient, will continue active monitoring  If begins with symptoms or other concerns, will obtain echo  Follow up with cardiologist as scheduled  RTC in 6 months for follow up    Health maintenance examination  -     Comprehensive Metabolic Panel; Future  -     TSH; Future  -     Lipid Panel; Future  -     Hemoglobin A1C; Future  -     CBC Auto Differential; Future  -     Vitamin D; Future  -     Vitamin B12; Future  -     Microalbumin/Creatinine Ratio, Urine; Future    Screening mammogram for breast cancer  -     Mammo Digital Screening Bilat w/ Calvin; Future      Betsy Warner MD  8/23/2023

## 2023-08-24 LAB
25(OH)D3+25(OH)D2 SERPL-MCNC: 48 NG/ML (ref 30–96)
VIT B12 SERPL-MCNC: 1138 PG/ML (ref 210–950)

## 2023-08-25 ENCOUNTER — PATIENT MESSAGE (OUTPATIENT)
Dept: INTERNAL MEDICINE | Facility: CLINIC | Age: 73
End: 2023-08-25
Payer: MEDICARE

## 2023-08-25 DIAGNOSIS — D53.9 NUTRITIONAL ANEMIA: ICD-10-CM

## 2023-08-25 DIAGNOSIS — D64.9 ANEMIA, UNSPECIFIED TYPE: Primary | ICD-10-CM

## 2023-09-07 ENCOUNTER — TELEPHONE (OUTPATIENT)
Dept: OPHTHALMOLOGY | Facility: CLINIC | Age: 73
End: 2023-09-07
Payer: MEDICARE

## 2023-09-07 ENCOUNTER — PATIENT MESSAGE (OUTPATIENT)
Dept: OPTOMETRY | Facility: CLINIC | Age: 73
End: 2023-09-07
Payer: MEDICARE

## 2023-09-07 NOTE — TELEPHONE ENCOUNTER
Tried calling pt to reschedule her appointment with Dr. Simons but no answer was able to leave a voice message. Dr. Simons will be out the day the pt is scheduled.

## 2023-09-08 ENCOUNTER — LAB VISIT (OUTPATIENT)
Dept: LAB | Facility: OTHER | Age: 73
End: 2023-09-08
Attending: STUDENT IN AN ORGANIZED HEALTH CARE EDUCATION/TRAINING PROGRAM
Payer: MEDICARE

## 2023-09-08 DIAGNOSIS — D64.9 ANEMIA, UNSPECIFIED TYPE: ICD-10-CM

## 2023-09-08 DIAGNOSIS — D53.9 NUTRITIONAL ANEMIA: ICD-10-CM

## 2023-09-08 LAB
BASOPHILS # BLD AUTO: 0.02 K/UL (ref 0–0.2)
BASOPHILS NFR BLD: 0.3 % (ref 0–1.9)
DIFFERENTIAL METHOD: ABNORMAL
EOSINOPHIL # BLD AUTO: 0.1 K/UL (ref 0–0.5)
EOSINOPHIL NFR BLD: 1.4 % (ref 0–8)
ERYTHROCYTE [DISTWIDTH] IN BLOOD BY AUTOMATED COUNT: 15.1 % (ref 11.5–14.5)
FERRITIN SERPL-MCNC: 93 NG/ML (ref 20–300)
FOLATE SERPL-MCNC: 15.3 NG/ML (ref 4–24)
HCT VFR BLD AUTO: 37.6 % (ref 37–48.5)
HGB BLD-MCNC: 12.1 G/DL (ref 12–16)
IMM GRANULOCYTES # BLD AUTO: 0.01 K/UL (ref 0–0.04)
IMM GRANULOCYTES NFR BLD AUTO: 0.2 % (ref 0–0.5)
IRON SERPL-MCNC: 66 UG/DL (ref 30–160)
LYMPHOCYTES # BLD AUTO: 2.2 K/UL (ref 1–4.8)
LYMPHOCYTES NFR BLD: 34.9 % (ref 18–48)
MCH RBC QN AUTO: 29.5 PG (ref 27–31)
MCHC RBC AUTO-ENTMCNC: 32.2 G/DL (ref 32–36)
MCV RBC AUTO: 92 FL (ref 82–98)
MONOCYTES # BLD AUTO: 0.4 K/UL (ref 0.3–1)
MONOCYTES NFR BLD: 6.3 % (ref 4–15)
NEUTROPHILS # BLD AUTO: 3.6 K/UL (ref 1.8–7.7)
NEUTROPHILS NFR BLD: 56.9 % (ref 38–73)
NRBC BLD-RTO: 0 /100 WBC
PATH REV BLD -IMP: NORMAL
PATH REV BLD -IMP: NORMAL
PLATELET # BLD AUTO: 222 K/UL (ref 150–450)
PMV BLD AUTO: 11.4 FL (ref 9.2–12.9)
RBC # BLD AUTO: 4.1 M/UL (ref 4–5.4)
RETICS/RBC NFR AUTO: 1.7 % (ref 0.5–2.5)
SATURATED IRON: 15 % (ref 20–50)
TOTAL IRON BINDING CAPACITY: 438 UG/DL (ref 250–450)
TRANSFERRIN SERPL-MCNC: 296 MG/DL (ref 200–375)
VIT B12 SERPL-MCNC: 1218 PG/ML (ref 210–950)
WBC # BLD AUTO: 6.3 K/UL (ref 3.9–12.7)

## 2023-09-08 PROCEDURE — 83540 ASSAY OF IRON: CPT | Mod: HCNC | Performed by: STUDENT IN AN ORGANIZED HEALTH CARE EDUCATION/TRAINING PROGRAM

## 2023-09-08 PROCEDURE — 82728 ASSAY OF FERRITIN: CPT | Mod: HCNC | Performed by: STUDENT IN AN ORGANIZED HEALTH CARE EDUCATION/TRAINING PROGRAM

## 2023-09-08 PROCEDURE — 85060 PATHOLOGIST REVIEW: ICD-10-PCS | Mod: HCNC,,, | Performed by: PATHOLOGY

## 2023-09-08 PROCEDURE — 85060 BLOOD SMEAR INTERPRETATION: CPT | Mod: HCNC,,, | Performed by: PATHOLOGY

## 2023-09-08 PROCEDURE — 84466 ASSAY OF TRANSFERRIN: CPT | Mod: HCNC | Performed by: STUDENT IN AN ORGANIZED HEALTH CARE EDUCATION/TRAINING PROGRAM

## 2023-09-08 PROCEDURE — 82746 ASSAY OF FOLIC ACID SERUM: CPT | Mod: HCNC | Performed by: STUDENT IN AN ORGANIZED HEALTH CARE EDUCATION/TRAINING PROGRAM

## 2023-09-08 PROCEDURE — 82607 VITAMIN B-12: CPT | Mod: HCNC | Performed by: STUDENT IN AN ORGANIZED HEALTH CARE EDUCATION/TRAINING PROGRAM

## 2023-09-08 PROCEDURE — 36415 COLL VENOUS BLD VENIPUNCTURE: CPT | Mod: HCNC | Performed by: STUDENT IN AN ORGANIZED HEALTH CARE EDUCATION/TRAINING PROGRAM

## 2023-09-08 PROCEDURE — 85025 COMPLETE CBC W/AUTO DIFF WBC: CPT | Mod: HCNC | Performed by: STUDENT IN AN ORGANIZED HEALTH CARE EDUCATION/TRAINING PROGRAM

## 2023-09-08 PROCEDURE — 85045 AUTOMATED RETICULOCYTE COUNT: CPT | Mod: HCNC | Performed by: STUDENT IN AN ORGANIZED HEALTH CARE EDUCATION/TRAINING PROGRAM

## 2023-09-09 DIAGNOSIS — I10 ESSENTIAL HYPERTENSION: ICD-10-CM

## 2023-09-11 RX ORDER — VALSARTAN 160 MG/1
160 TABLET ORAL
Qty: 90 TABLET | Refills: 3 | Status: SHIPPED | OUTPATIENT
Start: 2023-09-11

## 2023-10-05 ENCOUNTER — OFFICE VISIT (OUTPATIENT)
Dept: URGENT CARE | Facility: CLINIC | Age: 73
End: 2023-10-05
Payer: MEDICARE

## 2023-10-05 VITALS
HEIGHT: 63 IN | SYSTOLIC BLOOD PRESSURE: 131 MMHG | WEIGHT: 198 LBS | OXYGEN SATURATION: 98 % | RESPIRATION RATE: 17 BRPM | TEMPERATURE: 98 F | HEART RATE: 60 BPM | BODY MASS INDEX: 35.08 KG/M2 | DIASTOLIC BLOOD PRESSURE: 74 MMHG

## 2023-10-05 DIAGNOSIS — M54.30 SCIATICA, UNSPECIFIED LATERALITY: ICD-10-CM

## 2023-10-05 DIAGNOSIS — S79.919A HIP INJURY, INITIAL ENCOUNTER: Primary | ICD-10-CM

## 2023-10-05 PROCEDURE — 96372 PR INJECTION,THERAP/PROPH/DIAG2ST, IM OR SUBCUT: ICD-10-PCS | Mod: S$GLB,,, | Performed by: FAMILY MEDICINE

## 2023-10-05 PROCEDURE — 99213 PR OFFICE/OUTPT VISIT, EST, LEVL III, 20-29 MIN: ICD-10-PCS | Mod: 25,S$GLB,, | Performed by: FAMILY MEDICINE

## 2023-10-05 PROCEDURE — 99213 OFFICE O/P EST LOW 20 MIN: CPT | Mod: 25,S$GLB,, | Performed by: FAMILY MEDICINE

## 2023-10-05 PROCEDURE — 96372 THER/PROPH/DIAG INJ SC/IM: CPT | Mod: S$GLB,,, | Performed by: FAMILY MEDICINE

## 2023-10-05 RX ORDER — KETOROLAC TROMETHAMINE 30 MG/ML
15 INJECTION, SOLUTION INTRAMUSCULAR; INTRAVENOUS
Status: COMPLETED | OUTPATIENT
Start: 2023-10-05 | End: 2023-10-05

## 2023-10-05 RX ORDER — GABAPENTIN 300 MG/1
300 CAPSULE ORAL 3 TIMES DAILY
Qty: 42 CAPSULE | Refills: 0 | Status: SHIPPED | OUTPATIENT
Start: 2023-10-05 | End: 2024-02-12

## 2023-10-05 RX ORDER — DEXAMETHASONE SODIUM PHOSPHATE 100 MG/10ML
5 INJECTION INTRAMUSCULAR; INTRAVENOUS
Status: COMPLETED | OUTPATIENT
Start: 2023-10-05 | End: 2023-10-05

## 2023-10-05 RX ADMIN — KETOROLAC TROMETHAMINE 15 MG: 30 INJECTION, SOLUTION INTRAMUSCULAR; INTRAVENOUS at 09:10

## 2023-10-05 RX ADMIN — DEXAMETHASONE SODIUM PHOSPHATE 5 MG: 100 INJECTION INTRAMUSCULAR; INTRAVENOUS at 09:10

## 2023-10-05 NOTE — PROGRESS NOTES
"Subjective:      Patient ID: Jennifer Motley is a 72 y.o. female.    Vitals:  height is 5' 3" (1.6 m) and weight is 89.8 kg (198 lb). Her temperature is 97.9 °F (36.6 °C). Her blood pressure is 131/74 and her pulse is 60. Her respiration is 17 and oxygen saturation is 98%.     Chief Complaint: Hip Pain    Patient presents with left hip pain(8) that radiates down her left leg which started on Tuesday after miss step off the curb.Patient has tried heat and muscle rub with some relief.     Hip Pain   The incident occurred 2 days ago. The incident occurred in the street. The injury mechanism is unknown. The pain is present in the left leg and left hip. The quality of the pain is described as aching and shooting. The pain is at a severity of 8/10. The pain is moderate. The pain has been Constant since onset. Pertinent negatives include no inability to bear weight, loss of motion, loss of sensation, muscle weakness, numbness or tingling. She reports no foreign bodies present. The symptoms are aggravated by movement. She has tried heat, elevation and rest for the symptoms. The treatment provided mild relief.       Musculoskeletal:  Positive for pain, abnormal ROM of joint, pain with walking and muscle ache. Negative for trauma.   Neurological:  Negative for numbness.      Objective:     Physical Exam   Constitutional: She is oriented to person, place, and time.   HENT:   Head: Normocephalic.   Ears:   Right Ear: External ear normal.   Left Ear: External ear normal.   Nose: Nose normal.   Mouth/Throat: Mucous membranes are moist.   Eyes: Conjunctivae are normal.   Cardiovascular: Normal rate.   Pulmonary/Chest: Effort normal.   Musculoskeletal:      Comments: No hip joint pain. More posterior glute, ttp that reproduces spasm and electrical pain discharge down her left leg to her knee.    Neurological: She is alert and oriented to person, place, and time.   Skin: Skin is dry.   Psychiatric: Her behavior is normal. "       Assessment:     1. Hip injury, initial encounter    2. Sciatica, unspecified laterality        Plan:       Hip injury, initial encounter  -     dexAMETHasone injection 5 mg  -     ketorolac injection 15 mg    Sciatica, unspecified laterality  -     dexAMETHasone injection 5 mg  -     ketorolac injection 15 mg  -     gabapentin (NEURONTIN) 300 MG capsule; Take 1 capsule (300 mg total) by mouth 3 (three) times daily. for 14 days  Dispense: 42 capsule; Refill: 0    Well controlled diabetes. Will give lower dose of steroid to help. Would not do oral course. Patient understands this will take some time to improve. Rec light stretching.   RTC PRN

## 2023-10-20 NOTE — PROGRESS NOTES
HPI    DLS: 2023    Pt here for 4 Month Check;  Pt states no eye pain or discomfort. Pt states vision seems to be doing   good.    Meds:  Dorzolamide TID OU  Travatan Z QHS OU    1. POAG OU   2. NS OU   3. Type 2 DM no DR             Last edited by Rossy Tijerina on 10/23/2023  9:40 AM.            Assessment /Plan     For exam results, see Encounter Report.    Primary open angle glaucoma (POAG) of both eyes, mild stage    Cataract, nuclear sclerotic, both eyes    Type 2 diabetes mellitus without ophthalmic manifestations        1. Patient followed by Dr. Balwinder Gregg for many years. - post Mary   Pre-Mary - was seeing dr Nadine Avila   Seen by Dr. Kebede in . Seen by Dr. Hensley (retina) for ? Mild armd  in . - taking AREDs but only 1 x day - is to F/U w/ her yearly        Glaucoma (type and duration)    Primary open angle glaucoma OU. Mild.    First HVF    (outside records - fullish)    First photos   10/2021    Treatment / Drops started   About            Family history    Mother  since . H/o glaucoma surgery/drops.         Glaucoma meds    Dorzolamide TID OU. Travatan QHS OU.         H/O adverse rxn to glaucoma drops:   H/o bundle branch block. Taken off of beta blockers.         LASERS    None        GLAUCOMA SURGERIES    None        OTHER EYE SURGERIES    None        CDR    0.75/0.8        Tbase    ?? Off gtts           Tmax    - on gtts           Ttarget    18 ou - may need to be lower              HVF   - many old outside test - pt brought in - all near normal - Dr Balwinder Gregg     2 test  to   - SNS  od // ?SAD / INS  os        Gonio    +3 ou         CCT    505/ 507         OCT    2 test  to  - RNFL - dec G/TI, bord NS/NI od // bord TI  os        Disc photos    10/18/21 with Dr. Simons    - Ttoday      (target 18 ou)   - Test done today    IOP -    2, NS ou - mild monitor     3. Diabetes    No BDR     Plan:   Continue dorzolamide TID OU and  Travatan QHS OU.  Avoid BB's - heart block   Monitor HVF's / and IOP and OCT going forward - to monitor for progression   + SNS od // ? INS os     Would be difficult to do slt - pt squeezes a lot with gonio     Pt records were given back to her - they have not been scanned into Epic - (10/18/2021)    But the important data has been - reviewed and abstracted     F/U 4 months  / HVF / DFE / OCT - consider a trial of brimonidine / simbrinza / or rocklatan prn

## 2023-10-23 ENCOUNTER — OFFICE VISIT (OUTPATIENT)
Dept: OPHTHALMOLOGY | Facility: CLINIC | Age: 73
End: 2023-10-23
Payer: MEDICARE

## 2023-10-23 DIAGNOSIS — H40.1131 PRIMARY OPEN ANGLE GLAUCOMA (POAG) OF BOTH EYES, MILD STAGE: Primary | ICD-10-CM

## 2023-10-23 DIAGNOSIS — H25.13 CATARACT, NUCLEAR SCLEROTIC, BOTH EYES: ICD-10-CM

## 2023-10-23 DIAGNOSIS — E11.9 TYPE 2 DIABETES MELLITUS WITHOUT OPHTHALMIC MANIFESTATIONS: ICD-10-CM

## 2023-10-23 PROCEDURE — 3044F PR MOST RECENT HEMOGLOBIN A1C LEVEL <7.0%: ICD-10-PCS | Mod: HCNC,CPTII,S$GLB, | Performed by: OPHTHALMOLOGY

## 2023-10-23 PROCEDURE — 1101F PR PT FALLS ASSESS DOC 0-1 FALLS W/OUT INJ PAST YR: ICD-10-PCS | Mod: HCNC,CPTII,S$GLB, | Performed by: OPHTHALMOLOGY

## 2023-10-23 PROCEDURE — 1160F PR REVIEW ALL MEDS BY PRESCRIBER/CLIN PHARMACIST DOCUMENTED: ICD-10-PCS | Mod: HCNC,CPTII,S$GLB, | Performed by: OPHTHALMOLOGY

## 2023-10-23 PROCEDURE — 3061F PR NEG MICROALBUMINURIA RESULT DOCUMENTED/REVIEW: ICD-10-PCS | Mod: HCNC,CPTII,S$GLB, | Performed by: OPHTHALMOLOGY

## 2023-10-23 PROCEDURE — 1126F AMNT PAIN NOTED NONE PRSNT: CPT | Mod: HCNC,CPTII,S$GLB, | Performed by: OPHTHALMOLOGY

## 2023-10-23 PROCEDURE — 1159F MED LIST DOCD IN RCRD: CPT | Mod: HCNC,CPTII,S$GLB, | Performed by: OPHTHALMOLOGY

## 2023-10-23 PROCEDURE — 99214 PR OFFICE/OUTPT VISIT, EST, LEVL IV, 30-39 MIN: ICD-10-PCS | Mod: HCNC,S$GLB,, | Performed by: OPHTHALMOLOGY

## 2023-10-23 PROCEDURE — 3066F PR DOCUMENTATION OF TREATMENT FOR NEPHROPATHY: ICD-10-PCS | Mod: HCNC,CPTII,S$GLB, | Performed by: OPHTHALMOLOGY

## 2023-10-23 PROCEDURE — 99214 OFFICE O/P EST MOD 30 MIN: CPT | Mod: HCNC,S$GLB,, | Performed by: OPHTHALMOLOGY

## 2023-10-23 PROCEDURE — 3044F HG A1C LEVEL LT 7.0%: CPT | Mod: HCNC,CPTII,S$GLB, | Performed by: OPHTHALMOLOGY

## 2023-10-23 PROCEDURE — 4010F PR ACE/ARB THEARPY RXD/TAKEN: ICD-10-PCS | Mod: HCNC,CPTII,S$GLB, | Performed by: OPHTHALMOLOGY

## 2023-10-23 PROCEDURE — 1101F PT FALLS ASSESS-DOCD LE1/YR: CPT | Mod: HCNC,CPTII,S$GLB, | Performed by: OPHTHALMOLOGY

## 2023-10-23 PROCEDURE — 4010F ACE/ARB THERAPY RXD/TAKEN: CPT | Mod: HCNC,CPTII,S$GLB, | Performed by: OPHTHALMOLOGY

## 2023-10-23 PROCEDURE — 99999 PR PBB SHADOW E&M-EST. PATIENT-LVL III: ICD-10-PCS | Mod: PBBFAC,HCNC,, | Performed by: OPHTHALMOLOGY

## 2023-10-23 PROCEDURE — 1160F RVW MEDS BY RX/DR IN RCRD: CPT | Mod: HCNC,CPTII,S$GLB, | Performed by: OPHTHALMOLOGY

## 2023-10-23 PROCEDURE — 99999 PR PBB SHADOW E&M-EST. PATIENT-LVL III: CPT | Mod: PBBFAC,HCNC,, | Performed by: OPHTHALMOLOGY

## 2023-10-23 PROCEDURE — 1159F PR MEDICATION LIST DOCUMENTED IN MEDICAL RECORD: ICD-10-PCS | Mod: HCNC,CPTII,S$GLB, | Performed by: OPHTHALMOLOGY

## 2023-10-23 PROCEDURE — 3066F NEPHROPATHY DOC TX: CPT | Mod: HCNC,CPTII,S$GLB, | Performed by: OPHTHALMOLOGY

## 2023-10-23 PROCEDURE — 3061F NEG MICROALBUMINURIA REV: CPT | Mod: HCNC,CPTII,S$GLB, | Performed by: OPHTHALMOLOGY

## 2023-10-23 PROCEDURE — 1126F PR PAIN SEVERITY QUANTIFIED, NO PAIN PRESENT: ICD-10-PCS | Mod: HCNC,CPTII,S$GLB, | Performed by: OPHTHALMOLOGY

## 2023-10-23 PROCEDURE — 3288F PR FALLS RISK ASSESSMENT DOCUMENTED: ICD-10-PCS | Mod: HCNC,CPTII,S$GLB, | Performed by: OPHTHALMOLOGY

## 2023-10-23 PROCEDURE — 3288F FALL RISK ASSESSMENT DOCD: CPT | Mod: HCNC,CPTII,S$GLB, | Performed by: OPHTHALMOLOGY

## 2023-12-14 ENCOUNTER — OFFICE VISIT (OUTPATIENT)
Dept: PODIATRY | Facility: CLINIC | Age: 73
End: 2023-12-14
Payer: MEDICARE

## 2023-12-14 VITALS
SYSTOLIC BLOOD PRESSURE: 133 MMHG | HEIGHT: 63 IN | HEART RATE: 68 BPM | DIASTOLIC BLOOD PRESSURE: 56 MMHG | BODY MASS INDEX: 35.08 KG/M2 | WEIGHT: 198 LBS

## 2023-12-14 DIAGNOSIS — M20.11 HALLUX VALGUS OF RIGHT FOOT: ICD-10-CM

## 2023-12-14 DIAGNOSIS — M20.41 HAMMER TOES OF BOTH FEET: ICD-10-CM

## 2023-12-14 DIAGNOSIS — M20.42 HAMMER TOES OF BOTH FEET: ICD-10-CM

## 2023-12-14 DIAGNOSIS — E11.9 CONTROLLED TYPE 2 DIABETES MELLITUS WITHOUT COMPLICATION, WITHOUT LONG-TERM CURRENT USE OF INSULIN: Primary | ICD-10-CM

## 2023-12-14 PROCEDURE — 3075F PR MOST RECENT SYSTOLIC BLOOD PRESS GE 130-139MM HG: ICD-10-PCS | Mod: HCNC,CPTII,S$GLB, | Performed by: PODIATRIST

## 2023-12-14 PROCEDURE — 4010F PR ACE/ARB THEARPY RXD/TAKEN: ICD-10-PCS | Mod: HCNC,CPTII,S$GLB, | Performed by: PODIATRIST

## 2023-12-14 PROCEDURE — 1101F PR PT FALLS ASSESS DOC 0-1 FALLS W/OUT INJ PAST YR: ICD-10-PCS | Mod: HCNC,CPTII,S$GLB, | Performed by: PODIATRIST

## 2023-12-14 PROCEDURE — 3008F BODY MASS INDEX DOCD: CPT | Mod: HCNC,CPTII,S$GLB, | Performed by: PODIATRIST

## 2023-12-14 PROCEDURE — 3061F PR NEG MICROALBUMINURIA RESULT DOCUMENTED/REVIEW: ICD-10-PCS | Mod: HCNC,CPTII,S$GLB, | Performed by: PODIATRIST

## 2023-12-14 PROCEDURE — 3044F PR MOST RECENT HEMOGLOBIN A1C LEVEL <7.0%: ICD-10-PCS | Mod: HCNC,CPTII,S$GLB, | Performed by: PODIATRIST

## 2023-12-14 PROCEDURE — 3044F HG A1C LEVEL LT 7.0%: CPT | Mod: HCNC,CPTII,S$GLB, | Performed by: PODIATRIST

## 2023-12-14 PROCEDURE — 3288F PR FALLS RISK ASSESSMENT DOCUMENTED: ICD-10-PCS | Mod: HCNC,CPTII,S$GLB, | Performed by: PODIATRIST

## 2023-12-14 PROCEDURE — 99999 PR PBB SHADOW E&M-EST. PATIENT-LVL III: ICD-10-PCS | Mod: PBBFAC,HCNC,, | Performed by: PODIATRIST

## 2023-12-14 PROCEDURE — 3061F NEG MICROALBUMINURIA REV: CPT | Mod: HCNC,CPTII,S$GLB, | Performed by: PODIATRIST

## 2023-12-14 PROCEDURE — 3288F FALL RISK ASSESSMENT DOCD: CPT | Mod: HCNC,CPTII,S$GLB, | Performed by: PODIATRIST

## 2023-12-14 PROCEDURE — 3066F PR DOCUMENTATION OF TREATMENT FOR NEPHROPATHY: ICD-10-PCS | Mod: HCNC,CPTII,S$GLB, | Performed by: PODIATRIST

## 2023-12-14 PROCEDURE — 1101F PT FALLS ASSESS-DOCD LE1/YR: CPT | Mod: HCNC,CPTII,S$GLB, | Performed by: PODIATRIST

## 2023-12-14 PROCEDURE — 99213 OFFICE O/P EST LOW 20 MIN: CPT | Mod: HCNC,S$GLB,, | Performed by: PODIATRIST

## 2023-12-14 PROCEDURE — 4010F ACE/ARB THERAPY RXD/TAKEN: CPT | Mod: HCNC,CPTII,S$GLB, | Performed by: PODIATRIST

## 2023-12-14 PROCEDURE — 3008F PR BODY MASS INDEX (BMI) DOCUMENTED: ICD-10-PCS | Mod: HCNC,CPTII,S$GLB, | Performed by: PODIATRIST

## 2023-12-14 PROCEDURE — 3075F SYST BP GE 130 - 139MM HG: CPT | Mod: HCNC,CPTII,S$GLB, | Performed by: PODIATRIST

## 2023-12-14 PROCEDURE — 1126F PR PAIN SEVERITY QUANTIFIED, NO PAIN PRESENT: ICD-10-PCS | Mod: HCNC,CPTII,S$GLB, | Performed by: PODIATRIST

## 2023-12-14 PROCEDURE — 3066F NEPHROPATHY DOC TX: CPT | Mod: HCNC,CPTII,S$GLB, | Performed by: PODIATRIST

## 2023-12-14 PROCEDURE — 3078F PR MOST RECENT DIASTOLIC BLOOD PRESSURE < 80 MM HG: ICD-10-PCS | Mod: HCNC,CPTII,S$GLB, | Performed by: PODIATRIST

## 2023-12-14 PROCEDURE — 1126F AMNT PAIN NOTED NONE PRSNT: CPT | Mod: HCNC,CPTII,S$GLB, | Performed by: PODIATRIST

## 2023-12-14 PROCEDURE — 3078F DIAST BP <80 MM HG: CPT | Mod: HCNC,CPTII,S$GLB, | Performed by: PODIATRIST

## 2023-12-14 PROCEDURE — 99999 PR PBB SHADOW E&M-EST. PATIENT-LVL III: CPT | Mod: PBBFAC,HCNC,, | Performed by: PODIATRIST

## 2023-12-14 PROCEDURE — 99213 PR OFFICE/OUTPT VISIT, EST, LEVL III, 20-29 MIN: ICD-10-PCS | Mod: HCNC,S$GLB,, | Performed by: PODIATRIST

## 2023-12-14 NOTE — PROGRESS NOTES
Subjective:      Patient ID: Jennifer Motley is a 73 y.o. female.    Chief Complaint: Diabetic Foot Exam (PCP Betsy Warner MD 8/23/2023)    Jennifer is a 73 y.o. female who presents to the clinic upon referral from Dr. Ruba reynoso. provider found  for evaluation and treatment of diabetic feet. Jennifer has a past medical history of Cataract, Diabetes mellitus type II, controlled, Glaucoma, Hypertension, Idiopathic ventricular tachycardia, Obese, Ocular hypertension, Overactive bladder, and Thyroid cyst. Patient relates no major problem with feet. Only complaints today consist of Diabetes, increased risk amputation needing evaluation/management/optomization of foot care. .    PCP: Betsy Warner MD    Date Last Seen by PCP:   Chief Complaint   Patient presents with    Diabetic Foot Exam     PCP Betsy Warner MD 8/23/2023         Current shoe gear: Casual shoes    Hemoglobin A1C   Date Value Ref Range Status   08/23/2023 6.1 (H) 4.0 - 5.6 % Final     Comment:     ADA Screening Guidelines:  5.7-6.4%  Consistent with prediabetes  >or=6.5%  Consistent with diabetes    High levels of fetal hemoglobin interfere with the HbA1C  assay. Heterozygous hemoglobin variants (HbS, HgC, etc)do  not significantly interfere with this assay.   However, presence of multiple variants may affect accuracy.     05/25/2023 6.0 (H) 4.0 - 5.6 % Final     Comment:     ADA Screening Guidelines:  5.7-6.4%  Consistent with prediabetes  >or=6.5%  Consistent with diabetes    High levels of fetal hemoglobin interfere with the HbA1C  assay. Heterozygous hemoglobin variants (HbS, HgC, etc)do  not significantly interfere with this assay.   However, presence of multiple variants may affect accuracy.     11/15/2022 6.3 (H) 4.0 - 5.6 % Final     Comment:     ADA Screening Guidelines:  5.7-6.4%  Consistent with prediabetes  >or=6.5%  Consistent with diabetes    High levels of fetal hemoglobin interfere with the HbA1C  assay. Heterozygous hemoglobin variants  (HbS, HgC, etc)do  not significantly interfere with this assay.   However, presence of multiple variants may affect accuracy.             Review of Systems   Constitutional: Negative for chills, diaphoresis, fever, malaise/fatigue and night sweats.   Cardiovascular:  Negative for claudication, cyanosis, leg swelling and syncope.   Skin:  Negative for color change, dry skin, nail changes, rash, suspicious lesions and unusual hair distribution.   Musculoskeletal:  Negative for falls, joint pain, joint swelling, muscle cramps, muscle weakness and stiffness.   Gastrointestinal:  Negative for constipation, diarrhea, nausea and vomiting.   Neurological:  Positive for sensory change. Negative for brief paralysis, disturbances in coordination, focal weakness, numbness, paresthesias and tremors.           Objective:      Physical Exam  Constitutional:       General: She is not in acute distress.     Appearance: She is well-developed. She is not diaphoretic.   Cardiovascular:      Pulses:           Popliteal pulses are 2+ on the right side and 2+ on the left side.        Dorsalis pedis pulses are 2+ on the right side and 2+ on the left side.        Posterior tibial pulses are 2+ on the right side and 2+ on the left side.      Comments: Capillary refill 3 seconds all toes/distal feet, all toes/both feet warm to touch.      Negative lymphadenopathy bilateral popliteal fossa and tarsal tunnel.      Negavie lower extremity edema bilateral.    Musculoskeletal:      Right ankle: No swelling, deformity, ecchymosis or lacerations. Normal range of motion. Normal pulse.      Right Achilles Tendon: Normal. No defects. Yepez's test negative.      Comments: Bunion/hallux valgus right without symptom.    Patient has hammertoes of digits    5 bilateral               partially reducible without symptom today.    Otherwise, Normal angle, base, station of gait. All ten toes without clubbing, cyanosis, or signs of ischemia.  No pain to  palpation bilateral lower extremities.  Range of motion, stability, muscle strength, and muscle tone normal bilateral feet and legs.    Lymphadenopathy:      Lower Body: No right inguinal adenopathy. No left inguinal adenopathy.      Comments: Negative lymphadenopathy bilateral popliteal fossa and tarsal tunnel.    Negative lymphangitic streaking bilateral feet/ankles/legs.   Skin:     General: Skin is warm and dry.      Capillary Refill: Capillary refill takes 2 to 3 seconds.      Coloration: Skin is not pale.      Findings: No abrasion, bruising, burn, ecchymosis, erythema, laceration, lesion or rash.      Nails: There is no clubbing.      Comments:   Skin is normal age and health appropriate color, turgor, texture, and temperature bilateral lower extremities without ulceration, hyperpigmentation, discoloration, masses nodules or cords palpated.  No ecchymosis, erythema, edema, or cardinal signs of infection bilateral lower extremities.       Neurological:      Mental Status: She is alert and oriented to person, place, and time.      Sensory: Sensory deficit present.      Motor: No tremor, atrophy or abnormal muscle tone.      Gait: Gait normal.      Deep Tendon Reflexes:      Reflex Scores:       Patellar reflexes are 2+ on the right side and 2+ on the left side.       Achilles reflexes are 2+ on the right side and 2+ on the left side.     Comments: Negative tinel sign to percussion sural, superficial peroneal, deep peroneal, saphenous, and posterior tibial nerves right and left ankles and feet.    Decreased/absent vibratory sensation bilateral feet to 128Hz tuning fork.     Psychiatric:         Behavior: Behavior is cooperative.               Assessment:       Encounter Diagnoses   Name Primary?    Controlled type 2 diabetes mellitus without complication, without long-term current use of insulin Yes    Hammer toes of both feet     Hallux valgus of right foot          Plan:       Jennifer was seen today for diabetic  foot exam.    Diagnoses and all orders for this visit:    Controlled type 2 diabetes mellitus without complication, without long-term current use of insulin    Hammer toes of both feet    Hallux valgus of right foot      I counseled the patient on her conditions, their implications and medical management.        - Shoe inspection. Diabetic Foot Education. Patient reminded of the importance of good nutrition and blood sugar control to help prevent podiatric complications of diabetes. Patient instructed on proper foot hygeine. We discussed wearing proper shoe gear, daily foot inspections, never walking without protective shoe gear, never putting sharp instruments to feet, routine podiatric this at least annual.      Discussed conservative treatment with shoes of adequate dimensions, material, and style to alleviate symptoms and delay or prevent surgical intervention.                Follow up in about 1 year (around 12/14/2024).

## 2023-12-20 ENCOUNTER — HOSPITAL ENCOUNTER (OUTPATIENT)
Dept: RADIOLOGY | Facility: OTHER | Age: 73
Discharge: HOME | End: 2023-12-20
Attending: STUDENT IN AN ORGANIZED HEALTH CARE EDUCATION/TRAINING PROGRAM
Payer: MEDICARE

## 2023-12-20 DIAGNOSIS — Z12.31 SCREENING MAMMOGRAM FOR BREAST CANCER: ICD-10-CM

## 2023-12-20 PROCEDURE — 77067 SCR MAMMO BI INCL CAD: CPT | Mod: TC,HCNC

## 2023-12-20 PROCEDURE — 77063 BREAST TOMOSYNTHESIS BI: CPT | Mod: 26,HCNC,, | Performed by: RADIOLOGY

## 2023-12-20 PROCEDURE — 77067 MAMMO DIGITAL SCREENING BILAT WITH TOMO: ICD-10-PCS | Mod: 26,HCNC,, | Performed by: RADIOLOGY

## 2023-12-20 PROCEDURE — 77063 MAMMO DIGITAL SCREENING BILAT WITH TOMO: ICD-10-PCS | Mod: 26,HCNC,, | Performed by: RADIOLOGY

## 2023-12-20 PROCEDURE — 77067 SCR MAMMO BI INCL CAD: CPT | Mod: 26,HCNC,, | Performed by: RADIOLOGY

## 2024-01-10 DIAGNOSIS — H40.1131 PRIMARY OPEN ANGLE GLAUCOMA (POAG) OF BOTH EYES, MILD STAGE: ICD-10-CM

## 2024-01-10 DIAGNOSIS — I10 ESSENTIAL HYPERTENSION: ICD-10-CM

## 2024-01-11 RX ORDER — VERAPAMIL HYDROCHLORIDE 180 MG/1
180 TABLET, EXTENDED RELEASE ORAL DAILY
Qty: 90 TABLET | Refills: 0 | Status: SHIPPED | OUTPATIENT
Start: 2024-01-11

## 2024-01-11 RX ORDER — DORZOLAMIDE HCL 20 MG/ML
1 SOLUTION/ DROPS OPHTHALMIC 3 TIMES DAILY
Qty: 30 ML | Refills: 3 | Status: SHIPPED | OUTPATIENT
Start: 2024-01-11 | End: 2024-02-12

## 2024-01-11 RX ORDER — HYDROCHLOROTHIAZIDE 25 MG/1
25 TABLET ORAL DAILY
Qty: 90 TABLET | Refills: 0 | Status: SHIPPED | OUTPATIENT
Start: 2024-01-11

## 2024-02-05 ENCOUNTER — PATIENT OUTREACH (OUTPATIENT)
Dept: ADMINISTRATIVE | Facility: HOSPITAL | Age: 74
End: 2024-02-05
Payer: MEDICARE

## 2024-02-05 NOTE — PROGRESS NOTES
Population Health Chart Review & Patient Outreach Details    Outreach Performed: NO did not contact     Additional [facility] Notes:    Updated appt notes for 2024 to have pt evaluated for statin.        Updates Requested / Reviewed:      Updated Care Coordination Note, Care Everywhere, Care Team Updated, Removed  or Duplicate Orders, and Immunizations Reconciliation Completed or Queried: [place]         Health Maintenance Topics Overdue:    Health Maintenance Due   Topic Date Due    Shingles Vaccine (1 of 2) Never done    RSV Vaccine (Age 60+ and Pregnant patients) (1 - 1-dose 60+ series) Never done    Eye Exam  2024         Health Maintenance Topic(s) Outreach Outcomes & Actions Taken:    Medication Adherence / Statins - Outreach Outcomes & Actions Taken  : Sent Provider Message to Review to Evaluate Pt for Statin, Add Exclusion Dx Codes, Document Exclusion in Problem List, Change Statin Intensity Level to Moderate or High Intensity if Applicable

## 2024-02-09 NOTE — PROGRESS NOTES
HPI     Glaucoma            Comments: HVF and OCT review today and pt states no changes since last   exam           Comments    DLS: 10/23/23    1. POAG OU  2. NS OU  3. Type 2 DM no DR    MEDS:  Dorzolamide TID OU  Travatan Z QHS OU          Last edited by Chhaya Chester MA on 2024 10:59 AM.            Assessment /Plan     For exam results, see Encounter Report.    Primary open angle glaucoma (POAG) of both eyes, mild stage  -     brinzolamide-brimonidine (SIMBRINZA) 1-0.2 % DrpS; Place 1 drop into both eyes 3 (three) times daily.  Dispense: 7.5 mL; Refill: 3    Cataract, nuclear sclerotic, both eyes    Type 2 diabetes mellitus without ophthalmic manifestations        1. Patient followed by Dr. Balwinder Gregg for many years. - post Mary   Pre-Mary - was seeing dr Nadine Avila   Seen by Dr. Kebede in . Seen by Dr. Hensley (retina) for ? Mild armd  in . - taking AREDs but only 1 x day - is to F/U w/ her yearly        Glaucoma (type and duration)    Primary open angle glaucoma OU. Mild.    First HVF    (outside records - fullish)    First photos   10/2021    Treatment / Drops started   About            Family history    Mother  since . H/o glaucoma surgery/drops.         Glaucoma meds    Dorzolamide TID OU. Travatan QHS OU.         H/O adverse rxn to glaucoma drops:   H/o bundle branch block. Taken off of beta blockers.         LASERS    None        GLAUCOMA SURGERIES    None        OTHER EYE SURGERIES    None        CDR    0.75/0.8        Tbase    ?? Off gtts           Tmax    - on gtts           Ttarget    18 ou - may need to be lower              HVF   - many old outside test - pt brought in - all near normal - Dr Balwinder Gregg     3 test  to   - SNS  (?new IAD)  od // ?SAD / INS  os        Gonio    +3 ou         CCT    505/ 507         OCT    3 test  to  - RNFL - dec G/TI, bord NS/NI od (?prog od) // bord TI  os (stable os)         Disc photos    10/18/21  with Dr. Simons    - Ttoday    20/20  (target 18 ou)   - Test done today    IOP -/ HVF / DFE / OCT     2, NS ou - mild monitor     3. Diabetes    No BDR     Plan:   Continue dorzolamide TID OU and Travatan QHS OU.  Avoid BB's - heart block   Monitor HVF's / and IOP and OCT going forward - to monitor for progression   + SNS od // ? INS os     Would be difficult to do slt - pt squeezes a lot with gonio     Pt records were given back to her - they have not been scanned into Epic - (10/18/2021)    But the important data has been - reviewed and abstracted     2/12/2024   Above target ou   ? OCT and VF prog od // ? VF porog os   Rec trial of brimonidine / simbrinza (sample given) - Rx sent to [facility]   Cont latanoprost ou (consider rocklatan)   Consider SLT - difficult gonio - squeezes     Sees outside retina doctor - Brenda Azar MD- jalen of macula os - monitor    F/U 2 months  / IOP check with ? Simbrinza   If simbrinza not covered or too expensive - consider a trial of  rocklatan prn

## 2024-02-12 ENCOUNTER — CLINICAL SUPPORT (OUTPATIENT)
Dept: OPHTHALMOLOGY | Facility: CLINIC | Age: 74
End: 2024-02-12
Payer: MEDICARE

## 2024-02-12 ENCOUNTER — OFFICE VISIT (OUTPATIENT)
Dept: OPHTHALMOLOGY | Facility: CLINIC | Age: 74
End: 2024-02-12
Payer: MEDICARE

## 2024-02-12 DIAGNOSIS — H40.1131 PRIMARY OPEN ANGLE GLAUCOMA (POAG) OF BOTH EYES, MILD STAGE: Primary | ICD-10-CM

## 2024-02-12 DIAGNOSIS — E11.9 TYPE 2 DIABETES MELLITUS WITHOUT OPHTHALMIC MANIFESTATIONS: ICD-10-CM

## 2024-02-12 DIAGNOSIS — H25.13 CATARACT, NUCLEAR SCLEROTIC, BOTH EYES: ICD-10-CM

## 2024-02-12 DIAGNOSIS — H40.1131 PRIMARY OPEN ANGLE GLAUCOMA (POAG) OF BOTH EYES, MILD STAGE: ICD-10-CM

## 2024-02-12 PROCEDURE — 99214 OFFICE O/P EST MOD 30 MIN: CPT | Mod: HCNC,S$GLB,, | Performed by: OPHTHALMOLOGY

## 2024-02-12 PROCEDURE — 1126F AMNT PAIN NOTED NONE PRSNT: CPT | Mod: HCNC,CPTII,S$GLB, | Performed by: OPHTHALMOLOGY

## 2024-02-12 PROCEDURE — 2023F DILAT RTA XM W/O RTNOPTHY: CPT | Mod: HCNC,CPTII,S$GLB, | Performed by: OPHTHALMOLOGY

## 2024-02-12 PROCEDURE — 1160F RVW MEDS BY RX/DR IN RCRD: CPT | Mod: HCNC,CPTII,S$GLB, | Performed by: OPHTHALMOLOGY

## 2024-02-12 PROCEDURE — 3288F FALL RISK ASSESSMENT DOCD: CPT | Mod: HCNC,CPTII,S$GLB, | Performed by: OPHTHALMOLOGY

## 2024-02-12 PROCEDURE — 1101F PT FALLS ASSESS-DOCD LE1/YR: CPT | Mod: HCNC,CPTII,S$GLB, | Performed by: OPHTHALMOLOGY

## 2024-02-12 PROCEDURE — 1159F MED LIST DOCD IN RCRD: CPT | Mod: HCNC,CPTII,S$GLB, | Performed by: OPHTHALMOLOGY

## 2024-02-12 PROCEDURE — 99999 PR PBB SHADOW E&M-EST. PATIENT-LVL III: CPT | Mod: PBBFAC,HCNC,, | Performed by: OPHTHALMOLOGY

## 2024-02-12 RX ORDER — BRINZOLAMIDE/BRIMONIDINE TARTRATE 10; 2 MG/ML; MG/ML
1 SUSPENSION/ DROPS OPHTHALMIC 3 TIMES DAILY
Qty: 7.5 ML | Refills: 3 | Status: SHIPPED | OUTPATIENT
Start: 2024-02-12 | End: 2024-03-06 | Stop reason: SDUPTHER

## 2024-02-20 ENCOUNTER — OFFICE VISIT (OUTPATIENT)
Dept: INTERNAL MEDICINE | Facility: CLINIC | Age: 74
End: 2024-02-20
Payer: MEDICARE

## 2024-02-20 VITALS
BODY MASS INDEX: 37.15 KG/M2 | DIASTOLIC BLOOD PRESSURE: 62 MMHG | SYSTOLIC BLOOD PRESSURE: 110 MMHG | HEIGHT: 63 IN | HEART RATE: 73 BPM | WEIGHT: 209.69 LBS | OXYGEN SATURATION: 98 %

## 2024-02-20 DIAGNOSIS — I10 ESSENTIAL HYPERTENSION: ICD-10-CM

## 2024-02-20 DIAGNOSIS — E11.42 TYPE 2 DIABETES MELLITUS WITH DIABETIC POLYNEUROPATHY, UNSPECIFIED WHETHER LONG TERM INSULIN USE: ICD-10-CM

## 2024-02-20 DIAGNOSIS — H40.059 OCULAR HYPERTENSION, UNSPECIFIED LATERALITY: ICD-10-CM

## 2024-02-20 DIAGNOSIS — Z91.89 FRAMINGHAM CARDIAC RISK >20% IN NEXT 10 YEARS: ICD-10-CM

## 2024-02-20 DIAGNOSIS — E66.9 OBESITY (BMI 30.0-34.9): ICD-10-CM

## 2024-02-20 DIAGNOSIS — I47.20 VENTRICULAR TACHYCARDIA: ICD-10-CM

## 2024-02-20 DIAGNOSIS — N32.81 OVERACTIVE BLADDER: ICD-10-CM

## 2024-02-20 DIAGNOSIS — I47.29 IDIOPATHIC VENTRICULAR TACHYCARDIA: ICD-10-CM

## 2024-02-20 DIAGNOSIS — M85.80 OSTEOPENIA, UNSPECIFIED LOCATION: ICD-10-CM

## 2024-02-20 DIAGNOSIS — E55.9 VITAMIN D DEFICIENCY: ICD-10-CM

## 2024-02-20 DIAGNOSIS — R01.1 NEWLY RECOGNIZED HEART MURMUR: ICD-10-CM

## 2024-02-20 DIAGNOSIS — E11.9 CONTROLLED TYPE 2 DIABETES MELLITUS WITHOUT COMPLICATION, WITHOUT LONG-TERM CURRENT USE OF INSULIN: ICD-10-CM

## 2024-02-20 DIAGNOSIS — Z00.00 ENCOUNTER FOR PREVENTIVE HEALTH EXAMINATION: Primary | ICD-10-CM

## 2024-02-20 DIAGNOSIS — E04.1 THYROID CYST: ICD-10-CM

## 2024-02-20 DIAGNOSIS — Z80.41 FAMILY HISTORY OF OVARIAN CANCER: ICD-10-CM

## 2024-02-20 DIAGNOSIS — E66.01 SEVERE OBESITY (BMI 35.0-39.9) WITH COMORBIDITY: ICD-10-CM

## 2024-02-20 PROCEDURE — 1159F MED LIST DOCD IN RCRD: CPT | Mod: HCNC,CPTII,S$GLB, | Performed by: NURSE PRACTITIONER

## 2024-02-20 PROCEDURE — 1170F FXNL STATUS ASSESSED: CPT | Mod: HCNC,CPTII,S$GLB, | Performed by: NURSE PRACTITIONER

## 2024-02-20 PROCEDURE — 1126F AMNT PAIN NOTED NONE PRSNT: CPT | Mod: HCNC,CPTII,S$GLB, | Performed by: NURSE PRACTITIONER

## 2024-02-20 PROCEDURE — 1160F RVW MEDS BY RX/DR IN RCRD: CPT | Mod: HCNC,CPTII,S$GLB, | Performed by: NURSE PRACTITIONER

## 2024-02-20 PROCEDURE — G0439 PPPS, SUBSEQ VISIT: HCPCS | Mod: HCNC,S$GLB,, | Performed by: NURSE PRACTITIONER

## 2024-02-20 PROCEDURE — 3074F SYST BP LT 130 MM HG: CPT | Mod: HCNC,CPTII,S$GLB, | Performed by: NURSE PRACTITIONER

## 2024-02-20 PROCEDURE — 3078F DIAST BP <80 MM HG: CPT | Mod: HCNC,CPTII,S$GLB, | Performed by: NURSE PRACTITIONER

## 2024-02-20 PROCEDURE — 99999 PR PBB SHADOW E&M-EST. PATIENT-LVL IV: CPT | Mod: PBBFAC,HCNC,, | Performed by: NURSE PRACTITIONER

## 2024-02-20 NOTE — PROGRESS NOTES
"  Jennifer Motley presented for a  Medicare AWV and comprehensive Health Risk Assessment today. The following components were reviewed and updated:    Medical history  Family History  Social history  Allergies and Current Medications  Health Risk Assessment  Health Maintenance  Care Team         ** See Completed Assessments for Annual Wellness Visit within the encounter summary.**         The following assessments were completed:  Living Situation  CAGE  Depression Screening  Timed Get Up and Go  Whisper Test  Cognitive Function Screening  Nutrition Screening  ADL Screening  PAQ Screening        Vitals:    02/20/24 1011   BP: 110/62   BP Location: Right arm   Patient Position: Sitting   Pulse: 73   SpO2: 98%   Weight: 95.1 kg (209 lb 10.5 oz)   Height: 5' 3" (1.6 m)     Body mass index is 37.14 kg/m².    Physical Exam  Vitals reviewed.   Constitutional:       Appearance: She is well-developed. She is obese.   HENT:      Head: Normocephalic and atraumatic. Not macrocephalic and not microcephalic. No raccoon eyes, Carcamo's sign, abrasion, contusion, right periorbital erythema, left periorbital erythema or laceration. Hair is normal.      Right Ear: No decreased hearing noted. No laceration, drainage, swelling or tenderness. No middle ear effusion. No foreign body. No mastoid tenderness. No hemotympanum. Tympanic membrane is not injected, scarred, perforated, erythematous, retracted or bulging. Tympanic membrane has normal mobility.      Left Ear: No decreased hearing noted. No laceration, drainage, swelling or tenderness.  No middle ear effusion. No foreign body. No mastoid tenderness. No hemotympanum. Tympanic membrane is not injected, scarred, perforated, erythematous, retracted or bulging. Tympanic membrane has normal mobility.      Nose: Nose normal. No nasal deformity, laceration or mucosal edema.      Mouth/Throat:      Pharynx: Uvula midline.   Eyes:      General: Lids are normal.      Conjunctiva/sclera: " Conjunctivae normal.   Neck:      Thyroid: No thyroid mass or thyromegaly.      Trachea: Trachea normal.   Cardiovascular:      Rate and Rhythm: Normal rate and regular rhythm.   Pulmonary:      Effort: Pulmonary effort is normal.      Breath sounds: Normal breath sounds.   Abdominal:      Palpations: Abdomen is soft.   Musculoskeletal:         General: Normal range of motion.      Cervical back: Neck supple. No edema or erythema. No spinous process tenderness or muscular tenderness. Normal range of motion.   Lymphadenopathy:      Head:      Right side of head: No submental, submandibular, tonsillar, preauricular or posterior auricular adenopathy.      Left side of head: No submental, submandibular, tonsillar, preauricular, posterior auricular or occipital adenopathy.   Skin:     General: Skin is warm and dry.   Neurological:      Mental Status: She is alert and oriented to person, place, and time.      Cranial Nerves: No cranial nerve deficit.      Sensory: No sensory deficit.   Psychiatric:         Behavior: Behavior normal.         Thought Content: Thought content normal.         Judgment: Judgment normal.             Diagnoses and health risks identified today and associated recommendations/orders:    1. Encounter for preventive health examination  Annual Health Risk Assessment (HRA) visit today.  Counseling and referral of health maintenance and preventative health measures performed.  Patient given annual wellness paperwork to take home.  Encouraged to return in 1 year for subsequent HRA visit.     2. Type 2 diabetes mellitus with diabetic polyneuropathy, unspecified whether long term insulin use  Chronic. Stable. Continue current treatment plan as previously prescribed by PCP.    3. Severe obesity (BMI 35.0-39.9) with comorbidity  Chronic. Stable. Continue current treatment plan as previously prescribed by PCP.    4. Idiopathic ventricular tachycardia  Chronic. Stable. Continue current treatment plan as  previously prescribed by PCP.    5. Ocular hypertension, unspecified laterality  Chronic. Stable. Continue current treatment plan as previously prescribed by PCP.    6. Essential hypertension  Chronic. Stable. Controlled. Encouraged to increase exercise as tolerated (moderate-intensity aerobic activity and muscle-strengthening activities) improve diet to heart healthy, low sodium diet.  Continue current treatment plan as previously prescribed by PCP.    7. [place] cardiac risk >20% in next 10 years  Chronic. Stable. Continue current treatment plan as previously prescribed by PCP.    8. Newly recognized heart murmur  Chronic. Stable. Continue current treatment plan as previously prescribed by PCP.    9. Ventricular tachycardia  Chronic. Stable. Continue current treatment plan as previously prescribed by PCP.    10. Overactive bladder  Chronic. Stable. Continue current treatment plan as previously prescribed by PCP.    11. Family history of ovarian cancer  Chronic. Stable. Continue current treatment plan as previously prescribed by PCP.    12. Controlled type 2 diabetes mellitus without complication, without long-term current use of insulin  Chronic. Stable. Last Hgb A1c=6.0 from 5/25/23. Continue current treatment plan as previously prescribed by PCP.    13. Obesity (BMI 30.0-34.9)  Chronic. Stable. Continue current treatment plan as previously prescribed by PCP.    14. Thyroid cyst  Chronic. Stable. Continue current treatment plan as previously prescribed by PCP.    15. Vitamin D deficiency  Chronic. Stable. Continue current treatment plan as previously prescribed by PCP.    16. Osteopenia, unspecified location  Chronic. Stable. Continue current treatment plan as previously prescribed by PCP.      Provided Jennifer with a 5-10 year written screening schedule and personal prevention plan. Recommendations were developed using the USPSTF age appropriate recommendations. Education, counseling, and referrals were  provided as needed. After Visit Summary printed and given to patient which includes a list of additional screenings\tests needed.      I offered to discuss end of life issues, including information on how to make advance directives that the patient could use to name someone who would make medical decisions on their behalf if they became too ill to make themselves.    ___Patient declined  _X_Patient is interested, I provided paper work and offered to discuss.    Follow up in about 1 year (around 2/20/2025).    Mark Rivers NP  I offered to discuss advanced care planning, including how to pick a person who would make decisions for you if you were unable to make them for yourself, called a health care power of , and what kind of decisions you might make such as use of life sustaining treatments such as ventilators and tube feeding when faced with a life limiting illness recorded on a living will that they will need to know. (How you want to be cared for as you near the end of your natural life)     X Patient is interested in learning more about how to make advanced directives.  I provided them paperwork and offered to discuss this with them.

## 2024-02-20 NOTE — PATIENT INSTRUCTIONS
Counseling and Referral of Other Preventative  (Italic type indicates deductible and co-insurance are waived)    Patient Name: Jennifer Motley  Today's Date: 2/20/2024    Health Maintenance       Date Due Completion Date    Shingles Vaccine (1 of 2) Never done ---    RSV Vaccine (Age 60+ and Pregnant patients) (1 - 1-dose 60+ series) Never done ---    Hemoglobin A1c 04/19/2024 10/19/2023    Override on 5/8/2018: Done    Colorectal Cancer Screening 06/07/2024 6/7/2021    Diabetes Urine Screening 08/23/2024 8/23/2023    Lipid Panel 08/23/2024 8/23/2023    Override on 9/16/2021: Done    Override on 4/20/2018: Done    Override on 10/1/2016: Done    Foot Exam 12/14/2024 12/14/2023    Override on 7/25/2019: Done    Override on 5/22/2018: Done    Mammogram 12/20/2024 12/20/2023    Override on 12/28/2020: Done    Override on 10/17/2019: Done    Eye Exam 02/12/2025 2/12/2024    Override on 11/13/2020: Done    Override on 4/23/2018: Done    DEXA Scan 10/18/2026 10/18/2023    Override on 6/1/2015: Done    TETANUS VACCINE 10/11/2030 10/11/2020        No orders of the defined types were placed in this encounter.    The following information is provided to all patients.  This information is to help you find resources for any of the problems found today that may be affecting your health:                  Living healthy guide: www.[facility].[place].gov      Understanding Diabetes: www.diabetes.org      Eating healthy: www.cdc.gov/healthyweight      CDC home safety checklist: www.cdc.gov/steadi/patient.html      Agency on Aging: www.goea.[place].gov      Alcoholics anonymous (AA): www.aa.org      Physical Activity: www.[place].nih.gov/xp6xlgj      Tobacco use: www.quitwithusla.org

## 2024-02-21 ENCOUNTER — OFFICE VISIT (OUTPATIENT)
Dept: INTERNAL MEDICINE | Facility: CLINIC | Age: 74
End: 2024-02-21
Payer: MEDICARE

## 2024-02-21 VITALS
OXYGEN SATURATION: 98 % | WEIGHT: 207.44 LBS | SYSTOLIC BLOOD PRESSURE: 114 MMHG | HEART RATE: 73 BPM | DIASTOLIC BLOOD PRESSURE: 60 MMHG | BODY MASS INDEX: 36.75 KG/M2

## 2024-02-21 DIAGNOSIS — E11.42 TYPE 2 DIABETES MELLITUS WITH DIABETIC POLYNEUROPATHY, UNSPECIFIED WHETHER LONG TERM INSULIN USE: ICD-10-CM

## 2024-02-21 DIAGNOSIS — E53.8 VITAMIN B12 DEFICIENCY: ICD-10-CM

## 2024-02-21 DIAGNOSIS — I10 ESSENTIAL HYPERTENSION: ICD-10-CM

## 2024-02-21 DIAGNOSIS — M85.80 OSTEOPENIA, UNSPECIFIED LOCATION: ICD-10-CM

## 2024-02-21 DIAGNOSIS — Z00.00 HEALTH MAINTENANCE EXAMINATION: Primary | ICD-10-CM

## 2024-02-21 DIAGNOSIS — Z91.89 FRAMINGHAM CARDIAC RISK 10-20% IN NEXT 10 YEARS: ICD-10-CM

## 2024-02-21 DIAGNOSIS — E55.9 VITAMIN D DEFICIENCY: ICD-10-CM

## 2024-02-21 PROCEDURE — 1126F AMNT PAIN NOTED NONE PRSNT: CPT | Mod: HCNC,CPTII,S$GLB, | Performed by: STUDENT IN AN ORGANIZED HEALTH CARE EDUCATION/TRAINING PROGRAM

## 2024-02-21 PROCEDURE — 4010F ACE/ARB THERAPY RXD/TAKEN: CPT | Mod: HCNC,CPTII,S$GLB, | Performed by: STUDENT IN AN ORGANIZED HEALTH CARE EDUCATION/TRAINING PROGRAM

## 2024-02-21 PROCEDURE — 3008F BODY MASS INDEX DOCD: CPT | Mod: HCNC,CPTII,S$GLB, | Performed by: STUDENT IN AN ORGANIZED HEALTH CARE EDUCATION/TRAINING PROGRAM

## 2024-02-21 PROCEDURE — 99213 OFFICE O/P EST LOW 20 MIN: CPT | Mod: HCNC,S$GLB,, | Performed by: STUDENT IN AN ORGANIZED HEALTH CARE EDUCATION/TRAINING PROGRAM

## 2024-02-21 PROCEDURE — 1159F MED LIST DOCD IN RCRD: CPT | Mod: HCNC,CPTII,S$GLB, | Performed by: STUDENT IN AN ORGANIZED HEALTH CARE EDUCATION/TRAINING PROGRAM

## 2024-02-21 PROCEDURE — 3074F SYST BP LT 130 MM HG: CPT | Mod: HCNC,CPTII,S$GLB, | Performed by: STUDENT IN AN ORGANIZED HEALTH CARE EDUCATION/TRAINING PROGRAM

## 2024-02-21 PROCEDURE — 3078F DIAST BP <80 MM HG: CPT | Mod: HCNC,CPTII,S$GLB, | Performed by: STUDENT IN AN ORGANIZED HEALTH CARE EDUCATION/TRAINING PROGRAM

## 2024-02-21 PROCEDURE — 99999 PR PBB SHADOW E&M-EST. PATIENT-LVL IV: CPT | Mod: PBBFAC,HCNC,, | Performed by: STUDENT IN AN ORGANIZED HEALTH CARE EDUCATION/TRAINING PROGRAM

## 2024-02-21 NOTE — PROGRESS NOTES
Subjective:       Patient ID: Jennifer Motley is a 73 y.o. female.    Chief Complaint: Health maintenance examination [Z00.00]    Patient is established with me, here today for the following:    T2DM, HTN, osteopenia, vitamin D, glaucoma, vitamin B12    Health maintenance -   Cologuard negative JUNE2021.   Denies family history of colorectal cancer.  Mammogram BI-RADS 1 in DEC2023.   History of abnormal mammogram.  Denies family history of breast cancer.  Denies family history of ovarian cancer.  Completed pap screening.  Denies history of abnormal pap smear.  Family history of cardiac disease.  UTD on Tdap, COVID, influenza, PPSV23, PCV13 vaccinations.  Due for RSV, shingles vaccinations.  Never smoker.  Denies drug use.  Completed hepatitis C screening.      HTN -   Currently prescribed valsartan, verapamil, HCTZ.  Patient endorses taking medication as directed.  Denies side effects or concerns while taking medication.  Lab Results       Component                Value               Date                       MICALBCREAT              6.4                 08/23/2023            BP Readings from Last 5 Encounters:  02/20/24 : 110/62  12/14/23 : (!) 133/56  10/05/23 : 131/74  08/23/23 : 128/60  07/18/23 : 130/70     T2DM -   Currently taking metformin and Rybelsus 3 mg daily  Following with Dr. Garsia routinely for endocrinology.  Just completed labs ordered by Dr. Garsia yesterday.   Taking ASA daily as directed   UTD on foot exam, last completed DEC2023.  UTD on eye exam, last completed FEB2024.  Lab Results       Component                Value               Date                       HGBA1C                   6.1 (H)             08/23/2023                 HGBA1C                   6.0 (H)             05/25/2023                 HGBA1C                   6.3 (H)             11/15/2022            Lab Results       Component                Value               Date                       MICALBCREAT              6.4                "  08/23/2023              Elevated ASCVD risk score -   Endorses overall healthy diet.   Endorses exercising routinely.  Lab Results       Component                Value               Date                       CHOL                     162                 08/23/2023            Lab Results       Component                Value               Date                       TRIG                     91                  08/23/2023            Lab Results       Component                Value               Date                       LDLCALC                  90.8                08/23/2023            Lab Results       Component                Value               Date                       HDL                      53                  08/23/2023            The 10-year ASCVD risk score (Chris TATUM, et al., 2019) is: 19.1%     Osteopenia -   Vitamin D deficiency -   Completed DEXA in OCT2023.  Showed normal bone density.  "Major osteoporotic fracture: 5.1%   Hip fracture: 0.3% "  Family history of osteoporosis.   Currently taking vitamin D3 daily.  Currently taking calcium daily.  Lab Results       Component                Value               Date                       [id number]             48                  08/23/2023                 [id number]             46                  11/16/2020               Vitamin B12 deficiency -  Currently taking B complex daily supplementation.  Lab Results       Component                Value               Date                       [id number]               1218 (H)            09/08/2023                   Review of Systems   Constitutional:  Negative for appetite change, chills, fatigue, fever and unexpected weight change.   Respiratory:  Negative for cough and shortness of breath.    Cardiovascular:  Negative for chest pain, palpitations and leg swelling.   Gastrointestinal:  Negative for abdominal pain, constipation, diarrhea, nausea and vomiting.   Skin:  Negative for rash.   Neurological:  " Negative for dizziness, syncope, weakness and headaches.         Current Outpatient Medications   Medication Instructions    aspirin (ECOTRIN) 81 mg, Oral, Daily    B-complex with vitamin C (SUPER B COMPLEX-VITAMIN C) tablet 1 tablet, Oral, Daily    brinzolamide-brimonidine (SIMBRINZA) 1-0.2 % DrpS 1 drop, Both Eyes, 3 times daily    calcium-vitamin D3 500 mg(1,250mg) -200 unit per tablet 1 tablet, Oral, Daily    cranberry 500 mg Cap 1 capsule, Oral, Daily    FLAXSEED ORAL 1 tablet, Oral, Daily    hydroCHLOROthiazide (HYDRODIURIL) 25 mg, Oral, Daily    latanoprost 0.005 % ophthalmic solution 1 drop, Both Eyes, Nightly    metFORMIN (GLUCOPHAGE) 500 mg, Oral, With breakfast    multivitamin capsule 1 capsule, Oral, Daily    omega-3 fatty acids/fish oil (FISH OIL-OMEGA-3 FATTY ACIDS) 300-1,000 mg capsule Oral, Daily    semaglutide (RYBELSUS) 3 mg, Oral, Daily    turmeric root extract 500 mg Cap 1 each, Oral, Daily    valsartan (DIOVAN) 160 mg, Oral    verapamiL (CALAN-SR) 180 mg, Oral, Daily     Objective:      Vitals:    02/21/24 1328   BP: 114/60   Pulse: 73   SpO2: 98%   Weight: 94.1 kg (207 lb 7.3 oz)   PainSc: 0-No pain     Body mass index is 36.75 kg/m².    Physical Exam  Vitals reviewed.   Constitutional:       General: She is not in acute distress.     Appearance: Normal appearance. She is not ill-appearing or diaphoretic.   HENT:      Head: Normocephalic and atraumatic.      Right Ear: Tympanic membrane, ear canal and external ear normal. There is no impacted cerumen.      Left Ear: Tympanic membrane, ear canal and external ear normal. There is no impacted cerumen.      Nose: Nose normal. No rhinorrhea.      Mouth/Throat:      Mouth: Mucous membranes are moist.      Pharynx: Oropharynx is clear. No oropharyngeal exudate or posterior oropharyngeal erythema.   Eyes:      General: No scleral icterus.        Right eye: No discharge.         Left eye: No discharge.      Conjunctiva/sclera: Conjunctivae normal.    Neck:      Thyroid: No thyromegaly or thyroid tenderness.      Trachea: Trachea normal.   Cardiovascular:      Rate and Rhythm: Normal rate and regular rhythm.      Heart sounds: Normal heart sounds. No murmur heard.     No friction rub. No gallop.   Pulmonary:      Effort: Pulmonary effort is normal. No respiratory distress.      Breath sounds: Normal breath sounds. No stridor. No wheezing, rhonchi or rales.   Abdominal:      General: There is no distension.      Palpations: Abdomen is soft.      Tenderness: There is no abdominal tenderness. There is no guarding or rebound.   Musculoskeletal:         General: No swelling or deformity.      Cervical back: Neck supple.   Lymphadenopathy:      Head:      Right side of head: No submandibular or posterior auricular adenopathy.      Left side of head: No submandibular or posterior auricular adenopathy.      Cervical: No cervical adenopathy.      Right cervical: No superficial, deep or posterior cervical adenopathy.     Left cervical: No superficial, deep or posterior cervical adenopathy.      Upper Body:      Right upper body: No supraclavicular adenopathy.      Left upper body: No supraclavicular adenopathy.   Skin:     General: Skin is warm and dry.   Neurological:      General: No focal deficit present.      Mental Status: She is alert. Mental status is at baseline.      Gait: Gait normal.   Psychiatric:         Mood and Affect: Mood normal.         Behavior: Behavior normal.         Assessment:       1. Health maintenance examination    2. Essential hypertension    3. Type 2 diabetes mellitus with diabetic polyneuropathy, unspecified whether long term insulin use    4. [place] cardiac risk 10-20% in next 10 years    5. Osteopenia, unspecified location    6. Vitamin D deficiency    7. Vitamin B12 deficiency        Plan:       Essential hypertension  Continue current medications.  RTC in 6 months for follow up.    Type 2 diabetes mellitus with diabetic  polyneuropathy, unspecified whether long term insulin use  Continue current medications.  RTC in 6 months for follow up.    [place] cardiac risk 10-20% in next 10 years  Declines statin therapy at this time  Continue healthy diet and lifestyle modifications  RTC in 6 months for follow up.    Osteopenia, unspecified location  Vitamin D deficiency  Continue regular weight bearing exercise  Continue supplementation.  RTC in 6 months for follow up.    Vitamin B12 deficiency  Continue supplementation.  RTC in 6 months for follow up.    Health maintenance examination  Reviewed and discussed age appropriate screenings and immunizations.      29 minutes were spent in chart review, documentation and review of results, and evaluation, treatment, and counseling of patient on the same day of service.    Betsy Warner MD  2/21/2024

## 2024-03-06 DIAGNOSIS — H40.1131 PRIMARY OPEN ANGLE GLAUCOMA (POAG) OF BOTH EYES, MILD STAGE: ICD-10-CM

## 2024-03-07 RX ORDER — BRINZOLAMIDE/BRIMONIDINE TARTRATE 10; 2 MG/ML; MG/ML
1 SUSPENSION/ DROPS OPHTHALMIC 3 TIMES DAILY
Qty: 24 ML | Refills: 3 | Status: SHIPPED | OUTPATIENT
Start: 2024-03-07 | End: 2024-04-22

## 2024-03-18 ENCOUNTER — PATIENT MESSAGE (OUTPATIENT)
Dept: ADMINISTRATIVE | Facility: HOSPITAL | Age: 74
End: 2024-03-18
Payer: MEDICARE

## 2024-03-19 ENCOUNTER — PATIENT OUTREACH (OUTPATIENT)
Dept: ADMINISTRATIVE | Facility: HOSPITAL | Age: 74
End: 2024-03-19
Payer: MEDICARE

## 2024-03-19 DIAGNOSIS — Z12.11 ENCOUNTER FOR COLORECTAL CANCER SCREENING: Primary | ICD-10-CM

## 2024-03-19 DIAGNOSIS — Z12.12 ENCOUNTER FOR COLORECTAL CANCER SCREENING: Primary | ICD-10-CM

## 2024-03-31 DIAGNOSIS — I10 ESSENTIAL HYPERTENSION: ICD-10-CM

## 2024-04-01 RX ORDER — HYDROCHLOROTHIAZIDE 25 MG/1
25 TABLET ORAL
Qty: 90 TABLET | Refills: 1 | Status: SHIPPED | OUTPATIENT
Start: 2024-04-01

## 2024-04-12 RX ORDER — VERAPAMIL HYDROCHLORIDE 180 MG/1
180 TABLET, FILM COATED, EXTENDED RELEASE ORAL
Qty: 90 TABLET | Refills: 3 | Status: SHIPPED | OUTPATIENT
Start: 2024-04-12

## 2024-04-21 NOTE — PROGRESS NOTES
HPI    DLS: 2024 Dr. Simons    1. POAG OU  2. NS OU  3. Type 2 DM no DR    MEDS:  Simbrinza TID OU  Travatan Z QHS OU    72 y/o male present to clinic for IOP check. Pt states no visual changing   since last visit. She using Glaucoma drops as directed. Simbrinza was not   covered by insurance.   Last edited by Supriya Simons MD on 2024 10:54 AM.      HPI    DLS: 2024 Dr. Simons    1. POAG OU  2. NS OU  3. Type 2 DM no DR    MEDS:  Simbrinza TID OU  Travatan Z QHS OU    72 y/o male present to clinic for IOP check. Pt states no visual changing   since last visit. She using Glaucoma drops as directed. Simbrinza was not   covered by insurance.   Last edited by Supriya Simons MD on 2024 10:54 AM.            Assessment /Plan     For exam results, see Encounter Report.    Primary open angle glaucoma (POAG) of both eyes, mild stage  -     dorzolamide (TRUSOPT) 2 % ophthalmic solution; Place 1 drop into both eyes 3 (three) times daily.  Dispense: 30 mL; Refill: 3  -     brimonidine 0.2% (ALPHAGAN) 0.2 % Drop; Place 1 drop into both eyes 3 (three) times daily.  Dispense: 30 mL; Refill: 3    Cataract, nuclear sclerotic, both eyes    Type 2 diabetes mellitus without ophthalmic manifestations        1. Patient followed by Dr. Balwinder Gregg for many years. - post Mary   Pre-Mary - was seeing dr Nadine Avila   Seen by Dr. Kebede in . Seen by Dr. Hensley (retina) for ? Mild armd  in . - taking AREDs but only 1 x day - is to F/U w/ her yearly        Glaucoma (type and duration)    Primary open angle glaucoma OU. Mild.    First HVF    (outside records - fullish)    First photos   10/2021    Treatment / Drops started   About            Family history    Mother  since 2019. H/o glaucoma surgery/drops.         Glaucoma meds    Dorzolamide TID OU. Travatan QHS OU.         H/O adverse rxn to glaucoma drops:   H/o bundle branch block. Taken off of beta blockers.          LASERS    None        GLAUCOMA SURGERIES    None        OTHER EYE SURGERIES    None        CDR    0.75/0.8        Tbase    ?? Off gtts           Tmax   23/22 - on gtts           Ttarget    18 ou - may need to be lower              HVF   - many old outside test - pt brought in - all near normal - Dr Balwinder Gregg     3 test 2022 to  2024 - SNS  (?new IAD)  od // ?SAD / INS  os        Gonio    +3 ou         CCT    505/ 507         OCT    3 test 2022 to 2024 - RNFL - dec G/TI, bord NS/NI od (?prog od) // bord TI  os (stable os)         Disc photos    10/18/21 with Dr. Simons    - Ttoday    17/17   (target 18 ou)   - Test done today    IOP -/  check on simbrinza     2, NS ou - mild monitor     3. Diabetes    No BDR     Plan:   Continue dorzolamide TID OU and Travatan QHS OU.  Avoid BB's - heart block   Monitor HVF's / and IOP and OCT going forward - to monitor for progression   + SNS od // ? INS os     Would be difficult to do slt - pt squeezes a lot with gonio     Pt records were given back to her - they have not been scanned into Epic - (10/18/2021)    But the important data has been - reviewed and abstracted     2/12/2024   Above target ou   ? OCT and VF prog od // ? VF porog os   Rec trial of brimonidine / simbrinza (sample given) - [facility]   Cont latanoprost ou (consider rocklatan)   Consider SLT - difficult gonio - squeezes     Sees outside retina doctor - Brenda Azar MD- gabrielle of macula os - monitor     IOP check with ? Simbrinza - IOP better - below target of 18 ou   If simbrinza  too expensive / change to Seperate bottles of brimonidine and dorzolamide    Cont travatain ou q hs     F/U 4 months IOP / gonio

## 2024-04-22 ENCOUNTER — OFFICE VISIT (OUTPATIENT)
Dept: OPHTHALMOLOGY | Facility: CLINIC | Age: 74
End: 2024-04-22
Payer: MEDICARE

## 2024-04-22 DIAGNOSIS — H25.13 CATARACT, NUCLEAR SCLEROTIC, BOTH EYES: ICD-10-CM

## 2024-04-22 DIAGNOSIS — H40.1131 PRIMARY OPEN ANGLE GLAUCOMA (POAG) OF BOTH EYES, MILD STAGE: Primary | ICD-10-CM

## 2024-04-22 DIAGNOSIS — E11.9 TYPE 2 DIABETES MELLITUS WITHOUT OPHTHALMIC MANIFESTATIONS: ICD-10-CM

## 2024-04-22 PROCEDURE — 1101F PT FALLS ASSESS-DOCD LE1/YR: CPT | Mod: HCNC,CPTII,S$GLB, | Performed by: OPHTHALMOLOGY

## 2024-04-22 PROCEDURE — 99999 PR PBB SHADOW E&M-EST. PATIENT-LVL III: CPT | Mod: PBBFAC,HCNC,, | Performed by: OPHTHALMOLOGY

## 2024-04-22 PROCEDURE — 99214 OFFICE O/P EST MOD 30 MIN: CPT | Mod: HCNC,S$GLB,, | Performed by: OPHTHALMOLOGY

## 2024-04-22 PROCEDURE — 1159F MED LIST DOCD IN RCRD: CPT | Mod: HCNC,CPTII,S$GLB, | Performed by: OPHTHALMOLOGY

## 2024-04-22 PROCEDURE — 1160F RVW MEDS BY RX/DR IN RCRD: CPT | Mod: HCNC,CPTII,S$GLB, | Performed by: OPHTHALMOLOGY

## 2024-04-22 PROCEDURE — 3288F FALL RISK ASSESSMENT DOCD: CPT | Mod: HCNC,CPTII,S$GLB, | Performed by: OPHTHALMOLOGY

## 2024-04-22 PROCEDURE — 1126F AMNT PAIN NOTED NONE PRSNT: CPT | Mod: HCNC,CPTII,S$GLB, | Performed by: OPHTHALMOLOGY

## 2024-04-22 PROCEDURE — 4010F ACE/ARB THERAPY RXD/TAKEN: CPT | Mod: HCNC,CPTII,S$GLB, | Performed by: OPHTHALMOLOGY

## 2024-04-22 RX ORDER — DORZOLAMIDE HCL 20 MG/ML
1 SOLUTION/ DROPS OPHTHALMIC 3 TIMES DAILY
Qty: 30 ML | Refills: 3 | Status: SHIPPED | OUTPATIENT
Start: 2024-04-22

## 2024-04-22 RX ORDER — BRIMONIDINE TARTRATE 2 MG/ML
1 SOLUTION/ DROPS OPHTHALMIC 3 TIMES DAILY
Qty: 30 ML | Refills: 3 | Status: SHIPPED | OUTPATIENT
Start: 2024-04-22

## 2024-04-24 ENCOUNTER — OFFICE VISIT (OUTPATIENT)
Dept: URGENT CARE | Facility: CLINIC | Age: 74
End: 2024-04-24
Payer: MEDICARE

## 2024-04-24 VITALS
OXYGEN SATURATION: 97 % | HEIGHT: 63 IN | RESPIRATION RATE: 20 BRPM | WEIGHT: 207 LBS | BODY MASS INDEX: 36.68 KG/M2 | TEMPERATURE: 99 F | DIASTOLIC BLOOD PRESSURE: 61 MMHG | SYSTOLIC BLOOD PRESSURE: 138 MMHG | HEART RATE: 72 BPM

## 2024-04-24 DIAGNOSIS — M25.569 KNEE PAIN, UNSPECIFIED CHRONICITY, UNSPECIFIED LATERALITY: Primary | ICD-10-CM

## 2024-04-24 DIAGNOSIS — M25.562 KNEE MENISCUS PAIN, LEFT: ICD-10-CM

## 2024-04-24 PROCEDURE — 99213 OFFICE O/P EST LOW 20 MIN: CPT | Mod: 25,S$GLB,, | Performed by: PHYSICIAN ASSISTANT

## 2024-04-24 PROCEDURE — 73562 X-RAY EXAM OF KNEE 3: CPT | Mod: LT,S$GLB,, | Performed by: RADIOLOGY

## 2024-04-24 PROCEDURE — 96372 THER/PROPH/DIAG INJ SC/IM: CPT | Mod: S$GLB,,, | Performed by: PHYSICIAN ASSISTANT

## 2024-04-24 RX ORDER — MELOXICAM 15 MG/1
15 TABLET ORAL DAILY
Qty: 7 TABLET | Refills: 0 | Status: SHIPPED | OUTPATIENT
Start: 2024-04-24 | End: 2024-05-01

## 2024-04-24 RX ORDER — KETOROLAC TROMETHAMINE 30 MG/ML
30 INJECTION, SOLUTION INTRAMUSCULAR; INTRAVENOUS
Status: COMPLETED | OUTPATIENT
Start: 2024-04-24 | End: 2024-04-24

## 2024-04-24 RX ADMIN — KETOROLAC TROMETHAMINE 30 MG: 30 INJECTION, SOLUTION INTRAMUSCULAR; INTRAVENOUS at 01:04

## 2024-04-24 NOTE — PROGRESS NOTES
"Subjective:      Patient ID: Jennifer Motley is a 73 y.o. female.    Vitals:  height is 5' 3" (1.6 m) and weight is 93.9 kg (207 lb). Her oral temperature is 98.5 °F (36.9 °C). Her blood pressure is 138/61 and her pulse is 72. Her respiration is 20 and oxygen saturation is 97%.     Chief Complaint: Knee Pain (Left knee pain)    Pt presents today with left knee pain and soreness. Pt states she has trouble sitting for a while and then getting up. Pt states she has been taking tylenol arthritis.    Knee Pain   The incident occurred 3 to 5 days ago. The incident occurred at home. There was no injury mechanism. The pain is present in the left knee. The quality of the pain is described as stabbing and aching. The pain is at a severity of 8/10. The pain is moderate. The pain has been Constant since onset. Associated symptoms include an inability to bear weight and muscle weakness. Pertinent negatives include no loss of motion, loss of sensation, numbness or tingling. She reports no foreign bodies present. The symptoms are aggravated by movement and weight bearing. She has tried acetaminophen for the symptoms. The treatment provided mild relief.       Musculoskeletal:  Positive for pain, joint pain and muscle ache. Negative for trauma, joint swelling, abnormal ROM of joint and muscle cramps.   Skin:  Negative for rash, erythema and bruising.   Neurological:  Negative for numbness, tingling and tremors.      Past Medical History:   Diagnosis Date    Cataract     Diabetes mellitus type II, controlled     Glaucoma     Hypertension     Idiopathic ventricular tachycardia     Obese     Ocular hypertension     Overactive bladder     Thyroid cyst        Past Surgical History:   Procedure Laterality Date    BREAST BIOPSY Right 2007    FRACTURE SURGERY Left     wrist    INSERTION OF IMPLANTABLE LOOP RECORDER N/A 11/22/2019    Procedure: INSERTION, IMPLANTABLE LOOP RECORDER;  Surgeon: Gerhard Cohen MD;  Location: [facility];  Service: " Cardiology;  Laterality: N/A;  SHARRI NORIEGA, MDT, Local, 3 Prep    TUBAL LIGATION         Family History   Problem Relation Name Age of Onset    Hypertension Mother      Stroke Mother      Glaucoma Mother      Cataracts Mother      Osteoporosis Mother      Diabetes Father      Cervical cancer Sister      Coronary artery disease Sister          CABG x 3     Heart attack Sister      Dementia Sister      Diabetes Sister      Stroke Brother      Coronary artery disease Brother          CABG    Colon cancer Neg Hx      Amblyopia Neg Hx      Blindness Neg Hx      Macular degeneration Neg Hx      Strabismus Neg Hx      Retinal detachment Neg Hx         Social History     Socioeconomic History    Marital status:    Tobacco Use    Smoking status: Never    Smokeless tobacco: Never   Substance and Sexual Activity    Alcohol use: Yes     Alcohol/week: 2.0 - 3.0 standard drinks of alcohol     Types: 2 - 3 Standard drinks or equivalent per week    Drug use: No    Sexual activity: Yes     Partners: Male     Social Determinants of Health     Financial Resource Strain: Medium Risk (2/19/2024)    Overall Financial Resource Strain (CARDIA)     Difficulty of Paying Living Expenses: Somewhat hard   Food Insecurity: No Food Insecurity (2/19/2024)    Hunger Vital Sign     Worried About Running Out of Food in the Last Year: Never true     Ran Out of Food in the Last Year: Never true   Transportation Needs: No Transportation Needs (2/19/2024)    PRAPARE - Transportation     Lack of Transportation (Medical): No     Lack of Transportation (Non-Medical): No   Physical Activity: Sufficiently Active (2/19/2024)    Exercise Vital Sign     Days of Exercise per Week: 4 days     Minutes of Exercise per Session: 50 min   Stress: Patient Declined (2/19/2024)    [employer] [place] [employer] - Occupational Stress Questionnaire     Feeling of Stress : Patient declined   Social Connections: Unknown (2/19/2024)    Social Connection and  Isolation Panel [NHANES]     Frequency of Communication with Friends and Family: More than three times a week     Frequency of Social Gatherings with Friends and Family: Once a week     Active Member of Clubs or Organizations: Yes     Attends Club or Organization Meetings: More than 4 times per year     Marital Status:    Housing Stability: Low Risk  (2/19/2024)    Housing Stability Vital Sign     Unable to Pay for Housing in the Last Year: No     Number of Places Lived in the Last Year: 1     Unstable Housing in the Last Year: No       Current Outpatient Medications   Medication Sig Dispense Refill    aspirin (ECOTRIN) 81 MG EC tablet Take 1 tablet (81 mg total) by mouth once daily. 90 tablet 3    B-complex with vitamin C (SUPER B COMPLEX-VITAMIN C) tablet Take 1 tablet by mouth once daily. 90 tablet 3    brimonidine 0.2% (ALPHAGAN) 0.2 % Drop Place 1 drop into both eyes 3 (three) times daily. 30 mL 3    calcium-vitamin D3 500 mg(1,250mg) -200 unit per tablet Take 1 tablet by mouth once daily.      cranberry 500 mg Cap Take 1 capsule by mouth once daily. 90 capsule 3    dorzolamide (TRUSOPT) 2 % ophthalmic solution Place 1 drop into both eyes 3 (three) times daily. 30 mL 3    FLAXSEED ORAL Take 1 tablet by mouth once daily at 6am.       hydroCHLOROthiazide (HYDRODIURIL) 25 MG tablet TAKE 1 TABLET ONE TIME DAILY 90 tablet 1    latanoprost 0.005 % ophthalmic solution Place 1 drop into both eyes every evening. 15 mL 3    metFORMIN (GLUCOPHAGE) 500 MG tablet Take 1 tablet (500 mg total) by mouth daily with breakfast. 90 tablet 3    multivitamin capsule Take 1 capsule by mouth once daily.      omega-3 fatty acids/fish oil (FISH OIL-OMEGA-3 FATTY ACIDS) 300-1,000 mg capsule Take by mouth once daily.      semaglutide (RYBELSUS) 3 mg tablet Take 3 mg by mouth once daily.      turmeric root extract 500 mg Cap Take 1 each by mouth once daily. 90 capsule 3    valsartan (DIOVAN) 160 MG tablet TAKE 1 TABLET ONE TIME  DAILY 90 tablet 3    verapamiL (CALAN-SR) 180 MG CR tablet TAKE 1 TABLET ONE TIME DAILY 90 tablet 3     No current facility-administered medications for this visit.       Review of patient's allergies indicates:   Allergen Reactions    Beta-blockers (beta-adrenergic blocking agts)      Patient has bundle branch block    Timolol maleate      Pt cannot take any beta blockers     Citric acid Hives     tomatoes    Oranges [orange] Rash    Strawberries [strawberry] Rash       Objective:     Physical Exam   Constitutional: She is oriented to person, place, and time.  Non-toxic appearance. She does not appear ill. No distress.   Pulmonary/Chest: Effort normal. No respiratory distress.   Abdominal: Normal appearance.   Musculoskeletal: Normal range of motion.         General: Tenderness present. No swelling, deformity or signs of injury. Normal range of motion.      Left knee: She exhibits bony tenderness and abnormal meniscus. She exhibits normal range of motion, no swelling, no ecchymosis, no deformity, no erythema, normal alignment, no LCL laxity, normal patellar mobility and no MCL laxity. Tenderness found. Medial joint line and LCL tenderness noted. No lateral joint line, no MCL and no patellar tendon tenderness noted.     Instability Tests: anterior drawer test positive and posterior drawer test positive     Left upper leg: Normal.        Legs:       Comments: Positive Thessaly for medial meniscus   Neurological: no focal deficit. She is alert and oriented to person, place, and time.   Skin: Skin is warm, dry, not diaphoretic, not pale and no rash. No bruising and No erythema   Psychiatric: Her behavior is normal. Mood, judgment and thought content normal.   Nursing note and vitals reviewed.  XR KNEE 3 VIEW LEFT    Result Date: 4/24/2024  EXAMINATION: XR KNEE 3 VIEW LEFT CLINICAL HISTORY: Pain in unspecified knee TECHNIQUE: AP, lateral, and Merchant views of the left knee were performed. COMPARISON: None FINDINGS:  There is no acute fracture, dislocation, or bone destruction.  There is mild tricompartmental joint space narrowing marginal osteophytosis.     Mild osteoarthritis of the left knee. Electronically signed by: Berta Beach MD Date:    04/24/2024 Time:    13:15       Assessment:     1. Knee pain, unspecified chronicity, unspecified laterality    2. Knee meniscus pain, left        Plan:       Knee pain, unspecified chronicity, unspecified laterality  -     XR KNEE 3 VIEW LEFT; Future; Expected date: 04/24/2024  -     KNEE BRACE FOR HOME USE  -     ketorolac injection 30 mg  -     meloxicam (MOBIC) 15 MG tablet; Take 1 tablet (15 mg total) by mouth once daily. for 7 days  Dispense: 7 tablet; Refill: 0  -     MRI Knee Without Contrast Left; Future; Expected date: 04/24/2024  -     Ambulatory referral/consult to Orthopedics    Knee meniscus pain, left  -     KNEE BRACE FOR HOME USE  -     ketorolac injection 30 mg  -     meloxicam (MOBIC) 15 MG tablet; Take 1 tablet (15 mg total) by mouth once daily. for 7 days  Dispense: 7 tablet; Refill: 0  -     MRI Knee Without Contrast Left; Future; Expected date: 04/24/2024  -     Ambulatory referral/consult to Orthopedics              I have reviewed the patient chart and pertinent past imaging/labs.  Results reviewed  Patient Instructions   X-ray reveals mild arthritis. As we discussed you would like to call and set up your appointment for the imaging.  Please call [phone] or [phone].  We will follow up with results and recommendations once available.      A referral was placed for you someone should contact you however if you do not hear from them in a week please call [phone] to schedule appointment.    You were given a Toradol injection today.  Do not start meloxicam until tomorrow make sure to take with food.  Do not take ibuprofen or Advil with either these medications.  Tylenol is safe.  Ice 15 minutes at a time 4 to 5 times a day.  Wear knee brace for  support and pain relief.  Follow up with primary care provider while waiting on orthopedic referral

## 2024-04-24 NOTE — PATIENT INSTRUCTIONS
X-ray reveals mild arthritis. As we discussed you would like to call and set up your appointment for the imaging.  Please call [phone] or [phone].  We will follow up with results and recommendations once available.      A referral was placed for you someone should contact you however if you do not hear from them in a week please call [phone] to schedule appointment.    You were given a Toradol injection today.  Do not start meloxicam until tomorrow make sure to take with food.  Do not take ibuprofen or Advil with either these medications.  Tylenol is safe.  Ice 15 minutes at a time 4 to 5 times a day.  Wear knee brace for support and pain relief.  Follow up with primary care provider while waiting on orthopedic referral

## 2024-04-29 ENCOUNTER — HOSPITAL ENCOUNTER (OUTPATIENT)
Dept: RADIOLOGY | Facility: HOSPITAL | Age: 74
Discharge: HOME | End: 2024-04-29
Attending: PHYSICIAN ASSISTANT
Payer: MEDICARE

## 2024-04-29 ENCOUNTER — TELEPHONE (OUTPATIENT)
Dept: URGENT CARE | Facility: CLINIC | Age: 74
End: 2024-04-29
Payer: MEDICARE

## 2024-04-29 DIAGNOSIS — M25.562 KNEE MENISCUS PAIN, LEFT: ICD-10-CM

## 2024-04-29 DIAGNOSIS — M25.569 KNEE PAIN, UNSPECIFIED CHRONICITY, UNSPECIFIED LATERALITY: ICD-10-CM

## 2024-04-29 PROCEDURE — 73721 MRI JNT OF LWR EXTRE W/O DYE: CPT | Mod: TC,LT

## 2024-04-29 PROCEDURE — 73721 MRI JNT OF LWR EXTRE W/O DYE: CPT | Mod: 26,LT,, | Performed by: RADIOLOGY

## 2024-04-29 NOTE — TELEPHONE ENCOUNTER
Attempted to contact patient to discuss MRI result. No answer. She has an upcoming appointment on 5/2/24 with Ortho.

## 2024-05-02 ENCOUNTER — OFFICE VISIT (OUTPATIENT)
Dept: ORTHOPEDICS | Facility: CLINIC | Age: 74
End: 2024-05-02
Payer: MEDICARE

## 2024-05-02 VITALS — BODY MASS INDEX: 35.09 KG/M2 | HEIGHT: 64 IN | WEIGHT: 205.56 LBS

## 2024-05-02 DIAGNOSIS — S83.242A ACUTE MEDIAL MENISCUS TEAR OF LEFT KNEE, INITIAL ENCOUNTER: ICD-10-CM

## 2024-05-02 DIAGNOSIS — M17.12 PRIMARY OSTEOARTHRITIS OF LEFT KNEE: Primary | ICD-10-CM

## 2024-05-02 PROCEDURE — 3008F BODY MASS INDEX DOCD: CPT | Mod: CPTII,S$GLB,,

## 2024-05-02 PROCEDURE — 20610 DRAIN/INJ JOINT/BURSA W/O US: CPT | Mod: LT,S$GLB,,

## 2024-05-02 PROCEDURE — 1125F AMNT PAIN NOTED PAIN PRSNT: CPT | Mod: CPTII,S$GLB,,

## 2024-05-02 PROCEDURE — 3288F FALL RISK ASSESSMENT DOCD: CPT | Mod: CPTII,S$GLB,,

## 2024-05-02 PROCEDURE — 4010F ACE/ARB THERAPY RXD/TAKEN: CPT | Mod: CPTII,S$GLB,,

## 2024-05-02 PROCEDURE — 1160F RVW MEDS BY RX/DR IN RCRD: CPT | Mod: CPTII,S$GLB,,

## 2024-05-02 PROCEDURE — 99214 OFFICE O/P EST MOD 30 MIN: CPT | Mod: 25,S$GLB,,

## 2024-05-02 PROCEDURE — 1101F PT FALLS ASSESS-DOCD LE1/YR: CPT | Mod: CPTII,S$GLB,,

## 2024-05-02 PROCEDURE — 1159F MED LIST DOCD IN RCRD: CPT | Mod: CPTII,S$GLB,,

## 2024-05-02 PROCEDURE — 99999 PR PBB SHADOW E&M-EST. PATIENT-LVL III: CPT | Mod: PBBFAC,,,

## 2024-05-02 RX ORDER — TRIAMCINOLONE ACETONIDE 40 MG/ML
40 INJECTION, SUSPENSION INTRA-ARTICULAR; INTRAMUSCULAR ONCE
Status: COMPLETED | OUTPATIENT
Start: 2024-05-02 | End: 2024-05-02

## 2024-05-02 RX ADMIN — TRIAMCINOLONE ACETONIDE 40 MG: 40 INJECTION, SUSPENSION INTRA-ARTICULAR; INTRAMUSCULAR at 03:05

## 2024-05-02 NOTE — PROGRESS NOTES
SUBJECTIVE:     Chief Complaint & History of Present Illness:  Jennifer Motley is a 73 y.o. female who is seen here today with a complaint of the pain. The pain has been present for about 1 1/2 weeks following an increase of activity. The patient describes the pain as a moderate dull throb located in the medial knee. The pain is worse with any weight bearing and squatting and mildly improved by acetaminophen 650 mg. The patient notes effusion but no associated injury, knee giving out, knee locking, crepitus sensation.  She was seen in urgent care on 04/24/2024, in which she received IM CSI that gave her mild-to-moderate relief.      Past Medical History:   Diagnosis Date    Cataract     Diabetes mellitus type II, controlled     Glaucoma     Hypertension     Idiopathic ventricular tachycardia     Obese     Ocular hypertension     Overactive bladder     Thyroid cyst        Past Surgical History:   Procedure Laterality Date    BREAST BIOPSY Right 2007    FRACTURE SURGERY Left     wrist    INSERTION OF IMPLANTABLE LOOP RECORDER N/A 11/22/2019    Procedure: INSERTION, IMPLANTABLE LOOP RECORDER;  Surgeon: Gerhard Cohen MD;  Location: [facility];  Service: Cardiology;  Laterality: N/A;  VT, ILAMAYA, MDT, Local, 3 Prep    TUBAL LIGATION         Family History   Problem Relation Name Age of Onset    Hypertension Mother      Stroke Mother      Glaucoma Mother      Cataracts Mother      Osteoporosis Mother      Diabetes Father      Cervical cancer Sister      Coronary artery disease Sister          CABG x 3     Heart attack Sister      Dementia Sister      Diabetes Sister      Stroke Brother      Coronary artery disease Brother          CABG    Colon cancer Neg Hx      Amblyopia Neg Hx      Blindness Neg Hx      Macular degeneration Neg Hx      Strabismus Neg Hx      Retinal detachment Neg Hx         Review of patient's allergies indicates:   Allergen Reactions    Beta-blockers (beta-adrenergic blocking agts)      Patient has  bundle branch block    Timolol maleate      Pt cannot take any beta blockers     Citric acid Hives     tomatoes    Oranges [orange] Rash    Strawberries [strawberry] Rash           Current Outpatient Medications:     B-complex with vitamin C (SUPER B COMPLEX-VITAMIN C) tablet, Take 1 tablet by mouth once daily., Disp: 90 tablet, Rfl: 3    brimonidine 0.2% (ALPHAGAN) 0.2 % Drop, Place 1 drop into both eyes 3 (three) times daily., Disp: 30 mL, Rfl: 3    calcium-vitamin D3 500 mg(1,250mg) -200 unit per tablet, Take 1 tablet by mouth once daily., Disp: , Rfl:     cranberry 500 mg Cap, Take 1 capsule by mouth once daily., Disp: 90 capsule, Rfl: 3    dorzolamide (TRUSOPT) 2 % ophthalmic solution, Place 1 drop into both eyes 3 (three) times daily., Disp: 30 mL, Rfl: 3    FLAXSEED ORAL, Take 1 tablet by mouth once daily at 6am. , Disp: , Rfl:     hydroCHLOROthiazide (HYDRODIURIL) 25 MG tablet, TAKE 1 TABLET ONE TIME DAILY, Disp: 90 tablet, Rfl: 1    latanoprost 0.005 % ophthalmic solution, Place 1 drop into both eyes every evening., Disp: 15 mL, Rfl: 3    multivitamin capsule, Take 1 capsule by mouth once daily., Disp: , Rfl:     omega-3 fatty acids/fish oil (FISH OIL-OMEGA-3 FATTY ACIDS) 300-1,000 mg capsule, Take by mouth once daily., Disp: , Rfl:     semaglutide (RYBELSUS) 3 mg tablet, Take 3 mg by mouth once daily., Disp: , Rfl:     turmeric root extract 500 mg Cap, Take 1 each by mouth once daily., Disp: 90 capsule, Rfl: 3    valsartan (DIOVAN) 160 MG tablet, TAKE 1 TABLET ONE TIME DAILY, Disp: 90 tablet, Rfl: 3    verapamiL (CALAN-SR) 180 MG CR tablet, TAKE 1 TABLET ONE TIME DAILY, Disp: 90 tablet, Rfl: 3    aspirin (ECOTRIN) 81 MG EC tablet, Take 1 tablet (81 mg total) by mouth once daily., Disp: 90 tablet, Rfl: 3    metFORMIN (GLUCOPHAGE) 500 MG tablet, Take 1 tablet (500 mg total) by mouth daily with breakfast., Disp: 90 tablet, Rfl: 3      Review of Systems:  ROS:  The updated medical history is in the chart and  "has been reviewed. A review of systems is updated and there is no reported vision changes, ear/nose/mouth/throat complaints, chest pain, shortness of breath, abdominal pain, urological complaints, fevers or chills, psychiatric complaints. Musculoskeletal and neurologcial symptoms are as documented. All other systems are negative.      OBJECTIVE:     PHYSICAL EXAM:  Ht 5' 4" (1.626 m)   Wt 93.2 kg (205 lb 9.3 oz)   BMI 35.29 kg/m²   General: Pleasant, cooperative, NAD.  HEENT: NCAT, sclera nonicteric.  Lungs: Respirations are equal and unlabored.   Abdomen: Soft and non-tender.  CV: 2+ bilateral upper and lower extremity pulses.  Neuro: Sensation intact to light touch.  Skin: Intact throughout LE with no rashes, erythema, or lesions.  Extremities: No LE edema, NVI lower extremities. antalgic gait.    left Knee Exam:  Knee Range of Motion:  0-115   Effusion:  Mild  Condition of skin: intact  Location of tenderness: Medial joint line   Strength: normal  Stability: stable to testing    right Knee Exam:  Knee Range of Motion:  0-120   Effusion: none  Condition of skin: intact  Location of tenderness: None   Strength: normal  Stability: stable to testing  Varus /Valgus stress: normal  Karis: negative    Hip Examination:  full painless range of motion, without tenderness    RADIOGRAPHS:  X-rays of the left knee taken on 04/24/2024 personally reviewed. Imaging reveals mild tricompartmental joint space narrowing with no acute fractures or dislocations.    MRI without contrast of the left knee also taken on 04/24/2024 personally reviewed. Imaging reveals complex tear of the posterior horn of the medial meniscus with mild associated extrusion and prominent surrounding synovitis, degenerative free edge fraying of the lateral meniscus, and tricompartmental osteoarthritis with chondral loss most.      ASSESSMENT:       ICD-10-CM ICD-9-CM   1. Primary osteoarthritis of left knee  M17.12 715.16   2. Acute medial meniscus tear " of left knee, initial encounter  S83.242A 836.0       PLAN:     We discussed with the patient at length all the different treatment options available including anti-inflammatories, acetaminophen, rest, ice, knee strengthening exercise, occasional cortisone injections for temporary relief, Viscosupplimentation injections, arthroscopic debridement, osteotomy, and finally knee arthroplasty.     Knee Injection Procedure Note  Diagnosis: left knee degenerative arthritis  Indications: left knee pain  Procedure Details: Verbal consent was obtained for the procedure. The injection site was identified and the skin was prepared with alcohol. The left knee was injected from an anterolateral approach with 1 ml of Kenalog and 4 ml Lidocaine under sterile technique using a 22 gauge needle. The needle was removed and the area cleansed and dressed.  Complications:  Patient tolerated the procedure well.    she was advised to rest the knee today, using ice and elevation as needed for comfort and swelling.Immediate relief of the knee pain may be short lived and secondary to the lidocaine. she may have an increase in discomfort tonight followed by steady improvement over the next several days. It may take 1-2 weeks following the injection to get the full benefit of the medication.    Jennifer MICHELL Motley was advised to monitor her blood sugars closely over the next several days. The steroid may cause a rise in them. If her glucose levels rise to a point she is uncomfortable or he is unable to control them is is to contact his PCP or go immediately to the emergency department.     - Continue Tylenol 650 mg as needed for pain.    - Follow up in clinic as needed.

## 2024-06-24 ENCOUNTER — PATIENT OUTREACH (OUTPATIENT)
Dept: ADMINISTRATIVE | Facility: HOSPITAL | Age: 74
End: 2024-06-24
Payer: MEDICARE

## 2024-07-02 DIAGNOSIS — I47.29 IDIOPATHIC VENTRICULAR TACHYCARDIA: Primary | ICD-10-CM

## 2024-07-12 ENCOUNTER — TELEPHONE (OUTPATIENT)
Dept: ELECTROPHYSIOLOGY | Facility: CLINIC | Age: 74
End: 2024-07-12
Payer: MEDICARE

## 2024-07-13 ENCOUNTER — OFFICE VISIT (OUTPATIENT)
Dept: URGENT CARE | Facility: CLINIC | Age: 74
End: 2024-07-13
Payer: MEDICARE

## 2024-07-13 VITALS
SYSTOLIC BLOOD PRESSURE: 154 MMHG | DIASTOLIC BLOOD PRESSURE: 72 MMHG | TEMPERATURE: 98 F | HEIGHT: 64 IN | RESPIRATION RATE: 17 BRPM | BODY MASS INDEX: 35.08 KG/M2 | HEART RATE: 81 BPM | OXYGEN SATURATION: 98 % | WEIGHT: 205.5 LBS

## 2024-07-13 DIAGNOSIS — T78.40XA ALLERGIC REACTION, INITIAL ENCOUNTER: Primary | ICD-10-CM

## 2024-07-13 DIAGNOSIS — R21 RASH: ICD-10-CM

## 2024-07-13 PROCEDURE — 99213 OFFICE O/P EST LOW 20 MIN: CPT | Mod: S$GLB,,, | Performed by: NURSE PRACTITIONER

## 2024-07-13 RX ORDER — DEXAMETHASONE SODIUM PHOSPHATE 100 MG/10ML
8 INJECTION INTRAMUSCULAR; INTRAVENOUS
Status: SHIPPED | OUTPATIENT
Start: 2024-07-13

## 2024-07-13 RX ORDER — TRIAMCINOLONE ACETONIDE 1 MG/G
CREAM TOPICAL 2 TIMES DAILY
Qty: 80 G | Refills: 0 | Status: SHIPPED | OUTPATIENT
Start: 2024-07-13

## 2024-07-13 RX ORDER — HYDROXYZINE HYDROCHLORIDE 25 MG/1
25 TABLET, FILM COATED ORAL 3 TIMES DAILY PRN
Qty: 21 TABLET | Refills: 0 | Status: SHIPPED | OUTPATIENT
Start: 2024-07-13 | End: 2024-07-20

## 2024-07-13 NOTE — PATIENT INSTRUCTIONS
- You must understand that you have received an Urgent Care treatment only and that you may be released before all of your medical problems are known or treated.   - You, the patient, will arrange for follow up care as instructed.   - If your condition worsens or fails to improve we recommend that you receive another evaluation at the ER immediately or contact your PCP to discuss your concerns.   - You can call [phone] or [phone] to help schedule an appointment with the appropriate provider.    Please drink plenty of fluids. Please get plenty of rest.  Please return here or go to the Emergency Department for any concerns or worsening of condition.  If you were given a steroid shot in the clinic and have also been given a prescription for a steroid such as Prednisone or a Medrol Dose Pack, please begin taking them tomorrow. If you received a steroid shot today - this can elevate your blood pressure, elevate your blood sugar, water weight gain, nervous energy, redness to the face and dimpling of the skin where the shot goes in.     If you have a localized reactionit is ok to apply OTC  topical creams (e.g. Cortaid) as directed to the affected area.    Please take Claritin or Zyrtec or Allegra (24 hours) twice a day.  You can add Benadryl or Hydroxyzine as needed for itching, however these may make you drowsy, so do not  drive or operate heavy equipment or machinery while taking these medications.

## 2024-07-13 NOTE — PROGRESS NOTES
"Subjective:      Patient ID: Jennifer Motley is a 73 y.o. female.    Vitals:  height is 5' 4" (1.626 m) and weight is 93.2 kg (205 lb 7.5 oz). Her oral temperature is 98 °F (36.7 °C). Her blood pressure is 154/72 (abnormal) and her pulse is 81. Her respiration is 17 and oxygen saturation is 98%.     Chief Complaint: Rash    Pt is a 72 yo female who presents w/ rash on arms, face, neck, ankle, and back. Sx began last night. This morning rashes were red and elevated. Pt describes pain as stinging and itching. Hydrocortisone did not offer relief. Pt has allergy to strawberry and had strawberry ice cream a few days ago. No breathing issues.     Rash  This is a new problem. The current episode started yesterday. The problem has been gradually worsening since onset. The affected locations include the neck, abdomen, left arm and right arm. The rash is characterized by burning and itchiness. It is unknown if there was an exposure to a precipitant. Pertinent negatives include no facial edema, shortness of breath or sore throat. Past treatments include topical steroids and antihistamine. The treatment provided no relief. Her past medical history is significant for allergies.     HENT:  Negative for sore throat.    Respiratory:  Negative for shortness of breath.    Skin:  Positive for rash.      Objective:     Physical Exam   Constitutional: She is oriented to person, place, and time.   HENT:   Head: Normocephalic.   Ears:   Right Ear: External ear normal.   Left Ear: External ear normal.   Nose: Nose normal.   Mouth/Throat: Mucous membranes are moist.   Eyes: Conjunctivae are normal.   Cardiovascular: Normal rate.   Pulmonary/Chest: Effort normal.   Musculoskeletal: Normal range of motion.         General: Normal range of motion.   Neurological: She is alert and oriented to person, place, and time.   Skin: Skin is dry, rash and urticarial (diffuse urticarial rash to B arms, face, back of neck, abdomen).   Psychiatric: Her " behavior is normal.   Nursing note and vitals reviewed.      Assessment:     1. Allergic reaction, initial encounter    2. Rash        Plan:       Allergic reaction, initial encounter  -     dexAMETHasone injection 8 mg    Rash  -     dexAMETHasone injection 8 mg  -     hydrOXYzine HCL (ATARAX) 25 MG tablet; Take 1 tablet (25 mg total) by mouth 3 (three) times daily as needed for Itching.  Dispense: 21 tablet; Refill: 0  -     triamcinolone acetonide 0.1% (KENALOG) 0.1 % cream; Apply topically 2 (two) times daily.  Dispense: 80 g; Refill: 0      Patient Instructions   - You must understand that you have received an Urgent Care treatment only and that you may be released before all of your medical problems are known or treated.   - You, the patient, will arrange for follow up care as instructed.   - If your condition worsens or fails to improve we recommend that you receive another evaluation at the ER immediately or contact your PCP to discuss your concerns.   - You can call [phone] or [phone] to help schedule an appointment with the appropriate provider.    Please drink plenty of fluids. Please get plenty of rest.  Please return here or go to the Emergency Department for any concerns or worsening of condition.  If you were given a steroid shot in the clinic and have also been given a prescription for a steroid such as Prednisone or a Medrol Dose Pack, please begin taking them tomorrow. If you received a steroid shot today - this can elevate your blood pressure, elevate your blood sugar, water weight gain, nervous energy, redness to the face and dimpling of the skin where the shot goes in.     If you have a localized reactionit is ok to apply OTC  topical creams (e.g. Cortaid) as directed to the affected area.    Please take Claritin or Zyrtec or Allegra (24 hours) twice a day.  You can add Benadryl or Hydroxyzine as needed for itching, however these may make you drowsy, so do not  drive or operate  heavy equipment or machinery while taking these medications.

## 2024-07-20 ENCOUNTER — PATIENT MESSAGE (OUTPATIENT)
Dept: ELECTROPHYSIOLOGY | Facility: CLINIC | Age: 74
End: 2024-07-20
Payer: MEDICARE

## 2024-07-20 ENCOUNTER — PATIENT MESSAGE (OUTPATIENT)
Dept: INTERNAL MEDICINE | Facility: CLINIC | Age: 74
End: 2024-07-20
Payer: MEDICARE

## 2024-07-22 ENCOUNTER — TELEPHONE (OUTPATIENT)
Dept: INTERNAL MEDICINE | Facility: CLINIC | Age: 74
End: 2024-07-22
Payer: MEDICARE

## 2024-08-20 DIAGNOSIS — I47.29 IDIOPATHIC VENTRICULAR TACHYCARDIA: Primary | ICD-10-CM

## 2024-08-22 ENCOUNTER — OFFICE VISIT (OUTPATIENT)
Dept: ELECTROPHYSIOLOGY | Facility: CLINIC | Age: 74
End: 2024-08-22
Payer: MEDICARE

## 2024-08-22 ENCOUNTER — HOSPITAL ENCOUNTER (OUTPATIENT)
Dept: CARDIOLOGY | Facility: CLINIC | Age: 74
Discharge: HOME | End: 2024-08-22
Payer: MEDICARE

## 2024-08-22 VITALS
DIASTOLIC BLOOD PRESSURE: 66 MMHG | HEART RATE: 81 BPM | HEIGHT: 64 IN | WEIGHT: 207.69 LBS | SYSTOLIC BLOOD PRESSURE: 144 MMHG | BODY MASS INDEX: 35.46 KG/M2

## 2024-08-22 DIAGNOSIS — E11.42 TYPE 2 DIABETES MELLITUS WITH DIABETIC POLYNEUROPATHY, UNSPECIFIED WHETHER LONG TERM INSULIN USE: ICD-10-CM

## 2024-08-22 DIAGNOSIS — I10 ESSENTIAL HYPERTENSION: ICD-10-CM

## 2024-08-22 DIAGNOSIS — I47.29 IDIOPATHIC VENTRICULAR TACHYCARDIA: ICD-10-CM

## 2024-08-22 DIAGNOSIS — I47.29 IDIOPATHIC VENTRICULAR TACHYCARDIA: Primary | Chronic | ICD-10-CM

## 2024-08-22 LAB
OHS QRS DURATION: 80 MS
OHS QTC CALCULATION: 427 MS

## 2024-08-22 PROCEDURE — 93010 ELECTROCARDIOGRAM REPORT: CPT | Mod: HCNC,S$GLB,, | Performed by: INTERNAL MEDICINE

## 2024-08-22 PROCEDURE — 99999 PR PBB SHADOW E&M-EST. PATIENT-LVL IV: CPT | Mod: PBBFAC,HCNC,, | Performed by: INTERNAL MEDICINE

## 2024-08-22 RX ORDER — ORAL SEMAGLUTIDE 3 MG/1
3 TABLET ORAL DAILY
COMMUNITY

## 2024-08-22 NOTE — PROGRESS NOTES
Subjective:    Patient ID:  Jennifer Motley is a 73 y.o. female who presents for evaluation of No chief complaint on file.      Referring Cardiologist: Verónica Turk MD  Primary Care Physician: Jaqueline Byrnes MD    HPI  Prior Hx:  I had the pleasure of seeing Mrs. Motley today in our electrophysiology clinic in consultation for her ventricular arrhythmia. As you are aware she is a pleasant 73 year-old woman with obesity, hypertension and type 2 diabetes mellitus. She was first assessed by Dr. Turk in 2017 when she presented for evaluation of atypical chest pain. A stress echocardiogram was performed noting a normal LVEF, no arrhythmias during exercise, no ischemia, and a hypertensive response at peak stress (SBP 217mmHg). She was seen by Dr. Byrnes 7/2019 and reported burning type chest discomfort during stressful situations. A treadmill stress echocardiogram was ordered and performed on 8/20/2019 which showed a preserved LVEF and no ischemia however with peak exercise she developed PVCs, salvos of monomorphic VT and 1 34-beat run of monomorphic VT all of the same etiology of the PVCs (LBBB, V3 transition, inferiorly directed). She was asymptomatic from this event during her stress test. She denies any history of near syncope/syncope. She denies palpitations or exertional chest discomfort/palpitations/light-headedness. She regularly exercises on a treadmill. After discussion she elected to proceed with ILR implantation.    I reviewed available ECGs in Epic including the stress tests. ECGs consistent with sinus rhythm with normal QRS and intervals. PVCs/VT only noted on recent stress test.    ILR was implanted 11/2019. She presented for follow-up visit 8/2020. No VT noted thus far on monitoring.    10/2022: Mrs. Motley returned for yearly follow-up. She recently retired. She unfortunately lost her  and 2 brothers in the past year.     7/2023: Mrs Motley returns for virtual follow-up. ILR has  reached RRT. ILR monitoring over the past year shows no sustained arrhythmias. She elected to abandon the ILR.    Interim Hx:  Mrs. Motley returns for follow-up. Had an outside imaging screening and reportedly had an abnormal ECG with ST segment and QRS abnormality. ECG is not provided for me to review. She feels well.    Recent in clinic ECG is normal sinus rhythm with normal intervals.    Review of Systems   Constitutional: Negative for fever and malaise/fatigue.   HENT:  Negative for congestion and sore throat.    Eyes:  Negative for blurred vision and visual disturbance.   Cardiovascular:  Negative for chest pain, dyspnea on exertion, irregular heartbeat, leg swelling, near-syncope, orthopnea, palpitations, paroxysmal nocturnal dyspnea and syncope.   Respiratory:  Negative for cough and shortness of breath.    Hematologic/Lymphatic: Negative for bleeding problem. Does not bruise/bleed easily.   Skin: Negative.    Musculoskeletal: Negative.    Gastrointestinal:  Negative for bloating, abdominal pain and constipation.   Neurological:  Negative for dizziness and focal weakness.        Objective:    Physical Exam  Vitals reviewed.   Constitutional:       General: She is not in acute distress.     Appearance: Normal appearance. She is well-developed. She is not diaphoretic.   HENT:      Head: Normocephalic and atraumatic.   Eyes:      General:         Right eye: No discharge.         Left eye: No discharge.      Conjunctiva/sclera: Conjunctivae normal.   Cardiovascular:      Rate and Rhythm: Normal rate and regular rhythm.      Heart sounds: No murmur heard.     No friction rub. No gallop.   Pulmonary:      Effort: Pulmonary effort is normal. No respiratory distress.      Breath sounds: Normal breath sounds. No wheezing or rales.   Abdominal:      General: Bowel sounds are normal. There is no distension.      Palpations: Abdomen is soft.      Tenderness: There is no abdominal tenderness.   Musculoskeletal:       Cervical back: Neck supple.   Skin:     General: Skin is warm and dry.   Neurological:      Mental Status: She is alert and oriented to person, place, and time.   Psychiatric:         Mood and Affect: Mood normal.         Behavior: Behavior normal.         Thought Content: Thought content normal.         Judgment: Judgment normal.           Assessment:       1. Idiopathic ventricular tachycardia    2. Essential hypertension    3. Type 2 diabetes mellitus with diabetic polyneuropathy, unspecified whether long term insulin use         Plan:       In summary, Mrs. Motley is a pleasant 73 year-old woman with obesity, hypertension and type 2 diabetes mellitus presenting for evaluation of PVCs and idiopathic VT of the same etiology on an exercise stress test performed for chest discomfort aypical for angina/ischemia. She has no evidence of ischemia on the stress test and has a preserved LVEF. She has no history of near syncope or syncope. Her PVC/VT focus is consistent with an outflow tract source, with a V2 transition ratio favoring RVOT. Discussed at initial visit prognosis and morbidity with idiopathic VT (can carry a risk of syncope however overall low risk of increased mortality) and treatment options. She desired conservative management with verapamil/ILR implant. To date no VT observed on ILR. Device has reached RRT. She elected to abandon. Doing well. Continue verapamil.      RTC in 1 year.    Thank you for allowing me to participate in the care of this patient. Please do not hesitate to call me with any questions or concerns.    Gerhard Cohen MD, PhD  Cardiac Electrophysiology

## 2024-08-24 NOTE — PROGRESS NOTES
HPI    DLS: 2024    Pt here for 4 Month Check;  Pt states no eye pain or discomfort.     Meds;  Brimonidine TID OU  Dorzolamide TID OU  Travatan Z QHS OU    1. POAG OU   2. NS OU   3. Type 2 DM no        Last edited by Rossy Tijerina on 2024 10:54 AM.            Assessment /Plan     For exam results, see Encounter Report.    Primary open angle glaucoma (POAG) of both eyes, mild stage    Cataract, nuclear sclerotic, both eyes    Type 2 diabetes mellitus without ophthalmic manifestations    Primary open angle glaucoma of both eyes, unspecified glaucoma stage  -     latanoprost 0.005 % ophthalmic solution; Place 1 drop into both eyes every evening.  Dispense: 15 mL; Refill: 3        1. Patient followed by Dr. Balwinder Gregg for many years. - post Mary   Pre-Mary - was seeing dr Nadine Avila   Seen by Dr. Kebede in . Seen by Dr. Hensley (retina) for ? Mild armd  in . - taking AREDs but only 1 x day - is to F/U w/ her yearly        Glaucoma (type and duration)    Primary open angle glaucoma OU. Mild.    First HVF    (outside records - fullish)    First photos   10/2021    Treatment / Drops started   About            Family history    Mother  since . H/o glaucoma surgery/drops.         Glaucoma meds    Dorzolamide TID OU. // brimonidine tid ou  (simbrinza was too expensive) /  latanoprost  QHS OU.         H/O adverse rxn to glaucoma drops:   H/o bundle branch block. Taken off of beta blockers.         LASERS    None        GLAUCOMA SURGERIES    None        OTHER EYE SURGERIES    None        CDR    0.75/0.8        Tbase    ?? Off gtts           Tmax    - on gtts           Ttarget    18 ou - may need to be lower              HVF   - many old outside test - pt brought in - all near normal - Dr Balwinder Gregg     3 test  to   - SNS  (?new IAD)  od // ?SAD / INS  os        Gonio    +3 ou         CCT    505/ 507         OCT    3 test  to  - RNFL - dec G/TI, bord NS/NI  od (?prog od) // bord TI  os (stable os)         Disc photos    10/18/21 with Dr. Simons    - Ttmargarita    16/16   (target 18 ou)   - Test done today    IOP -/  check on dorz / brimo / lat     2, NS ou - mild monitor     3. Diabetes    No BDR     Plan:   Continue dorzolamide TID OU and Travatan QHS OU.  Avoid BB's - heart block   Monitor HVF's / and IOP and OCT going forward - to monitor for progression   + SNS od // ? INS os     Would be difficult to do slt - pt squeezes a lot with gonio     Pt records were given back to her - they have not been scanned into Epic - (10/18/2021)    But the important data has been - reviewed and abstracted     2/12/2024   Above target ou   ? OCT and VF prog od // ? VF porog os   Rec trial of brimonidine / simbrinza (sample given) - Rx sent to [facility]   Cont latanoprost ou (consider rocklatan)   Consider SLT - difficult gonio - squeezes     Sees outside retina doctor - Brenda Azar MD- drusen of macula os - monitor    8/26/2024   IOP better with simbrinza - separate bottles 2/2 cost   Cont dorz tid   Cont brim tid   Cont lat q hs   ?? If pt is a candidate for rhopressa or rocklatan prn     F/U 6 months with HVF / DFE / OCT

## 2024-08-26 ENCOUNTER — OFFICE VISIT (OUTPATIENT)
Dept: OPHTHALMOLOGY | Facility: CLINIC | Age: 74
End: 2024-08-26
Payer: MEDICARE

## 2024-08-26 DIAGNOSIS — H25.13 CATARACT, NUCLEAR SCLEROTIC, BOTH EYES: ICD-10-CM

## 2024-08-26 DIAGNOSIS — H40.1131 PRIMARY OPEN ANGLE GLAUCOMA (POAG) OF BOTH EYES, MILD STAGE: Primary | ICD-10-CM

## 2024-08-26 DIAGNOSIS — H40.1130 PRIMARY OPEN ANGLE GLAUCOMA OF BOTH EYES, UNSPECIFIED GLAUCOMA STAGE: ICD-10-CM

## 2024-08-26 DIAGNOSIS — E11.9 TYPE 2 DIABETES MELLITUS WITHOUT OPHTHALMIC MANIFESTATIONS: ICD-10-CM

## 2024-08-26 PROCEDURE — 1101F PT FALLS ASSESS-DOCD LE1/YR: CPT | Mod: HCNC,CPTII,S$GLB, | Performed by: OPHTHALMOLOGY

## 2024-08-26 PROCEDURE — 3288F FALL RISK ASSESSMENT DOCD: CPT | Mod: HCNC,CPTII,S$GLB, | Performed by: OPHTHALMOLOGY

## 2024-08-26 PROCEDURE — 4010F ACE/ARB THERAPY RXD/TAKEN: CPT | Mod: HCNC,CPTII,S$GLB, | Performed by: OPHTHALMOLOGY

## 2024-08-26 PROCEDURE — 99214 OFFICE O/P EST MOD 30 MIN: CPT | Mod: HCNC,S$GLB,, | Performed by: OPHTHALMOLOGY

## 2024-08-26 PROCEDURE — 1160F RVW MEDS BY RX/DR IN RCRD: CPT | Mod: HCNC,CPTII,S$GLB, | Performed by: OPHTHALMOLOGY

## 2024-08-26 PROCEDURE — 1159F MED LIST DOCD IN RCRD: CPT | Mod: HCNC,CPTII,S$GLB, | Performed by: OPHTHALMOLOGY

## 2024-08-26 PROCEDURE — 1126F AMNT PAIN NOTED NONE PRSNT: CPT | Mod: HCNC,CPTII,S$GLB, | Performed by: OPHTHALMOLOGY

## 2024-08-26 PROCEDURE — 99999 PR PBB SHADOW E&M-EST. PATIENT-LVL III: CPT | Mod: PBBFAC,HCNC,, | Performed by: OPHTHALMOLOGY

## 2024-08-26 RX ORDER — LATANOPROST 50 UG/ML
1 SOLUTION/ DROPS OPHTHALMIC NIGHTLY
Qty: 15 ML | Refills: 3 | Status: SHIPPED | OUTPATIENT
Start: 2024-08-26

## 2024-09-13 ENCOUNTER — LAB VISIT (OUTPATIENT)
Dept: LAB | Facility: OTHER | Age: 74
End: 2024-09-13
Attending: INTERNAL MEDICINE
Payer: MEDICARE

## 2024-09-13 ENCOUNTER — OFFICE VISIT (OUTPATIENT)
Dept: INTERNAL MEDICINE | Facility: CLINIC | Age: 74
End: 2024-09-13
Attending: INTERNAL MEDICINE
Payer: MEDICARE

## 2024-09-13 VITALS
BODY MASS INDEX: 35.56 KG/M2 | HEART RATE: 74 BPM | HEIGHT: 64 IN | WEIGHT: 208.31 LBS | OXYGEN SATURATION: 96 % | DIASTOLIC BLOOD PRESSURE: 52 MMHG | SYSTOLIC BLOOD PRESSURE: 124 MMHG

## 2024-09-13 DIAGNOSIS — E11.42 TYPE 2 DIABETES MELLITUS WITH DIABETIC POLYNEUROPATHY, UNSPECIFIED WHETHER LONG TERM INSULIN USE: ICD-10-CM

## 2024-09-13 DIAGNOSIS — E53.8 B12 DEFICIENCY: ICD-10-CM

## 2024-09-13 DIAGNOSIS — E66.01 SEVERE OBESITY (BMI 35.0-39.9) WITH COMORBIDITY: ICD-10-CM

## 2024-09-13 DIAGNOSIS — I10 ESSENTIAL HYPERTENSION: ICD-10-CM

## 2024-09-13 DIAGNOSIS — E55.9 VITAMIN D DEFICIENCY: Primary | ICD-10-CM

## 2024-09-13 DIAGNOSIS — Z91.89 FRAMINGHAM CARDIAC RISK >20% IN NEXT 10 YEARS: ICD-10-CM

## 2024-09-13 DIAGNOSIS — E55.9 VITAMIN D DEFICIENCY: ICD-10-CM

## 2024-09-13 DIAGNOSIS — Z53.20 STATIN DECLINED: ICD-10-CM

## 2024-09-13 DIAGNOSIS — I47.29 IDIOPATHIC VENTRICULAR TACHYCARDIA: Chronic | ICD-10-CM

## 2024-09-13 DIAGNOSIS — E04.1 THYROID CYST: ICD-10-CM

## 2024-09-13 LAB
25(OH)D3+25(OH)D2 SERPL-MCNC: 44 NG/ML (ref 30–96)
ALBUMIN SERPL BCP-MCNC: 4 G/DL (ref 3.5–5.2)
ALP SERPL-CCNC: 67 U/L (ref 55–135)
ALT SERPL W/O P-5'-P-CCNC: 25 U/L (ref 10–44)
ANION GAP SERPL CALC-SCNC: 11 MMOL/L (ref 8–16)
AST SERPL-CCNC: 17 U/L (ref 10–40)
BASOPHILS # BLD AUTO: 0.02 K/UL (ref 0–0.2)
BASOPHILS NFR BLD: 0.3 % (ref 0–1.9)
BILIRUB SERPL-MCNC: 0.3 MG/DL (ref 0.1–1)
BUN SERPL-MCNC: 17 MG/DL (ref 8–23)
CALCIUM SERPL-MCNC: 10.4 MG/DL (ref 8.7–10.5)
CHLORIDE SERPL-SCNC: 104 MMOL/L (ref 95–110)
CO2 SERPL-SCNC: 24 MMOL/L (ref 23–29)
CREAT SERPL-MCNC: 0.8 MG/DL (ref 0.5–1.4)
DIFFERENTIAL METHOD BLD: ABNORMAL
EOSINOPHIL # BLD AUTO: 0.1 K/UL (ref 0–0.5)
EOSINOPHIL NFR BLD: 1.3 % (ref 0–8)
ERYTHROCYTE [DISTWIDTH] IN BLOOD BY AUTOMATED COUNT: 14.2 % (ref 11.5–14.5)
EST. GFR  (NO RACE VARIABLE): >60 ML/MIN/1.73 M^2
GLUCOSE SERPL-MCNC: 109 MG/DL (ref 70–110)
HCT VFR BLD AUTO: 40.3 % (ref 37–48.5)
HGB BLD-MCNC: 12.8 G/DL (ref 12–16)
IMM GRANULOCYTES # BLD AUTO: 0.03 K/UL (ref 0–0.04)
IMM GRANULOCYTES NFR BLD AUTO: 0.4 % (ref 0–0.5)
LYMPHOCYTES # BLD AUTO: 1.9 K/UL (ref 1–4.8)
LYMPHOCYTES NFR BLD: 25.2 % (ref 18–48)
MCH RBC QN AUTO: 29.3 PG (ref 27–31)
MCHC RBC AUTO-ENTMCNC: 31.8 G/DL (ref 32–36)
MCV RBC AUTO: 92 FL (ref 82–98)
MONOCYTES # BLD AUTO: 0.5 K/UL (ref 0.3–1)
MONOCYTES NFR BLD: 6.5 % (ref 4–15)
NEUTROPHILS # BLD AUTO: 5 K/UL (ref 1.8–7.7)
NEUTROPHILS NFR BLD: 66.3 % (ref 38–73)
NRBC BLD-RTO: 0 /100 WBC
PLATELET # BLD AUTO: 210 K/UL (ref 150–450)
PMV BLD AUTO: 11.7 FL (ref 9.2–12.9)
POTASSIUM SERPL-SCNC: 4.1 MMOL/L (ref 3.5–5.1)
PROT SERPL-MCNC: 8.1 G/DL (ref 6–8.4)
RBC # BLD AUTO: 4.37 M/UL (ref 4–5.4)
SODIUM SERPL-SCNC: 139 MMOL/L (ref 136–145)
VIT B12 SERPL-MCNC: 725 PG/ML (ref 210–950)
WBC # BLD AUTO: 7.51 K/UL (ref 3.9–12.7)

## 2024-09-13 PROCEDURE — 1160F RVW MEDS BY RX/DR IN RCRD: CPT | Mod: HCNC,CPTII,S$GLB, | Performed by: INTERNAL MEDICINE

## 2024-09-13 PROCEDURE — 1159F MED LIST DOCD IN RCRD: CPT | Mod: HCNC,CPTII,S$GLB, | Performed by: INTERNAL MEDICINE

## 2024-09-13 PROCEDURE — 3008F BODY MASS INDEX DOCD: CPT | Mod: HCNC,CPTII,S$GLB, | Performed by: INTERNAL MEDICINE

## 2024-09-13 PROCEDURE — 1126F AMNT PAIN NOTED NONE PRSNT: CPT | Mod: HCNC,CPTII,S$GLB, | Performed by: INTERNAL MEDICINE

## 2024-09-13 PROCEDURE — 82306 VITAMIN D 25 HYDROXY: CPT | Mod: HCNC | Performed by: INTERNAL MEDICINE

## 2024-09-13 PROCEDURE — 82607 VITAMIN B-12: CPT | Mod: HCNC | Performed by: INTERNAL MEDICINE

## 2024-09-13 PROCEDURE — 99999 PR PBB SHADOW E&M-EST. PATIENT-LVL IV: CPT | Mod: PBBFAC,HCNC,, | Performed by: INTERNAL MEDICINE

## 2024-09-13 PROCEDURE — 3078F DIAST BP <80 MM HG: CPT | Mod: HCNC,CPTII,S$GLB, | Performed by: INTERNAL MEDICINE

## 2024-09-13 PROCEDURE — 80053 COMPREHEN METABOLIC PANEL: CPT | Mod: HCNC | Performed by: INTERNAL MEDICINE

## 2024-09-13 PROCEDURE — 1101F PT FALLS ASSESS-DOCD LE1/YR: CPT | Mod: HCNC,CPTII,S$GLB, | Performed by: INTERNAL MEDICINE

## 2024-09-13 PROCEDURE — 99215 OFFICE O/P EST HI 40 MIN: CPT | Mod: HCNC,S$GLB,, | Performed by: INTERNAL MEDICINE

## 2024-09-13 PROCEDURE — 4010F ACE/ARB THERAPY RXD/TAKEN: CPT | Mod: HCNC,CPTII,S$GLB, | Performed by: INTERNAL MEDICINE

## 2024-09-13 PROCEDURE — 85025 COMPLETE CBC W/AUTO DIFF WBC: CPT | Mod: HCNC | Performed by: INTERNAL MEDICINE

## 2024-09-13 PROCEDURE — 3074F SYST BP LT 130 MM HG: CPT | Mod: HCNC,CPTII,S$GLB, | Performed by: INTERNAL MEDICINE

## 2024-09-13 PROCEDURE — 36415 COLL VENOUS BLD VENIPUNCTURE: CPT | Mod: HCNC | Performed by: INTERNAL MEDICINE

## 2024-09-13 PROCEDURE — 3288F FALL RISK ASSESSMENT DOCD: CPT | Mod: HCNC,CPTII,S$GLB, | Performed by: INTERNAL MEDICINE

## 2024-09-13 NOTE — PROGRESS NOTES
Subjective:       Patient ID: Jennifer Motley is a 73 y.o. female.    Chief Complaint: Vitamin D deficiency [E55.9]    Pcp: mihir  Pt new to me     Here for 6 month diabetes f/u     73F with diabetes type 2, HTN, vit d def, b12 def, osteopenia, glaucoma, oa of knee, idiopathic v tach and pvcs    Had labs through [facility] reviewed this summer - sent through pt portal in pt advice request  To have labs through endo next week - followed by outside provider for diabetes    HM:  Rsv  Shingrix  Covid  Flu   Urine screening     Had health screening on Friday through [facility]  - simple right kidney cyst and simple thyroid nodule/cyst seen - since then had us through her endocrinologist clinic and to receive results at appt next week     Idiopathic v tach:   Saw ep cards 8/22/2024 - to rtc in 1 year  - seen on stress test for chest pain - negative   - pt opted for conservative mgmt with verapamil/ilr implant - per lcv no vt observed on ilr    HTN:  - taking valsartan, verapamil, HCTZ.  Patient endorses taking medication as directed.  Denies side effects or concerns while taking medication.     Diabetes type 2:    A1c 6.1  Rybelsus 7mg and off of metformin with this increase   Following with Dr. Garsia routinely for endocrinology - [place]  Just completed labs ordered by Dr. Garsia yesterday.   Taking ASA daily as directed   UTD on foot exam, last completed DEC2023.  UTD on eye exam, last completed FEB2024.  Not on Statin - declines     Elevated ASCVD risk score, statin declined:   Endorses overall healthy diet.   Endorses exercising routinely.  Declines statin today   The 10-year ASCVD risk score (Chris TATUM, et al., 2019) is: 23%    Values used to calculate the score:      Age: 73 years      Sex: Female      Is Non- : Yes      Diabetic: Yes      Tobacco smoker: No      Systolic Blood Pressure: 124 mmHg      Is BP treated: Yes      HDL Cholesterol: 53 mg/dL      Total Cholesterol: 162 mg/dL     Osteopenia ,  "Vitamin D deficiency, fam hx of osteoporosis:   Completed DEXA in OCT2023: normal bone density. "Major osteoporotic fracture: 5.1%   Hip fracture: 0.3% "  Currently taking vitamin D3 daily.  Currently taking calcium daily.    Vitamin B12 deficiency:  Currently taking B complex daily supplementation.    Endo: incer  Pod: luper  Ophthal: lotfeild   EP cards: almodovar - idiopathic vtach  Ortho: moseley - knee    [facility]   Constitutional:  Negative for appetite change, chills, fatigue, fever and unexpected weight change.   Respiratory:  Negative for cough and shortness of breath.    Cardiovascular:  Negative for chest pain, palpitations and leg swelling.   Gastrointestinal:  Negative for abdominal pain, constipation, diarrhea, nausea and vomiting.   Skin:  Negative for rash.   Neurological:  Negative for dizziness, syncope, weakness and headaches.         Objective:      Vitals:    09/13/24 0836   BP: (!) 124/52   Pulse: 74   SpO2: 96%   Weight: 94.5 kg (208 lb 5.4 oz)   Height: 5' 4" (1.626 m)   PainSc: 0-No pain       Body mass index is 35.76 kg/m².    Physical Exam  Vitals reviewed.   Constitutional:       Appearance: Normal appearance. She is well-developed.   HENT:      Head: Normocephalic and atraumatic.   Eyes:      Extraocular Movements: Extraocular movements intact.      Conjunctiva/sclera: Conjunctivae normal.      Pupils: Pupils are equal, round, and reactive to light.   Cardiovascular:      Rate and Rhythm: Normal rate and regular rhythm.      Pulses: Normal pulses.      Heart sounds: Normal heart sounds.   Pulmonary:      Effort: Pulmonary effort is normal.      Breath sounds: Normal breath sounds.   Abdominal:      General: Bowel sounds are normal.      Palpations: Abdomen is soft.   Musculoskeletal:         General: No swelling or tenderness.      Cervical back: Normal range of motion and neck supple.   Skin:     General: Skin is warm and dry.      Capillary Refill: Capillary refill takes less " than 2 seconds.      Nails: There is no clubbing.   Neurological:      General: No focal deficit present.      Mental Status: She is alert and oriented to person, place, and time.      Gait: Gait normal.   Psychiatric:         Speech: Speech normal.         Behavior: Behavior normal.         Thought Content: Thought content normal.         Assessment:       1. Vitamin D deficiency    2. Type 2 diabetes mellitus with diabetic polyneuropathy, unspecified whether long term insulin use    3. Thyroid cyst    4. Essential hypertension    5. B12 deficiency    6. Statin declined    7. Severe obesity (BMI 35.0-39.9) with comorbidity    8. Idiopathic ventricular tachycardia    9. Vadito cardiac risk >20% in next 10 years          Plan:         Vitamin D deficiency  -     Vitamin D; Future; Expected date: 09/13/2024  Stable   Cont suppl     Type 2 diabetes mellitus with diabetic polyneuropathy, unspecified whether long term insulin use  Stable   Cont regimen   F/u iwht endo     Thyroid cyst  Stable   Followed by endo     Essential hypertension  -     CBC Auto Differential; Future; Expected date: 09/13/2024  -     Comprehensive Metabolic Panel; Future; Expected date: 09/13/2024  Stable   Cont meds     B12 deficiency  -     Vitamin B12; Future; Expected date: 09/13/2024  Stable   Cont suppl     Statin declined  If reconsiders will let me or pcp know   I recommend the Mediterranean diet, a graded exercise program and maintaining a healthy weight to optimize cholesterol levels.    Severe obesity (BMI 35.0-39.9) with comorbidity  - cont diet and exercise  - increase intensity and duration of CV exercise to continue weight loss  - goal wt loss one pound per week  - portion control, healthy choices    Idiopathic ventricular tachycardia  Stable   F/uw ith cards     Vadito cardiac risk >20% in next 10 years  Declines statin     54 min spent in care of patient including chart review, history, orders, physical, orders and  coordination of care

## 2024-10-10 ENCOUNTER — PATIENT OUTREACH (OUTPATIENT)
Dept: ADMINISTRATIVE | Facility: HOSPITAL | Age: 74
End: 2024-10-10
Payer: MEDICARE

## 2024-10-10 DIAGNOSIS — Z12.31 ENCOUNTER FOR SCREENING MAMMOGRAM FOR BREAST CANCER: Primary | ICD-10-CM

## 2024-10-15 ENCOUNTER — PATIENT MESSAGE (OUTPATIENT)
Dept: INTERNAL MEDICINE | Facility: CLINIC | Age: 74
End: 2024-10-15
Payer: MEDICARE

## 2024-10-19 DIAGNOSIS — I10 ESSENTIAL HYPERTENSION: ICD-10-CM

## 2024-10-19 NOTE — TELEPHONE ENCOUNTER
No care due was identified.  [facility] Embedded Care Due Messages. Reference number: [id number].   10/19/2024 9:06:21 AM CDT

## 2024-10-20 RX ORDER — VALSARTAN 160 MG/1
160 TABLET ORAL
Qty: 90 TABLET | Refills: 1 | Status: SHIPPED | OUTPATIENT
Start: 2024-10-20

## 2024-10-20 NOTE — TELEPHONE ENCOUNTER
Refill Decision Note   Jennifer Motley  is requesting a refill authorization.  Brief Assessment and Rationale for Refill:  Approve     Medication Therapy Plan:        Comments:     Note composed:12:28 PM 10/20/2024

## 2024-11-19 ENCOUNTER — PATIENT OUTREACH (OUTPATIENT)
Dept: ADMINISTRATIVE | Facility: HOSPITAL | Age: 74
End: 2024-11-19
Payer: MEDICARE

## 2024-11-19 DIAGNOSIS — E11.42 TYPE 2 DIABETES MELLITUS WITH DIABETIC POLYNEUROPATHY, UNSPECIFIED WHETHER LONG TERM INSULIN USE: Primary | ICD-10-CM

## 2024-11-19 NOTE — PROGRESS NOTES
Health Maintenance reviewed, updated and links triggered. Foot Exam due Patient stated Endocrinology   Doctor checked her feet at last appointment Externally 6/2024. (fford) 11/19/24    Care Coordination Encounter Details:       MyChart Portal Status:         [x]  Reviewed MyChart Portal Status offered / enrolled if applicable        Additional Notes:     MyChart Outcomes: Pt is enrolled & active          Updates Requested / Reviewed:        Care Everywhere         Health Maintenance Screening(s) Due:      Health Maintenance Topics Overdue:      VBHM Score: 0     Patient is not due for any topics at this time.    Shingles/Zoster Vaccine                  Health Maintenance Topic(s) Outreach Outcomes & Actions Taken:    Lab(s) - Outreach Outcomes & Actions Taken  : Overdue Lab(s) Scheduled appt 11/20/24 A1c and Urine screening                     Additional Notes:  Health Maintenance reviewed, updated and links triggered. Foot Exam due Patient stated Endocrinology Doctor checked her feet at last appointment Externally 6/2024. (fford) 11/19/24           Chronic Disease Management:     Diabetes Measures        Lab Results   Component Value Date    HGBA1C 6.1 (H) 08/23/2023           [x]  Reviewed chart for active Diabetes diagnosis     [x]  Scheduled necessary follow up appointments if needed         Additional Notes:    A1c 11/20/24         Hypertension Measures        BP Readings from Last 1 Encounters:   09/13/24 (!) 124/52           [x]  Reviewed chart for active Hypertension diagnosis     [x]  Reviewed & documented Home BP Cuff     [x]  Documented a Remote BP if needed & applicable     [x]  Scheduled necessary follow up appointments with Primary Care if needed         Additional Notes:    WNL         Provider Team Continuity:     Last PCP Visit Date: 2/21/2024          [x]  Reviewed Primary Care Provider Visits, Annual Wellness Visit, and Future          Appointments to ensure appointments have been scheduled  and/or           completed        Additional Notes:             Social Determinants of Health          [x]  Reviewed, completed, and/or updated the following sections:                  Food Insecurity, Transportation Needs, Financial Resource Strain,                 Tobacco Use        Additional Notes:             Care Management, Digital Medicine, and/or Education Referrals    OPCM Risk Score: 16.4                 Additional Notes:

## 2024-11-20 ENCOUNTER — LAB VISIT (OUTPATIENT)
Dept: LAB | Facility: OTHER | Age: 74
End: 2024-11-20
Attending: STUDENT IN AN ORGANIZED HEALTH CARE EDUCATION/TRAINING PROGRAM
Payer: MEDICARE

## 2024-11-20 DIAGNOSIS — E11.42 TYPE 2 DIABETES MELLITUS WITH DIABETIC POLYNEUROPATHY, UNSPECIFIED WHETHER LONG TERM INSULIN USE: ICD-10-CM

## 2024-11-20 LAB
ALBUMIN/CREAT UR: 8.7 UG/MG (ref 0–30)
CREAT UR-MCNC: 103.1 MG/DL (ref 15–325)
MICROALBUMIN UR DL<=1MG/L-MCNC: 9 UG/ML

## 2024-11-20 PROCEDURE — 82570 ASSAY OF URINE CREATININE: CPT | Mod: HCNC | Performed by: STUDENT IN AN ORGANIZED HEALTH CARE EDUCATION/TRAINING PROGRAM

## 2024-12-09 ENCOUNTER — PATIENT OUTREACH (OUTPATIENT)
Dept: ADMINISTRATIVE | Facility: HOSPITAL | Age: 74
End: 2024-12-09
Payer: MEDICARE

## 2024-12-13 ENCOUNTER — PATIENT MESSAGE (OUTPATIENT)
Dept: INTERNAL MEDICINE | Facility: CLINIC | Age: 74
End: 2024-12-13
Payer: MEDICARE

## 2024-12-13 VITALS — DIASTOLIC BLOOD PRESSURE: 73 MMHG | SYSTOLIC BLOOD PRESSURE: 156 MMHG

## 2024-12-13 DIAGNOSIS — I10 ESSENTIAL HYPERTENSION: Primary | ICD-10-CM

## 2024-12-13 NOTE — TELEPHONE ENCOUNTER
Monday, 12/09 @7:48pm 133/54  Tuesday, 12/10 @ 3:38pm 166/78 and @ 10:37pm 156/68  Wednesday, 12/11 @ 11:16am 144/72 and @ 10:18pm 148/68  Thursday, 12/12 @ 6:28am 156/70  Friday,  12/13 @ 3:04pm 156/73.   Would you like for pt to come in for a nurse visit ?

## 2024-12-16 NOTE — TELEPHONE ENCOUNTER
Sent e-visit for HTN if she'd like to do the e-visit route. Or she can be scheduled for a virtual or in person appointment with myself or one of our ISRAEL's to review these readings. Please let patient know, thank you!

## 2024-12-19 RX ORDER — ROSUVASTATIN CALCIUM 5 MG/1
5 TABLET, COATED ORAL DAILY
COMMUNITY
Start: 2024-10-10

## 2024-12-20 ENCOUNTER — PATIENT OUTREACH (OUTPATIENT)
Dept: ADMINISTRATIVE | Facility: HOSPITAL | Age: 74
End: 2024-12-20
Payer: MEDICARE

## 2024-12-20 NOTE — PROGRESS NOTES
Health Maintenance reviewed, updated and links triggered. Foot Exam due Patient stated Endocrinology   Doctor checked her feet at last appointment Externally 6/2024. (fford) 11/19/24  Follow up to see if lab and urine completed note added to appt 12/23/24 with pcp feet check due (fford) 12/20/24

## 2024-12-23 ENCOUNTER — OFFICE VISIT (OUTPATIENT)
Dept: INTERNAL MEDICINE | Facility: CLINIC | Age: 74
End: 2024-12-23
Payer: MEDICARE

## 2024-12-23 ENCOUNTER — HOSPITAL ENCOUNTER (OUTPATIENT)
Dept: RADIOLOGY | Facility: OTHER | Age: 74
Discharge: HOME | End: 2024-12-23
Attending: STUDENT IN AN ORGANIZED HEALTH CARE EDUCATION/TRAINING PROGRAM
Payer: MEDICARE

## 2024-12-23 VITALS
SYSTOLIC BLOOD PRESSURE: 144 MMHG | HEIGHT: 64 IN | HEART RATE: 63 BPM | DIASTOLIC BLOOD PRESSURE: 73 MMHG | OXYGEN SATURATION: 97 % | BODY MASS INDEX: 35.23 KG/M2 | WEIGHT: 206.38 LBS

## 2024-12-23 DIAGNOSIS — E55.9 VITAMIN D DEFICIENCY: ICD-10-CM

## 2024-12-23 DIAGNOSIS — Z12.31 ENCOUNTER FOR SCREENING MAMMOGRAM FOR BREAST CANCER: ICD-10-CM

## 2024-12-23 DIAGNOSIS — E11.42 TYPE 2 DIABETES MELLITUS WITH DIABETIC POLYNEUROPATHY, UNSPECIFIED WHETHER LONG TERM INSULIN USE: ICD-10-CM

## 2024-12-23 DIAGNOSIS — E78.2 MIXED HYPERLIPIDEMIA: ICD-10-CM

## 2024-12-23 DIAGNOSIS — I10 ESSENTIAL HYPERTENSION: ICD-10-CM

## 2024-12-23 DIAGNOSIS — M85.80 OSTEOPENIA, UNSPECIFIED LOCATION: ICD-10-CM

## 2024-12-23 DIAGNOSIS — E53.8 VITAMIN B12 DEFICIENCY: ICD-10-CM

## 2024-12-23 DIAGNOSIS — I10 ESSENTIAL HYPERTENSION: Primary | ICD-10-CM

## 2024-12-23 DIAGNOSIS — E04.2 MULTINODULAR THYROID: ICD-10-CM

## 2024-12-23 DIAGNOSIS — Z00.00 HEALTH MAINTENANCE EXAMINATION: ICD-10-CM

## 2024-12-23 PROCEDURE — 3066F NEPHROPATHY DOC TX: CPT | Mod: HCNC,CPTII,S$GLB, | Performed by: STUDENT IN AN ORGANIZED HEALTH CARE EDUCATION/TRAINING PROGRAM

## 2024-12-23 PROCEDURE — 3077F SYST BP >= 140 MM HG: CPT | Mod: HCNC,CPTII,S$GLB, | Performed by: STUDENT IN AN ORGANIZED HEALTH CARE EDUCATION/TRAINING PROGRAM

## 2024-12-23 PROCEDURE — 4010F ACE/ARB THERAPY RXD/TAKEN: CPT | Mod: HCNC,CPTII,S$GLB, | Performed by: STUDENT IN AN ORGANIZED HEALTH CARE EDUCATION/TRAINING PROGRAM

## 2024-12-23 PROCEDURE — 1126F AMNT PAIN NOTED NONE PRSNT: CPT | Mod: HCNC,CPTII,S$GLB, | Performed by: STUDENT IN AN ORGANIZED HEALTH CARE EDUCATION/TRAINING PROGRAM

## 2024-12-23 PROCEDURE — 3078F DIAST BP <80 MM HG: CPT | Mod: HCNC,CPTII,S$GLB, | Performed by: STUDENT IN AN ORGANIZED HEALTH CARE EDUCATION/TRAINING PROGRAM

## 2024-12-23 PROCEDURE — 99214 OFFICE O/P EST MOD 30 MIN: CPT | Mod: HCNC,S$GLB,, | Performed by: STUDENT IN AN ORGANIZED HEALTH CARE EDUCATION/TRAINING PROGRAM

## 2024-12-23 PROCEDURE — 77063 BREAST TOMOSYNTHESIS BI: CPT | Mod: 26,HCNC,, | Performed by: RADIOLOGY

## 2024-12-23 PROCEDURE — G2211 COMPLEX E/M VISIT ADD ON: HCPCS | Mod: HCNC,S$GLB,, | Performed by: STUDENT IN AN ORGANIZED HEALTH CARE EDUCATION/TRAINING PROGRAM

## 2024-12-23 PROCEDURE — 3044F HG A1C LEVEL LT 7.0%: CPT | Mod: HCNC,CPTII,S$GLB, | Performed by: STUDENT IN AN ORGANIZED HEALTH CARE EDUCATION/TRAINING PROGRAM

## 2024-12-23 PROCEDURE — 77067 SCR MAMMO BI INCL CAD: CPT | Mod: 26,HCNC,, | Performed by: RADIOLOGY

## 2024-12-23 PROCEDURE — 3288F FALL RISK ASSESSMENT DOCD: CPT | Mod: HCNC,CPTII,S$GLB, | Performed by: STUDENT IN AN ORGANIZED HEALTH CARE EDUCATION/TRAINING PROGRAM

## 2024-12-23 PROCEDURE — 1101F PT FALLS ASSESS-DOCD LE1/YR: CPT | Mod: HCNC,CPTII,S$GLB, | Performed by: STUDENT IN AN ORGANIZED HEALTH CARE EDUCATION/TRAINING PROGRAM

## 2024-12-23 PROCEDURE — 3061F NEG MICROALBUMINURIA REV: CPT | Mod: HCNC,CPTII,S$GLB, | Performed by: STUDENT IN AN ORGANIZED HEALTH CARE EDUCATION/TRAINING PROGRAM

## 2024-12-23 PROCEDURE — 77067 SCR MAMMO BI INCL CAD: CPT | Mod: TC,HCNC

## 2024-12-23 PROCEDURE — 99999 PR PBB SHADOW E&M-EST. PATIENT-LVL IV: CPT | Mod: PBBFAC,HCNC,, | Performed by: STUDENT IN AN ORGANIZED HEALTH CARE EDUCATION/TRAINING PROGRAM

## 2024-12-23 PROCEDURE — 1159F MED LIST DOCD IN RCRD: CPT | Mod: HCNC,CPTII,S$GLB, | Performed by: STUDENT IN AN ORGANIZED HEALTH CARE EDUCATION/TRAINING PROGRAM

## 2024-12-23 PROCEDURE — 3008F BODY MASS INDEX DOCD: CPT | Mod: HCNC,CPTII,S$GLB, | Performed by: STUDENT IN AN ORGANIZED HEALTH CARE EDUCATION/TRAINING PROGRAM

## 2024-12-23 RX ORDER — VALSARTAN 160 MG/1
160 TABLET ORAL 2 TIMES DAILY
Qty: 180 TABLET | Refills: 1 | Status: SHIPPED | OUTPATIENT
Start: 2024-12-23

## 2024-12-23 NOTE — PROGRESS NOTES
Subjective:       Patient ID: Jennifer Motley is a 74 y.o. female.    Chief Complaint: Essential hypertension [I10]    Patient is established with me, here today for the following:    T2DM, HTN, osteopenia, vitamin D, glaucoma, vitamin B12    History of Present Illness      BLOOD PRESSURE:  She is currently taking Valsartan, Verapamil, and Hydrochlorothiazide for blood pressure management. She expresses hesitancy about increasing Valsartan dosage from 160mg to 320mg and reports experiencing frequent urination within the first hour of taking the diuretic medication.    MUSCULOSKELETAL:  She has a meniscus tear with ongoing pain. She received cortisone injections for treatment. She participates in water aerobics twice weekly.    MEDICATIONS:  She has not taken Rybelsus since October. She continues Crestor (Resuvastatin) 5 mg for cholesterol management.    PREVENTIVE CARE:  She is current with flu vaccine, COVID vaccinations, RSV vaccination, and pneumonia vaccination series. She reports having thyroid ultrasound every other year.      ROS:  Genitourinary: +frequency  Musculoskeletal: +joint pain        Health maintenance -   Cologuard negative JUNE2024.   Denies family history of colorectal cancer.  Mammogram BI-RADS 1 in DEC2023.   Denies family history of breast cancer.  Denies family history of ovarian cancer.  Completed pap screening.  Denies history of abnormal pap smear.  Family history of cardiac disease.  UTD on Tdap, COVID, PPSV23, PCV13, RSV, influneza vaccinations.  Due for shingles vaccinations.  Never smoker.  Denies drug use.  Completed hepatitis C screening.      HTN -   Currently prescribed valsartan, verapamil, HCTZ.  Lab Results   Component Value Date    MICALBCREAT 8.7 11/20/2024     BP Readings from Last 5 Encounters:   12/13/24 (!) 156/73   09/13/24 (!) 124/52   08/22/24 (!) 144/66   07/13/24 (!) 154/72   04/24/24 138/61      T2DM -   Currently taking metformin and Rybelsus 7 mg daily  Following  "with Dr. Estrada routinely for endocrinology.  UTD on foot exam.  UTD on eye exam.  Lab Results   Component Value Date    HGBA1C 6.4 (H) 11/20/2024    HGBA1C 6.1 (H) 08/23/2023    HGBA1C 6.0 (H) 05/25/2023     Lab Results   Component Value Date    MICALBCREAT 8.7 11/20/2024           HLD -   Endorses taking rosuvastatin as directed  Lab Results   Component Value Date    CHOL 162 08/23/2023     Lab Results   Component Value Date    TRIG 91 08/23/2023     Lab Results   Component Value Date    LDLCALC 90.8 08/23/2023     Lab Results   Component Value Date    HDL 53 08/23/2023   Due for lipid recheck.     Osteopenia -   Vitamin D deficiency -   Completed DEXA in OCT2023.  Showed normal bone density.  "Major osteoporotic fracture: 5.1%   Hip fracture: 0.3% "  Family history of osteoporosis.   Currently taking vitamin D3 daily.  Currently taking calcium daily.  Lab Results   Component Value Date    [id number] 44 09/13/2024    [id number] 48 08/23/2023    [id number] 46 11/16/2020      Vitamin B12 deficiency -  Currently taking B complex daily supplementation.  Lab Results   Component Value Date    [id number] 725 09/13/2024              Thyroid nodules -   Following with Dr. Estrada for endocrinology routinely.  Lab Results   Component Value Date    TSH 0.966 08/23/2023    TSH 0.847 11/16/2020    TSH 0.736 09/23/2019        Current Outpatient Medications   Medication Instructions    aspirin (ECOTRIN) 81 mg, Oral, Daily    B-complex with vitamin C (SUPER B COMPLEX-VITAMIN C) tablet 1 tablet, Oral, Daily    brimonidine 0.2% (ALPHAGAN) 0.2 % Drop 1 drop, Both Eyes, 3 times daily    calcium-vitamin D3 500 mg(1,250mg) -200 unit per tablet 1 tablet, Oral, Daily    COVID-19 (COMIRNATY 2024-25, 12Y UP,,PF,) 30 mcg/0.3 mL IM vaccine (>/= 13 yo) Intramuscular    cranberry 500 mg Cap 1 capsule, Oral, Daily    dorzolamide (TRUSOPT) 2 % ophthalmic solution 1 drop, Both Eyes, 3 times daily    FLAXSEED ORAL 1 tablet, Oral, Daily    " "hydroCHLOROthiazide (HYDRODIURIL) 25 mg, Oral    latanoprost 0.005 % ophthalmic solution 1 drop, Both Eyes, Nightly    metFORMIN (GLUCOPHAGE) 500 mg, Oral, With breakfast    multivitamin capsule 1 capsule, Oral, Daily    omega-3 fatty acids/fish oil (FISH OIL-OMEGA-3 FATTY ACIDS) 300-1,000 mg capsule Oral, Daily    rosuvastatin (CRESTOR) 5 mg, Daily    RYBELSUS 7 mg, Oral, Daily    semaglutide (RYBELSUS) 3 mg, Oral, Daily    triamcinolone acetonide 0.1% (KENALOG) 0.1 % cream Topical (Top), 2 times daily    turmeric root extract 500 mg Cap 1 each, Oral, Daily    valsartan (DIOVAN) 160 mg, Oral    verapamiL (CALAN-SR) 180 mg, Oral     Objective:      Vitals:    12/23/24 1033   BP: (!) 144/73   Pulse: 63   SpO2: 97%   Weight: 93.6 kg (206 lb 5.6 oz)   Height: 5' 4" (1.626 m)   PainSc: 0-No pain     Body mass index is 35.42 kg/m².    Physical Exam  Vitals reviewed.   Constitutional:       General: She is not in acute distress.     Appearance: Normal appearance. She is not ill-appearing or diaphoretic.   HENT:      Head: Normocephalic and atraumatic.      Right Ear: Tympanic membrane, ear canal and external ear normal. There is no impacted cerumen.      Left Ear: Tympanic membrane, ear canal and external ear normal. There is no impacted cerumen.      Nose: Nose normal. No rhinorrhea.      Mouth/Throat:      Mouth: Mucous membranes are moist.      Pharynx: Oropharynx is clear. No oropharyngeal exudate or posterior oropharyngeal erythema.   Eyes:      General: No scleral icterus.        Right eye: No discharge.         Left eye: No discharge.      Conjunctiva/sclera: Conjunctivae normal.   Neck:      Thyroid: No thyromegaly or thyroid tenderness.      Trachea: Trachea normal.   Cardiovascular:      Rate and Rhythm: Normal rate and regular rhythm.      Heart sounds: Normal heart sounds. No murmur heard.     No friction rub. No gallop.   Pulmonary:      Effort: Pulmonary effort is normal. No respiratory distress.      Breath " sounds: Normal breath sounds. No stridor. No wheezing, rhonchi or rales.   Abdominal:      General: There is no distension.      Palpations: Abdomen is soft.      Tenderness: There is no abdominal tenderness. There is no guarding or rebound.   Musculoskeletal:         General: No swelling or deformity.      Cervical back: Neck supple.   Lymphadenopathy:      Head:      Right side of head: No submandibular or posterior auricular adenopathy.      Left side of head: No submandibular or posterior auricular adenopathy.      Cervical: No cervical adenopathy.      Right cervical: No superficial, deep or posterior cervical adenopathy.     Left cervical: No superficial, deep or posterior cervical adenopathy.      Upper Body:      Right upper body: No supraclavicular adenopathy.      Left upper body: No supraclavicular adenopathy.   Skin:     General: Skin is warm and dry.   Neurological:      General: No focal deficit present.      Mental Status: She is alert. Mental status is at baseline.      Gait: Gait normal.   Psychiatric:         Mood and Affect: Mood normal.         Behavior: Behavior normal.         Assessment:       1. Essential hypertension    2. Type 2 diabetes mellitus with diabetic polyneuropathy, unspecified whether long term insulin use    3. Palmer cardiac risk >20% in next 10 years    4. Osteopenia, unspecified location    5. Vitamin D deficiency    6. Vitamin B12 deficiency    7. Multinodular thyroid    8. Health maintenance examination    9. Essential hypertension        Plan:   Essential hypertension  -     valsartan (DIOVAN) 160 MG tablet; Take 1 tablet (160 mg total) by mouth 2 (two) times daily.  Dispense: 180 tablet; Refill: 1    Type 2 diabetes mellitus with diabetic polyneuropathy, unspecified whether long term insulin use    Palmer cardiac risk >20% in next 10 years    Osteopenia, unspecified location    Vitamin D deficiency    Vitamin B12 deficiency    Multinodular thyroid    Health  maintenance examination    Essential hypertension  -     valsartan (DIOVAN) 160 MG tablet; Take 1 tablet (160 mg total) by mouth 2 (two) times daily.  Dispense: 180 tablet; Refill: 1    Assessment & Plan    IMPRESSION:  - Assessed blood pressure control, noting persistent elevations with recent home readings ranging 133-166/54-78 mmHg  - Considered increasing Valsartan dose from 160mg to 320mg daily to achieve better blood pressure control and reduce risk of end-organ damage  - Evaluated current cholesterol management with low-dose rosuvastatin (5mg)  - Reviewed weight management efforts, including prior use of oral semaglutide (Rybelsus)  - Assessed thyroid status, noting stable ultrasound findings.    ESSENTIAL HYPERTENSION:  - Emphasized the importance of blood pressure control in preventing long-term damage to blood vessels in the brain, heart, and kidneys.  - Discussed the concept of blood pressure as an asymptomatic condition despite causing chronic damage.  - Explained the limited benefit of increasing hydrochlorothiazide dosage beyond 25mg.  - Increased Valsartan from 160mg to 320mg daily, with option to split dose (160mg in morning, 160mg at night).  - Continued hydrochlorothiazide 25mg in the morning.  - Continued Verapamil at night.    MENISCUS DERANGEMENT:  - Jennifer to perform provided stretches and strengthening exercises for meniscus injury, especially before physical activity or 2 times daily if experiencing pain.  - Recommend considering chair exercises as an alternative to high-impact activities.    HYPERLIPIDEMIA:  - Continued rosuvastatin 5mg.    GENERAL HEALTH MANAGEMENT:  - Jennifer to continue water aerobics 2 times weekly as tolerated for exercise.  - Ordered CBC, lipid panel, thyroid function tests, A1C, and vitamin D level as routine labs.    FOLLOW-UP:  - Follow up with endocrinologist in January for diabetes management and to review lab results.  - Follow up after completing lab work at the  end of the month.           Betsy Warner MD  12/23/2024

## 2025-01-10 LAB
LEFT EYE DM RETINOPATHY: NORMAL
RIGHT EYE DM RETINOPATHY: NORMAL

## 2025-01-13 ENCOUNTER — PATIENT OUTREACH (OUTPATIENT)
Dept: ADMINISTRATIVE | Facility: HOSPITAL | Age: 75
End: 2025-01-13
Payer: MEDICARE

## 2025-01-16 ENCOUNTER — TELEPHONE (OUTPATIENT)
Dept: INTERNAL MEDICINE | Facility: CLINIC | Age: 75
End: 2025-01-16
Payer: MEDICARE

## 2025-01-21 ENCOUNTER — PATIENT OUTREACH (OUTPATIENT)
Dept: ADMINISTRATIVE | Facility: HOSPITAL | Age: 75
End: 2025-01-21
Payer: MEDICARE

## 2025-01-21 NOTE — PROGRESS NOTES
[facility], [place] reviewed, updated and links triggered. 'S up to date (fford) 1/21/25  Pcp visit on 3/13/25

## 2025-02-02 NOTE — PROGRESS NOTES
HPI     Glaucoma            Comments: HVF and OCT review and pt states no changes since last exam           Comments    DLS: 24    1. POAG OU  2. NS OU  3. Type 2 DM no DR    MEDS:  Brimonidine TID OU  Dorzolamide TID OU  Latanoprost QHS OU          Last edited by Supriya Simons MD on 2025 11:39 AM.            Assessment /Plan     For exam results, see Encounter Report.    Primary open angle glaucoma (POAG) of both eyes, mild stage  -     Pineda Visual Field - OU - Extended - Both Eyes  -     Posterior Segment OCT Optic Nerve- Both eyes    Cataract, nuclear sclerotic, both eyes    Type 2 diabetes mellitus without ophthalmic manifestations    Type 2 diabetes mellitus with diabetic polyneuropathy, unspecified whether long term insulin use  -     CBC Auto Differential        1. Patient followed by Dr. Balwinder Gregg for many years. - post Mary   Pre-Mary - was seeing dr Nadine Avila   Seen by Dr. Kebede in . Seen by Dr. Hensley (retina) for ? Mild armd  in . - taking AREDs but only 1 x day - is to F/U w/ her yearly        Glaucoma (type and duration)    Primary open angle glaucoma OU. Mild.    First HVF    (outside records - fullish)    First photos   10/2021    Treatment / Drops started   About            Family history    Mother  since . H/o glaucoma surgery/drops.         Glaucoma meds    Dorzolamide TID OU. // brimonidine tid ou  (simbrinza was too expensive) /  latanoprost  QHS OU.         H/O adverse rxn to glaucoma drops:   H/o bundle branch block. Taken off of beta blockers.         LASERS    None        GLAUCOMA SURGERIES    None        OTHER EYE SURGERIES    None        CDR    0.75/0.8        Tbase    ?? Off gtts           Tmax    - on gtts           Ttarget    18 ou - may need to be lower              HVF   - many old outside test - pt brought in - all near normal - Dr Balwinder Gregg     4 test  to   - SNS  / INS (+prog) od // near full os         Gonio    +3 ou         CCT    505/ 507         OCT    4 test 2022 to 2025 - RNFL - dec G/TI/NS , bord NI od (?prog od) // bord TI  os (stable os)         Disc photos    10/18/21 with Dr. Simons    - Ttoday    17/17    (target 18 ou)   - Test done today    IOP - HVF / DFE / OCT     2, NS ou - mild monitor     3. Diabetes    No BDR     Plan:   Continue dorzolamide TID OU and Travatan QHS OU.  Avoid BB's - heart block   Monitor HVF's / and IOP and OCT going forward - to monitor for progression   + SNS od // ? INS os     Would be difficult to do slt - pt squeezes a lot with gonio     Pt records were given back to her - they have not been scanned into Epic - (10/18/2021)    But the important data has been - reviewed and abstracted     2/12/2024   Above target ou   ? OCT and VF prog od // ? VF porog os   Rec trial of brimonidine / simbrinza (sample given) - Rx sent to [facility]   Cont latanoprost ou (consider rocklatan)   Consider SLT - difficult gonio - squeezes     Sees outside retina doctor - Brenda Azar MD- gabrielle of macula os - monitor    8/26/2024   IOP better with simbrinza - separate bottles 2/2 cost   Cont dorz tid   Cont brim tid   Cont lat q hs   ?? If pt is a candidate for rhopressa or rocklatan prn     2/4/2025  Saw Dr Krueger a few weeks ago - IOP 16 ou  IOP 17 ou today   + VF prog od   ? OCT prog od     Prefer a lower IOP if possible   Cosndier SLT vs adding another drop     Pt wants to try rocklatan - sample given    If tolerated can send a Rx in // if NOT tolerated can go back to latanoprost - she will send a message thru the portal     F/U 4 months - IOP / gonio / check to see if on rocklatan or latanoprsot

## 2025-02-04 ENCOUNTER — OFFICE VISIT (OUTPATIENT)
Dept: OPHTHALMOLOGY | Facility: CLINIC | Age: 75
End: 2025-02-04
Payer: MEDICARE

## 2025-02-04 ENCOUNTER — CLINICAL SUPPORT (OUTPATIENT)
Dept: OPHTHALMOLOGY | Facility: CLINIC | Age: 75
End: 2025-02-04
Payer: MEDICARE

## 2025-02-04 DIAGNOSIS — H25.13 CATARACT, NUCLEAR SCLEROTIC, BOTH EYES: ICD-10-CM

## 2025-02-04 DIAGNOSIS — E11.42 TYPE 2 DIABETES MELLITUS WITH DIABETIC POLYNEUROPATHY, UNSPECIFIED WHETHER LONG TERM INSULIN USE: ICD-10-CM

## 2025-02-04 DIAGNOSIS — H40.1131 PRIMARY OPEN ANGLE GLAUCOMA (POAG) OF BOTH EYES, MILD STAGE: Primary | ICD-10-CM

## 2025-02-04 DIAGNOSIS — E11.9 TYPE 2 DIABETES MELLITUS WITHOUT OPHTHALMIC MANIFESTATIONS: ICD-10-CM

## 2025-02-04 PROCEDURE — 99999 PR PBB SHADOW E&M-EST. PATIENT-LVL III: CPT | Mod: PBBFAC,HCNC,, | Performed by: OPHTHALMOLOGY

## 2025-02-04 PROCEDURE — 1160F RVW MEDS BY RX/DR IN RCRD: CPT | Mod: HCNC,CPTII,S$GLB, | Performed by: OPHTHALMOLOGY

## 2025-02-04 PROCEDURE — 92083 EXTENDED VISUAL FIELD XM: CPT | Mod: HCNC,S$GLB,, | Performed by: OPHTHALMOLOGY

## 2025-02-04 PROCEDURE — 1126F AMNT PAIN NOTED NONE PRSNT: CPT | Mod: HCNC,CPTII,S$GLB, | Performed by: OPHTHALMOLOGY

## 2025-02-04 PROCEDURE — 1159F MED LIST DOCD IN RCRD: CPT | Mod: HCNC,CPTII,S$GLB, | Performed by: OPHTHALMOLOGY

## 2025-02-04 PROCEDURE — 1101F PT FALLS ASSESS-DOCD LE1/YR: CPT | Mod: HCNC,CPTII,S$GLB, | Performed by: OPHTHALMOLOGY

## 2025-02-04 PROCEDURE — 99214 OFFICE O/P EST MOD 30 MIN: CPT | Mod: HCNC,S$GLB,, | Performed by: OPHTHALMOLOGY

## 2025-02-04 PROCEDURE — 3288F FALL RISK ASSESSMENT DOCD: CPT | Mod: HCNC,CPTII,S$GLB, | Performed by: OPHTHALMOLOGY

## 2025-02-04 PROCEDURE — 92133 CPTRZD OPH DX IMG PST SGM ON: CPT | Mod: HCNC,S$GLB,, | Performed by: OPHTHALMOLOGY

## 2025-02-05 NOTE — PROGRESS NOTES
OCT/HVF done ou./ rel/fix/coop. Good ou./ chart checked for latex allergy./ -1.00/od -1.25 /os-smh

## 2025-02-17 ENCOUNTER — PATIENT MESSAGE (OUTPATIENT)
Dept: OPHTHALMOLOGY | Facility: CLINIC | Age: 75
End: 2025-02-17
Payer: MEDICARE

## 2025-02-17 RX ORDER — VERAPAMIL HYDROCHLORIDE 180 MG/1
180 TABLET, EXTENDED RELEASE ORAL
Qty: 90 TABLET | Refills: 3 | Status: SHIPPED | OUTPATIENT
Start: 2025-02-17

## 2025-02-24 DIAGNOSIS — Z00.00 ENCOUNTER FOR MEDICARE ANNUAL WELLNESS EXAM: ICD-10-CM

## 2025-02-24 DIAGNOSIS — H40.1131 PRIMARY OPEN ANGLE GLAUCOMA (POAG) OF BOTH EYES, MILD STAGE: Primary | ICD-10-CM

## 2025-02-24 RX ORDER — NETARSUDIL AND LATANOPROST OPHTHALMIC SOLUTION, 0.02%/0.005% .2; .05 MG/ML; MG/ML
1 SOLUTION/ DROPS OPHTHALMIC; TOPICAL NIGHTLY
Qty: 7.5 ML | Refills: 3 | Status: SHIPPED | OUTPATIENT
Start: 2025-02-24

## 2025-02-24 RX ORDER — NETARSUDIL AND LATANOPROST OPHTHALMIC SOLUTION, 0.02%/0.005% .2; .05 MG/ML; MG/ML
1 SOLUTION/ DROPS OPHTHALMIC; TOPICAL NIGHTLY
COMMUNITY
Start: 2025-02-24 | End: 2025-02-24 | Stop reason: SDUPTHER

## 2025-02-28 ENCOUNTER — PATIENT MESSAGE (OUTPATIENT)
Dept: OPHTHALMOLOGY | Facility: CLINIC | Age: 75
End: 2025-02-28
Payer: MEDICARE

## 2025-03-03 RX ORDER — LATANOPROST 50 UG/ML
1 SOLUTION/ DROPS OPHTHALMIC NIGHTLY
COMMUNITY
Start: 2025-03-03

## 2025-03-13 ENCOUNTER — OFFICE VISIT (OUTPATIENT)
Dept: INTERNAL MEDICINE | Facility: CLINIC | Age: 75
End: 2025-03-13
Payer: MEDICARE

## 2025-03-13 VITALS
HEART RATE: 75 BPM | SYSTOLIC BLOOD PRESSURE: 136 MMHG | WEIGHT: 205.5 LBS | BODY MASS INDEX: 35.08 KG/M2 | DIASTOLIC BLOOD PRESSURE: 70 MMHG | OXYGEN SATURATION: 99 % | HEIGHT: 64 IN

## 2025-03-13 DIAGNOSIS — E53.8 B12 DEFICIENCY: ICD-10-CM

## 2025-03-13 DIAGNOSIS — E78.2 MIXED HYPERLIPIDEMIA: ICD-10-CM

## 2025-03-13 DIAGNOSIS — I10 ESSENTIAL HYPERTENSION: Primary | ICD-10-CM

## 2025-03-13 DIAGNOSIS — E55.9 VITAMIN D DEFICIENCY: ICD-10-CM

## 2025-03-13 DIAGNOSIS — E11.42 TYPE 2 DIABETES MELLITUS WITH DIABETIC POLYNEUROPATHY, UNSPECIFIED WHETHER LONG TERM INSULIN USE: ICD-10-CM

## 2025-03-13 DIAGNOSIS — E04.2 MULTINODULAR THYROID: ICD-10-CM

## 2025-03-13 PROCEDURE — 3008F BODY MASS INDEX DOCD: CPT | Mod: HCNC,CPTII,S$GLB, | Performed by: STUDENT IN AN ORGANIZED HEALTH CARE EDUCATION/TRAINING PROGRAM

## 2025-03-13 PROCEDURE — 99999 PR PBB SHADOW E&M-EST. PATIENT-LVL III: CPT | Mod: PBBFAC,HCNC,, | Performed by: STUDENT IN AN ORGANIZED HEALTH CARE EDUCATION/TRAINING PROGRAM

## 2025-03-13 PROCEDURE — 3075F SYST BP GE 130 - 139MM HG: CPT | Mod: HCNC,CPTII,S$GLB, | Performed by: STUDENT IN AN ORGANIZED HEALTH CARE EDUCATION/TRAINING PROGRAM

## 2025-03-13 PROCEDURE — 1101F PT FALLS ASSESS-DOCD LE1/YR: CPT | Mod: HCNC,CPTII,S$GLB, | Performed by: STUDENT IN AN ORGANIZED HEALTH CARE EDUCATION/TRAINING PROGRAM

## 2025-03-13 PROCEDURE — 4010F ACE/ARB THERAPY RXD/TAKEN: CPT | Mod: HCNC,CPTII,S$GLB, | Performed by: STUDENT IN AN ORGANIZED HEALTH CARE EDUCATION/TRAINING PROGRAM

## 2025-03-13 PROCEDURE — 3078F DIAST BP <80 MM HG: CPT | Mod: HCNC,CPTII,S$GLB, | Performed by: STUDENT IN AN ORGANIZED HEALTH CARE EDUCATION/TRAINING PROGRAM

## 2025-03-13 PROCEDURE — 1126F AMNT PAIN NOTED NONE PRSNT: CPT | Mod: HCNC,CPTII,S$GLB, | Performed by: STUDENT IN AN ORGANIZED HEALTH CARE EDUCATION/TRAINING PROGRAM

## 2025-03-13 PROCEDURE — 99215 OFFICE O/P EST HI 40 MIN: CPT | Mod: HCNC,S$GLB,, | Performed by: STUDENT IN AN ORGANIZED HEALTH CARE EDUCATION/TRAINING PROGRAM

## 2025-03-13 PROCEDURE — G2211 COMPLEX E/M VISIT ADD ON: HCPCS | Mod: HCNC,S$GLB,, | Performed by: STUDENT IN AN ORGANIZED HEALTH CARE EDUCATION/TRAINING PROGRAM

## 2025-03-13 PROCEDURE — 3288F FALL RISK ASSESSMENT DOCD: CPT | Mod: HCNC,CPTII,S$GLB, | Performed by: STUDENT IN AN ORGANIZED HEALTH CARE EDUCATION/TRAINING PROGRAM

## 2025-03-13 NOTE — PROGRESS NOTES
Subjective:       Patient ID: Jennifer Motley is a 74 y.o. female.    Chief Complaint: Essential hypertension [I10]    Patient is established with me, here today for the following:    T2DM, HTN, osteopenia, vitamin D, glaucoma, vitamin B12     History of Present Illness      HYPERTENSION:  She occasionally misses blood pressure medications when filling pill box. She reports increased urination with additional valsartan dose.    SLEEP:  She reports irregular sleep pattern, staying up until 11:30 PM and waking around 4:00 AM with difficulty returning to sleep after bathroom visits. Others have reported snoring, though she is unaware. A previous sleep study was conducted in a hospital setting prior to [place].    OCULAR HYPERTENSION:  She was diagnosed with ocular hypertension, suggested to be equivalent to early-stage glaucoma by her OBGYN. Eye pressure readings have improved from 21 and 19 to 17 and 16 after changing eye drops. She reports significant morning redness and itchiness as side effects from the new eye drops, particularly after nighttime application. Clear Eyes drops provided minimal improvement of symptoms. Vision remains unchanged from previous year without need for new corrective lenses.    MUSCULOSKELETAL:  She reports morning hand stiffness with difficulty making a fist upon awakening and experiences snapping of the first joint during the day without associated pain. Hot water application provides symptom relief.    HEADACHES:  She experiences occasional stress-related headaches, sometimes unilateral. She notes possible association with dehydration, particularly during late night hours (around 3 a.m.) when she feels the need to drink water. She denies dizziness.      ROS:  Eyes: +redness, +eye itchiness  Cardiovascular: -chest pain  Respiratory: +snoring  Genitourinary: +excessive urination  Musculoskeletal: +joint stiffness, +muscle stiffness, +involuntary movements  Neurological: +headache,  "-dizziness, +sleep disturbances, +difficulty staying asleep        Health maintenance -   Cologuard negative JUNE2024.   Denies family history of colorectal cancer.  Mammogram BI-RADS 1 in DEC2024.   Denies family history of breast cancer.  Denies family history of ovarian cancer.  Completed pap screening.  Denies history of abnormal pap smear.  Family history of cardiac disease.  UTD on Tdap, COVID, PPSV23, PCV13, RSV, influenza vaccinations.  Due for shingles vaccinations.  Never smoker.  Denies drug use.  Completed hepatitis C screening.      HTN -   Currently prescribed valsartan, verapamil, HCTZ.  Lab Results   Component Value Date    MICALBCREAT 8.7 11/20/2024     BP Readings from Last 5 Encounters:   12/23/24 (!) 144/73   12/13/24 (!) 156/73   09/13/24 (!) 124/52   08/22/24 (!) 144/66   07/13/24 (!) 154/72      T2DM -   Currently taking metformin and Rybelsus 7 mg daily  Following with Dr. Estrada routinely for endocrinology.  Lab Results   Component Value Date    HGBA1C 6.4 (H) 11/20/2024    HGBA1C 6.1 (H) 08/23/2023    HGBA1C 6.0 (H) 05/25/2023     Lab Results   Component Value Date    MICALBCREAT 8.7 11/20/2024           HLD -   Currently prescribed: rosuvastatin  Lab Results   Component Value Date    CHOL 162 08/23/2023     Lab Results   Component Value Date    TRIG 91 08/23/2023     Lab Results   Component Value Date    LDLCALC 90.8 08/23/2023     Lab Results   Component Value Date    HDL 53 08/23/2023      Osteopenia -   Vitamin D deficiency -   Completed DEXA in OCT2023.  Showed normal bone density.  "Major osteoporotic fracture: 5.1%   Hip fracture: 0.3% "  Family history of osteoporosis.   Currently taking vitamin D3 daily.  Currently taking calcium daily.  Lab Results   Component Value Date    WCHZWDTG83MW 44 09/13/2024    VWHPDDQQ57JJ 48 08/23/2023    ZUXPJIUT73QA 46 11/16/2020     Vitamin B12 deficiency -  Currently taking B complex daily supplementation.  Lab Results   Component Value Date    " "[id number] 725 09/13/2024     Thyroid nodules -   Following with Dr. Estrada for endocrinology routinely.  Last thyroid ultrasound was JULY2023. Showed "Within the superficial subcutaneous tissues is in oval isoechoic focus measuring 2.1 x 2.6 x 1.2 cm. No appreciable internal vascularity. Considerations include a prominent fat lobule versus lipoma. Repeat imaging recommended should the abnormality enlarge. "  Repeat thyroid u/s JULY2025  Lab Results   Component Value Date    TSH 0.966 08/23/2023    TSH 0.847 11/16/2020    TSH 0.736 09/23/2019     Current Outpatient Medications   Medication Instructions    aspirin (ECOTRIN) 81 mg, Oral, Daily    B-complex with vitamin C (SUPER B COMPLEX-VITAMIN C) tablet 1 tablet, Oral, Daily    brimonidine 0.2% (ALPHAGAN) 0.2 % Drop 1 drop, Both Eyes, 3 times daily    calcium-vitamin D3 500 mg(1,250mg) -200 unit per tablet 1 tablet, Daily    cranberry 500 mg Cap 1 capsule, Oral, Daily    dorzolamide (TRUSOPT) 2 % ophthalmic solution 1 drop, Both Eyes, 3 times daily    FLAXSEED ORAL 1 tablet, Daily    hydroCHLOROthiazide (HYDRODIURIL) 25 mg, Oral    latanoprost 0.005 % ophthalmic solution 1 drop, Nightly    metFORMIN (GLUCOPHAGE) 500 mg, Oral, With breakfast    multivitamin capsule 1 capsule, Daily    omega-3 fatty acids/fish oil (FISH OIL-OMEGA-3 FATTY ACIDS) 300-1,000 mg capsule Daily    rosuvastatin (CRESTOR) 5 mg, Daily    RYBELSUS 7 mg, Daily    turmeric root extract 500 mg Cap 1 each, Oral, Daily    valsartan (DIOVAN) 160 mg, Oral, 2 times daily    verapamiL (CALAN-SR) 180 mg, Oral     Objective:      Vitals:    03/13/25 0942   BP: 136/70   Pulse: 75   SpO2: 99%   Weight: 93.2 kg (205 lb 7.5 oz)   Height: 5' 4" (1.626 m)   PainSc: 0-No pain     Body mass index is 35.27 kg/m².    Physical Exam  Vitals reviewed.   Constitutional:       General: She is not in acute distress.     Appearance: Normal appearance. She is not ill-appearing or diaphoretic.   HENT:      Head: " Normocephalic and atraumatic.      Right Ear: Tympanic membrane, ear canal and external ear normal. There is no impacted cerumen.      Left Ear: Tympanic membrane, ear canal and external ear normal. There is no impacted cerumen.      Nose: Nose normal. No rhinorrhea.      Mouth/Throat:      Mouth: Mucous membranes are moist.      Pharynx: Oropharynx is clear. No oropharyngeal exudate or posterior oropharyngeal erythema.   Eyes:      General: No scleral icterus.        Right eye: No discharge.         Left eye: No discharge.      Conjunctiva/sclera: Conjunctivae normal.   Neck:      Thyroid: No thyromegaly or thyroid tenderness.      Trachea: Trachea normal.   Cardiovascular:      Rate and Rhythm: Normal rate and regular rhythm.      Heart sounds: Normal heart sounds. No murmur heard.     No friction rub. No gallop.   Pulmonary:      Effort: Pulmonary effort is normal. No respiratory distress.      Breath sounds: Normal breath sounds. No stridor. No wheezing, rhonchi or rales.   Abdominal:      General: There is no distension.      Palpations: Abdomen is soft.      Tenderness: There is no abdominal tenderness. There is no guarding or rebound.   Musculoskeletal:         General: No swelling or deformity.      Cervical back: Neck supple.   Lymphadenopathy:      Head:      Right side of head: No submandibular or posterior auricular adenopathy.      Left side of head: No submandibular or posterior auricular adenopathy.      Cervical: No cervical adenopathy.      Right cervical: No superficial, deep or posterior cervical adenopathy.     Left cervical: No superficial, deep or posterior cervical adenopathy.      Upper Body:      Right upper body: No supraclavicular adenopathy.      Left upper body: No supraclavicular adenopathy.   Skin:     General: Skin is warm and dry.   Neurological:      General: No focal deficit present.      Mental Status: She is alert. Mental status is at baseline.      Gait: Gait normal.    Psychiatric:         Mood and Affect: Mood normal.         Behavior: Behavior normal.         Assessment:       1. Essential hypertension    2. Mixed hyperlipidemia    3. Multinodular thyroid    4. Type 2 diabetes mellitus with diabetic polyneuropathy, unspecified whether long term insulin use    5. B12 deficiency    6. Vitamin D deficiency        Plan:   Essential hypertension  -     Ambulatory referral/consult to Sleep Disorders; Future; Expected date: 03/20/2025    Mixed hyperlipidemia  -     Comprehensive Metabolic Panel; Future; Expected date: 03/13/2025  -     Lipid Panel; Future; Expected date: 03/13/2025    Multinodular thyroid  -     TSH; Future; Expected date: 03/13/2025    Type 2 diabetes mellitus with diabetic polyneuropathy, unspecified whether long term insulin use  -     Hemoglobin A1C; Future; Expected date: 03/13/2025  -     CBC Auto Differential; Future; Expected date: 03/13/2025    B12 deficiency  -     Vitamin B12; Future; Expected date: 03/13/2025    Vitamin D deficiency  -     Vitamin D; Future; Expected date: 03/13/2025      Assessment & Plan    IMPRESSION:  - Assessed blood pressure control; noted improvement but still slightly elevated  - Considered patient's age (under 75) in determining optimal blood pressure target  - Evaluated hand symptoms; suspected possible arthritis and trigger finger  - Reviewed thyroid ultrasound schedule; due in July 2025  - Considered bone density screening; currently not due based on last normal result  - Assessed sleep patterns and potential sleep apnea; recommended updated sleep study  - Evaluated ocular hypertension management; noted current eye drops causing erythema    SEVERE OBESITY (BMI 35.0-39.9) WITH COMORBIDITY:  - Noted patient's recent weight loss of 1 pound, totaling 3 lbs since September.  - Acknowledged progress and encouraged continued efforts.  - Increased Rybelsus dosage from 3mg to 7mg for weight management, anticipating more effect from the  higher dose.  - Awaiting approval for reduced medication cost from $300 to $59.  - Discussed potential use of alternative weight loss medications (Ozempic, Wegovy, Zepbound, and Mounjaro), noting patient's concerns about side effects.    ESSENTIAL HYPERTENSION:  - Continued Valsartan for blood pressure management, with a recent dosage increase.  - Emphasized the importance of maintaining blood pressure below 140 mmHg and consistent medication adherence.  - Noted improvement in blood pressure, currently in the 130s, still slightly high.  - Discussed target blood pressure range of 100-120/60-80.  - Explained risks associated with both high and low blood pressure.  - Noted patient occasionally misses medication doses, but not due to financial hardship.    OBSTRUCTIVE SLEEP APNEA:  - Discussed the link between sleep apnea and increased risks of heart disease and stroke.  - Noted patient reports waking up during the night, difficulty falling back asleep, and acknowledged reports of snoring from others.    IRREGULAR SLEEP-WAKE PATTERN:  - Noted patient reports irregular sleep patterns, including waking up early and difficulty falling back asleep.    TRIGGER FINGER:  - Examined the hand and confirmed the diagnosis of trigger finger.  - Provided information on potential treatments like steroid injections and surgery if needed.  - Advised patient to use hot water and movement to help with morning hand stiffness.  - Recommend monitoring the condition and considering steroid injection if it worsens.  - Instructed patient to contact the office if hand symptoms worsen or trigger finger becomes more frequent.    TENSION-TYPE HEADACHE:  - Noted patient reports occasional headaches, possibly related to stress or dehydration.  - Advised patient to check blood pressure when experiencing headaches.    TYPE 2 DIABETES MELLITUS:  - Noted upcoming appointment with endocrinologist on April 17th.  - Planned to order lab work to coincide  with endocrinologist's tests, including A1C and cholesterol.  - Jennifer to maintain current exercise routine with line dancing.    FOLLOW-UP AND ADDITIONAL TESTS:  - Scheduled follow-up visit for next week.  - Ordered cholesterol lab test to be done at [facility] and urinalysis to check for potential infection or other issues.  - Follow up in 6 months.         44 minutes were spent in chart review, documentation and review of results, and evaluation, treatment, and counseling of patient on the same day of service.    Betsy Warner MD  3/13/2025

## 2025-04-01 ENCOUNTER — OFFICE VISIT (OUTPATIENT)
Dept: INTERNAL MEDICINE | Facility: CLINIC | Age: 75
End: 2025-04-01
Payer: MEDICARE

## 2025-04-01 VITALS
DIASTOLIC BLOOD PRESSURE: 63 MMHG | OXYGEN SATURATION: 98 % | BODY MASS INDEX: 35.68 KG/M2 | HEIGHT: 64 IN | HEART RATE: 60 BPM | SYSTOLIC BLOOD PRESSURE: 137 MMHG | TEMPERATURE: 98 F | WEIGHT: 209 LBS

## 2025-04-01 DIAGNOSIS — Z80.41 FAMILY HISTORY OF OVARIAN CANCER: ICD-10-CM

## 2025-04-01 DIAGNOSIS — I10 ESSENTIAL HYPERTENSION: ICD-10-CM

## 2025-04-01 DIAGNOSIS — E55.9 VITAMIN D DEFICIENCY: ICD-10-CM

## 2025-04-01 DIAGNOSIS — I47.20 VENTRICULAR TACHYCARDIA: ICD-10-CM

## 2025-04-01 DIAGNOSIS — E11.42 TYPE 2 DIABETES MELLITUS WITH DIABETIC POLYNEUROPATHY, UNSPECIFIED WHETHER LONG TERM INSULIN USE: ICD-10-CM

## 2025-04-01 DIAGNOSIS — E53.8 B12 DEFICIENCY: ICD-10-CM

## 2025-04-01 DIAGNOSIS — Z00.00 ENCOUNTER FOR MEDICARE ANNUAL WELLNESS EXAM: Primary | ICD-10-CM

## 2025-04-01 DIAGNOSIS — E04.1 THYROID CYST: ICD-10-CM

## 2025-04-01 DIAGNOSIS — E66.01 SEVERE OBESITY (BMI 35.0-39.9) WITH COMORBIDITY: ICD-10-CM

## 2025-04-01 DIAGNOSIS — M85.80 OSTEOPENIA, UNSPECIFIED LOCATION: ICD-10-CM

## 2025-04-01 DIAGNOSIS — R01.1 NEWLY RECOGNIZED HEART MURMUR: ICD-10-CM

## 2025-04-01 DIAGNOSIS — Z53.20 STATIN DECLINED: ICD-10-CM

## 2025-04-01 DIAGNOSIS — Z91.89 FRAMINGHAM CARDIAC RISK >20% IN NEXT 10 YEARS: ICD-10-CM

## 2025-04-01 DIAGNOSIS — I47.29 IDIOPATHIC VENTRICULAR TACHYCARDIA: Chronic | ICD-10-CM

## 2025-04-01 DIAGNOSIS — N32.81 OVERACTIVE BLADDER: ICD-10-CM

## 2025-04-01 DIAGNOSIS — H40.059 OCULAR HYPERTENSION, UNSPECIFIED LATERALITY: ICD-10-CM

## 2025-04-01 PROCEDURE — 99999 PR PBB SHADOW E&M-EST. PATIENT-LVL V: CPT | Mod: PBBFAC,HCNC,, | Performed by: NURSE PRACTITIONER

## 2025-04-01 NOTE — PATIENT INSTRUCTIONS
Counseling and Referral of Other Preventative  (Italic type indicates deductible and co-insurance are waived)    Patient Name: Jennifer Motley  Today's Date: 4/1/2025    Health Maintenance       Date Due Completion Date    Shingles Vaccine (1 of 2) Never done ---    Hemoglobin A1c 05/20/2025 11/20/2024    Override on 5/8/2018: Done    Foot Exam 06/12/2025 6/12/2024 (Done)    Override on 6/12/2024: Done (Patient stated Endo doctor checked her feet at visit)    Override on 7/25/2019: Done    Override on 5/22/2018: Done    Lipid Panel 09/25/2025 9/25/2024    Override on 9/16/2021: Done    Override on 4/20/2018: Done    Override on 10/1/2016: Done    Diabetes Urine Screening 11/20/2025 11/20/2024    Mammogram 12/23/2025 12/23/2024    Override on 12/28/2020: Done    Override on 10/17/2019: Done    Diabetic Eye Exam 02/04/2026 2/4/2025    Override on 11/13/2020: Done    Override on 4/23/2018: Done    DEXA Scan 10/18/2026 10/18/2023    Override on 6/1/2015: Done    Colorectal Cancer Screening 06/24/2027 6/24/2024    TETANUS VACCINE 10/11/2030 10/11/2020        No orders of the defined types were placed in this encounter.    The following information is provided to all patients.  This information is to help you find resources for any of the problems found today that may be affecting your health:                  Living healthy guide: www.[facility].[place].gov      Understanding Diabetes: www.diabetes.org      Eating healthy: www.cdc.gov/healthyweight      CDC home safety checklist: www.cdc.gov/steadi/patient.html      Agency on Aging: www.goea.[place].gov      Alcoholics anonymous (AA): www.aa.org      Physical Activity: www.[place].nih.gov/ei1plov      Tobacco use: www.quitwithusla.org

## 2025-04-01 NOTE — PROGRESS NOTES
"  Jennifer Motley presented for an initial Medicare AWV today. The following components were reviewed and updated:    Medical history  Family History  Social history  Allergies and Current Medications  Health Risk Assessment  Health Maintenance  Care Team    **See Completed Assessments for Annual Wellness visit with in the encounter summary    The following assessments were completed:  Depression Screening  Cognitive function Screening  Timed Get Up Test  Whisper Test      Opioid documentation:      Patient does not have a current opioid prescription.            Vitals:    04/01/25 1017   BP: 137/63   BP Location: Left arm   Patient Position: Sitting   Pulse: 60   Temp: 97.9 °F (36.6 °C)   TempSrc: Oral   SpO2: 98%   Weight: 94.8 kg (208 lb 15.9 oz)   Height: 5' 4" (1.626 m)     Body mass index is 35.87 kg/m².       Physical Exam  Constitutional:       Appearance: Normal appearance. She is obese.   Neurological:      Mental Status: She is alert.   Psychiatric:         Mood and Affect: Mood normal.           Diagnoses and health risks identified today and associated recommendations/orders:  1. Encounter for Medicare annual wellness exam  Annual Health Risk Assessment (HRA) visit today.  Counseling and referral of health maintenance and preventative health measures performed.  Patient given annual wellness paperwork to take home.  Encouraged to return in 1 year for subsequent HRA visit.   - Referral to Enhanced Annual Wellness Visit (eAWV) W+1    2. Severe obesity (BMI 35.0-39.9) with comorbidity  Chronic. Stable. Encouraged to increase exercise as tolerated and improve diet to heart healthy, low sodium diet. Continue current treatment plan as previously prescribed by PCP.    3. Vitamin D deficiency  Chronic. Stable. Continue current treatment plan as previously prescribed by PCP.    4. Family history of ovarian cancer  Chronic. Stable. Continue current treatment plan as previously prescribed by PCP.    5. Overactive " bladder  Chronic. Stable. Continue current treatment plan as previously prescribed by PCP.    6. Essential hypertension  Chronic. Stable. Controlled. Encouraged to increase exercise as tolerated (moderate-intensity aerobic activity and muscle-strengthening activities) improve diet to heart healthy, low sodium diet. Continue current treatment plan as previously prescribed by PCP.    7. [place] cardiac risk >20% in next 10 years  Chronic. Stable. Continue current treatment plan as previously prescribed by PCP.    8. Idiopathic ventricular tachycardia  Chronic. Stable. Continue current treatment plan as previously prescribed by PCP.    9. Newly recognized heart murmur  Chronic. Stable. Continue current treatment plan as previously prescribed by PCP.    10. Statin declined  Chronic. Stable. Continue current treatment plan as previously prescribed by PCP.    11. Ventricular tachycardia  Chronic. Stable. Continue current treatment plan as previously prescribed by PCP.    12. Ocular hypertension, unspecified laterality  Chronic. Stable. Continue current treatment plan as previously prescribed by PCP.    13. Thyroid cyst  Chronic. Stable. Continue current treatment plan as previously prescribed by PCP.    14. B12 deficiency  Chronic. Stable. Continue current treatment plan as previously prescribed by PCP.    15. Osteopenia, unspecified location  Chronic. Stable. Continue current treatment plan as previously prescribed by PCP.    16. Type 2 diabetes mellitus with diabetic polyneuropathy, unspecified whether long term insulin use  Chronic. Stable. Controlled. Last Hgb A1c=6.4 from 12/30/24. Continue current treatment plan as previously prescribed by PCP.      Provided Jennifer with a 5-10 year written screening schedule and personal prevention plan. Recommendations were developed using the USPSTF age appropriate recommendations. Education, counseling, and referrals were provided as needed.  After Visit Summary printed and  given to patient which includes a list of additional screenings\tests needed.    Follow up in about 1 year (around 4/1/2026).      Mark Rivers NP

## 2025-04-08 DIAGNOSIS — H40.1131 PRIMARY OPEN ANGLE GLAUCOMA (POAG) OF BOTH EYES, MILD STAGE: ICD-10-CM

## 2025-04-08 RX ORDER — DORZOLAMIDE HCL 20 MG/ML
1 SOLUTION/ DROPS OPHTHALMIC 3 TIMES DAILY
Qty: 30 ML | Refills: 3 | Status: SHIPPED | OUTPATIENT
Start: 2025-04-08

## 2025-04-23 ENCOUNTER — PATIENT OUTREACH (OUTPATIENT)
Dept: ADMINISTRATIVE | Facility: HOSPITAL | Age: 75
End: 2025-04-23
Payer: MEDICARE

## 2025-05-06 DIAGNOSIS — H40.1131 PRIMARY OPEN ANGLE GLAUCOMA (POAG) OF BOTH EYES, MILD STAGE: ICD-10-CM

## 2025-05-06 RX ORDER — BRIMONIDINE TARTRATE 2 MG/ML
1 SOLUTION/ DROPS OPHTHALMIC 3 TIMES DAILY
Qty: 30 ML | Refills: 3 | Status: SHIPPED | OUTPATIENT
Start: 2025-05-06

## 2025-05-11 NOTE — PROGRESS NOTES
HPI     Glaucoma            Comments: 3 month IOP ck and pt states Rocklatan makes eyes too red          Comments    DLS: 25    1. POAG OU  2. NS OU  3. Type 2 DM no DR    MEDS:  Brimonidine TID OU  Dorzolamide TID OU  RocklatanQHS OU - eyes are red - wants to get off it           Last edited by Supriya Simons MD on 2025  2:19 PM.            Assessment /Plan     For exam results, see Encounter Report.    Primary open angle glaucoma (POAG) of both eyes, mild stage  -     latanoprost 0.005 % ophthalmic solution; Place 1 drop into both eyes every evening.  Dispense: 7.5 mL; Refill: 3    Cataract, nuclear sclerotic, both eyes    Type 2 diabetes mellitus without ophthalmic manifestations        1. Patient followed by Dr. Balwinder Gregg for many years. - post Mary   Pre-Mary - was seeing dr Nadine Avila   Seen by Dr. Kebede in . Seen by Dr. Hensley (retina) for ? Mild armd  in . - taking AREDs but only 1 x day - is to F/U w/ her yearly        Glaucoma (type and duration)    Primary open angle glaucoma OU. Mild.    First HVF    (outside records - fullish)    First photos   10/2021    Treatment / Drops started   About            Family history    Mother  since . H/o glaucoma surgery/drops.         Glaucoma meds    Dorzolamide TID OU. // brimonidine tid ou  (simbrinza was too expensive) /  latanoprost  QHS OU.         H/O adverse rxn to glaucoma drops:   H/o bundle branch block. Taken off of beta blockers. // intol rocklatan         LASERS    None        GLAUCOMA SURGERIES    None        OTHER EYE SURGERIES    None        CDR    0.75/0.8        Tbase    ?? Off gtts           Tmax    - on gtts           Ttarget    18 ou - may need to be lower              HVF   - many old outside test - pt brought in - all near normal - Dr Balwinder Gregg     4 test  to   - SNS  / INS (+prog) od // near full os        Gonio    +3 ou         CCT    505/ 507         OCT    4 test  to  2025 - RNFL - dec G/TI/NS , bord NI od (?prog od) // bord TI  os (stable os)         Disc photos    10/18/21 with Dr. Simons    - Lisandro    18/18    (target 18 ou)   - Test done today    IOP -    2, NS ou - mild monitor     3. Diabetes    No BDR     Plan:   Continue dorzolamide TID OU and Travatan QHS OU.  Avoid BB's - heart block   Monitor HVF's / and IOP and OCT going forward - to monitor for progression   + SNS od // ? INS os     Would be difficult to do slt - pt squeezes a lot with gonio     Pt records were given back to her - they have not been scanned into Epic - (10/18/2021)    But the important data has been - reviewed and abstracted     5/12/2025   Just at target ou   ? OCT and VF prog od // ? VF porog os   Cont brimonidine   Cont dorzolamide   Cont latanoprost ou (( intol to [facility])     Consider SLT - difficult gonio - squeezes     Sees outside retina doctor - Brenda Azar MD- drusen of macula os - monitor    F/U 4 months - IOP / gonio /  might - be able to do slt - but does squeeze a lot with gonio

## 2025-05-12 ENCOUNTER — OFFICE VISIT (OUTPATIENT)
Dept: OPHTHALMOLOGY | Facility: CLINIC | Age: 75
End: 2025-05-12
Payer: MEDICARE

## 2025-05-12 DIAGNOSIS — H25.13 CATARACT, NUCLEAR SCLEROTIC, BOTH EYES: ICD-10-CM

## 2025-05-12 DIAGNOSIS — H40.1131 PRIMARY OPEN ANGLE GLAUCOMA (POAG) OF BOTH EYES, MILD STAGE: Primary | ICD-10-CM

## 2025-05-12 DIAGNOSIS — E11.9 TYPE 2 DIABETES MELLITUS WITHOUT OPHTHALMIC MANIFESTATIONS: ICD-10-CM

## 2025-05-12 PROCEDURE — 1101F PT FALLS ASSESS-DOCD LE1/YR: CPT | Mod: CPTII,HCNC,S$GLB, | Performed by: OPHTHALMOLOGY

## 2025-05-12 PROCEDURE — 99214 OFFICE O/P EST MOD 30 MIN: CPT | Mod: HCNC,S$GLB,, | Performed by: OPHTHALMOLOGY

## 2025-05-12 PROCEDURE — 3051F HG A1C>EQUAL 7.0%<8.0%: CPT | Mod: CPTII,HCNC,S$GLB, | Performed by: OPHTHALMOLOGY

## 2025-05-12 PROCEDURE — 3288F FALL RISK ASSESSMENT DOCD: CPT | Mod: CPTII,HCNC,S$GLB, | Performed by: OPHTHALMOLOGY

## 2025-05-12 PROCEDURE — 1160F RVW MEDS BY RX/DR IN RCRD: CPT | Mod: CPTII,HCNC,S$GLB, | Performed by: OPHTHALMOLOGY

## 2025-05-12 PROCEDURE — 1159F MED LIST DOCD IN RCRD: CPT | Mod: CPTII,HCNC,S$GLB, | Performed by: OPHTHALMOLOGY

## 2025-05-12 PROCEDURE — 99999 PR PBB SHADOW E&M-EST. PATIENT-LVL III: CPT | Mod: PBBFAC,HCNC,, | Performed by: OPHTHALMOLOGY

## 2025-05-12 PROCEDURE — 1126F AMNT PAIN NOTED NONE PRSNT: CPT | Mod: CPTII,HCNC,S$GLB, | Performed by: OPHTHALMOLOGY

## 2025-05-12 PROCEDURE — 4010F ACE/ARB THERAPY RXD/TAKEN: CPT | Mod: CPTII,HCNC,S$GLB, | Performed by: OPHTHALMOLOGY

## 2025-05-12 RX ORDER — NETARSUDIL AND LATANOPROST OPHTHALMIC SOLUTION, 0.02%/0.005% .2; .05 MG/ML; MG/ML
1 SOLUTION/ DROPS OPHTHALMIC; TOPICAL DAILY
COMMUNITY
Start: 2025-03-27 | End: 2025-05-12

## 2025-05-12 RX ORDER — LATANOPROST 50 UG/ML
1 SOLUTION/ DROPS OPHTHALMIC NIGHTLY
Qty: 7.5 ML | Refills: 3 | Status: SHIPPED | OUTPATIENT
Start: 2025-05-12 | End: 2026-05-12

## 2025-05-12 RX ORDER — ORAL SEMAGLUTIDE 7 MG/1
7 TABLET ORAL DAILY
COMMUNITY

## 2025-05-18 DIAGNOSIS — I10 ESSENTIAL HYPERTENSION: ICD-10-CM

## 2025-05-18 RX ORDER — VALSARTAN 160 MG/1
160 TABLET ORAL 2 TIMES DAILY
Qty: 180 TABLET | Refills: 3 | Status: SHIPPED | OUTPATIENT
Start: 2025-05-18

## 2025-05-18 NOTE — TELEPHONE ENCOUNTER
No care due was identified.  [facility] Embedded Care Due Messages. Reference number: [id number].   5/18/2025 12:03:25 AM CDT

## 2025-05-18 NOTE — TELEPHONE ENCOUNTER
Refill Decision Note   Jennifer Vikijo-ann  is requesting a refill authorization.  Brief Assessment and Rationale for Refill:  Approve     Medication Therapy Plan:         Comments:     Note composed:4:56 PM 05/18/2025

## 2025-05-19 ENCOUNTER — OFFICE VISIT (OUTPATIENT)
Dept: SLEEP MEDICINE | Facility: CLINIC | Age: 75
End: 2025-05-19
Payer: MEDICARE

## 2025-05-19 VITALS
BODY MASS INDEX: 35.38 KG/M2 | DIASTOLIC BLOOD PRESSURE: 74 MMHG | HEIGHT: 64 IN | HEART RATE: 65 BPM | SYSTOLIC BLOOD PRESSURE: 145 MMHG | WEIGHT: 207.25 LBS

## 2025-05-19 DIAGNOSIS — R06.81 WITNESSED EPISODE OF APNEA: Primary | ICD-10-CM

## 2025-05-19 DIAGNOSIS — R06.83 SNORING: ICD-10-CM

## 2025-05-19 DIAGNOSIS — I10 ESSENTIAL HYPERTENSION: ICD-10-CM

## 2025-05-19 DIAGNOSIS — G47.30 SLEEP-DISORDERED BREATHING: ICD-10-CM

## 2025-05-19 PROCEDURE — 1101F PT FALLS ASSESS-DOCD LE1/YR: CPT | Mod: CPTII,HCNC,S$GLB, | Performed by: NURSE PRACTITIONER

## 2025-05-19 PROCEDURE — 1160F RVW MEDS BY RX/DR IN RCRD: CPT | Mod: CPTII,HCNC,S$GLB, | Performed by: NURSE PRACTITIONER

## 2025-05-19 PROCEDURE — 1126F AMNT PAIN NOTED NONE PRSNT: CPT | Mod: CPTII,HCNC,S$GLB, | Performed by: NURSE PRACTITIONER

## 2025-05-19 PROCEDURE — 3077F SYST BP >= 140 MM HG: CPT | Mod: CPTII,HCNC,S$GLB, | Performed by: NURSE PRACTITIONER

## 2025-05-19 PROCEDURE — 1159F MED LIST DOCD IN RCRD: CPT | Mod: CPTII,HCNC,S$GLB, | Performed by: NURSE PRACTITIONER

## 2025-05-19 PROCEDURE — 3288F FALL RISK ASSESSMENT DOCD: CPT | Mod: CPTII,HCNC,S$GLB, | Performed by: NURSE PRACTITIONER

## 2025-05-19 PROCEDURE — 99204 OFFICE O/P NEW MOD 45 MIN: CPT | Mod: HCNC,S$GLB,, | Performed by: NURSE PRACTITIONER

## 2025-05-19 PROCEDURE — 3008F BODY MASS INDEX DOCD: CPT | Mod: CPTII,HCNC,S$GLB, | Performed by: NURSE PRACTITIONER

## 2025-05-19 PROCEDURE — 3051F HG A1C>EQUAL 7.0%<8.0%: CPT | Mod: CPTII,HCNC,S$GLB, | Performed by: NURSE PRACTITIONER

## 2025-05-19 PROCEDURE — 3078F DIAST BP <80 MM HG: CPT | Mod: CPTII,HCNC,S$GLB, | Performed by: NURSE PRACTITIONER

## 2025-05-19 PROCEDURE — 99999 PR PBB SHADOW E&M-EST. PATIENT-LVL IV: CPT | Mod: PBBFAC,HCNC,, | Performed by: NURSE PRACTITIONER

## 2025-05-19 PROCEDURE — 4010F ACE/ARB THERAPY RXD/TAKEN: CPT | Mod: CPTII,HCNC,S$GLB, | Performed by: NURSE PRACTITIONER

## 2025-05-19 NOTE — PROGRESS NOTES
"Referred by Betsy Warner MD     NEW PATIENT VISIT    Jennifer Motley  is a pleasant 74 y.o. female who presents in the Spring of 2025 for sleep evaluation.    See assessment below for further history.    Past Medical History:   Diagnosis Date    Cataract     Diabetes mellitus type II, controlled     Glaucoma     Hypertension     Idiopathic ventricular tachycardia     Obese     Ocular hypertension     Overactive bladder     Thyroid cyst    Problem List[1]Current Medications[2]      Vitals:    05/19/25 1057   BP: (!) 145/74   Pulse: 65   Weight: 94 kg (207 lb 3.7 oz)   Height: 5' 4" (1.626 m)     Physical Exam:    GEN:   Well-appearing  Psych:  Appropriate affect, demonstrates insight  SKIN:  No rash on the face or bridge of the nose      LABS:   Lab Results   Component Value Date    CO2 25 04/07/2025         No echocardiogram results found for the past 12 months     Lab Results   Component Value Date    FERRITIN 93 09/08/2023       RECORDS REVIEWED:      ASSESSMENT    Sig PMH: Ocular HTN, idiopathic Vtach, HTN, OAB, BMI 35-39.9, T2DM, Vit D deficiency  PROBLEM DESCRIPTION/ Sx on Presentation  STATUS PLAN     Screening for  LEFTY   Presentation:     Had sleep study in 2002; reports that a surgery was recommended "removal of a flap in the back of the throat" - but insurance denied it because it was "cosmetic".  Was seen at [facility], study done at [facility].    LOV: Dat 5.18.21 - initial visit for snoring. HST ordered    Recently was on a girl's trips    yes - snoring  no - gasping arousals/coughing  yes - witnessed apneas, pauses in breathing    no - night sweats    occasional - AM headaches    no - dry mouth/sore throat      new   -we discussed sleep testing to evaluate for LEFTY     -discussed possible treatments for LEFTY including CPAP therapy       Daytime symptoms       Typically feels refreshed when waking up in the morning.    ESS 5/24 on intake      new   -will reassess sleepiness after evaluation for LEFTY "     Insomnia       no - difficulty with sleep onset    no - difficulty with sleep maintenance      SLEEP SCHEDULE   Duration    Wind- down    Envmnt    CBTi    Meds prior    Meds now    Bed Time 1030-1130PM   Lights out    Latency 30 min   Arousals 1-2   Back to sleep 20 min   Avg TST    Wake time 630-730AM   Caffeine    Naps 2x/week x 1 hr, refreshing   Nocturia 1-2   Work                  new   -will reassess after evaluation for sleep-disordered breathing       Nocturia     x 1-2 per sleep period    new          RTC:  will arrange RTC depending on results of sleep testing         PLAN      -recommend sleep testing   -PSG ordered  -discussed trial therapy if LEFTY present and the patient is open to a trial of CPAP therapy  -discussed the etiology of obstructive sleep apnea as well as the potential ramifications of untreated sleep apnea, which could include daytime sleepiness, hypertension, heart disease and/or stroke. We discussed potential treatment options, which could include weight loss, body positioning, continuous positive airway pressure (CPAP), oral appliance, Inspire, or referral for surgical consideration.        Advised on plan of care. Answered all patient questions. Patient verbalized understanding and voiced agreement with plan of care.                         [1]   Patient Active Problem List  Diagnosis    Thyroid cyst    Overactive bladder    Ocular hypertension    Essential hypertension    Idiopathic ventricular tachycardia    Ventricular tachycardia    Family history of ovarian cancer    [place] cardiac risk >20% in next 10 years    Osteopenia    Type 2 diabetes mellitus with diabetic polyneuropathy, unspecified whether long term insulin use    Severe obesity (BMI 35.0-39.9) with comorbidity    Vitamin D deficiency    Newly recognized heart murmur    Statin declined    B12 deficiency   [2]   Current Outpatient Medications:     aspirin (ECOTRIN) 81 MG EC tablet, Take 1 tablet (81 mg total) by  mouth once daily., Disp: 90 tablet, Rfl: 3    B-complex with vitamin C (SUPER B COMPLEX-VITAMIN C) tablet, Take 1 tablet by mouth once daily., Disp: 90 tablet, Rfl: 3    brimonidine 0.2% (ALPHAGAN) 0.2 % Drop, Place 1 drop into both eyes 3 (three) times daily., Disp: 30 mL, Rfl: 3    calcium-vitamin D3 500 mg(1,250mg) -200 unit per tablet, Take 1 tablet by mouth once daily., Disp: , Rfl:     cranberry 500 mg Cap, Take 1 capsule by mouth once daily., Disp: 90 capsule, Rfl: 3    dorzolamide (TRUSOPT) 2 % ophthalmic solution, Place 1 drop into both eyes 3 (three) times daily., Disp: 30 mL, Rfl: 3    FLAXSEED ORAL, Take 1 tablet by mouth once daily at 6am. , Disp: , Rfl:     hydroCHLOROthiazide (HYDRODIURIL) 25 MG tablet, TAKE 1 TABLET ONE TIME DAILY, Disp: 90 tablet, Rfl: 1    latanoprost 0.005 % ophthalmic solution, Place 1 drop into both eyes every evening., Disp: 7.5 mL, Rfl: 3    metFORMIN (GLUCOPHAGE) 500 MG tablet, Take 1 tablet (500 mg total) by mouth daily with breakfast., Disp: 90 tablet, Rfl: 3    multivitamin capsule, Take 1 capsule by mouth once daily., Disp: , Rfl:     omega-3 fatty acids/fish oil (FISH OIL-OMEGA-3 FATTY ACIDS) 300-1,000 mg capsule, Take by mouth once daily., Disp: , Rfl:     rosuvastatin (CRESTOR) 5 MG tablet, Take 5 mg by mouth once daily., Disp: , Rfl:     semaglutide (RYBELSUS) 7 mg tablet, Take 7 mg by mouth once daily., Disp: , Rfl:     turmeric root extract 500 mg Cap, Take 1 each by mouth once daily., Disp: 90 capsule, Rfl: 3    valsartan (DIOVAN) 160 MG tablet, Take 1 tablet (160 mg total) by mouth 2 (two) times daily., Disp: 180 tablet, Rfl: 3    verapamiL (CALAN-SR) 180 MG CR tablet, TAKE 1 TABLET EVERY DAY, Disp: 90 tablet, Rfl: 3    Current Facility-Administered Medications:     dexAMETHasone injection 8 mg, 8 mg, Intramuscular, 1 time in Clinic/HOD,

## 2025-05-22 ENCOUNTER — TELEPHONE (OUTPATIENT)
Dept: SLEEP MEDICINE | Facility: OTHER | Age: 75
End: 2025-05-22
Payer: MEDICARE

## 2025-06-04 DIAGNOSIS — I10 ESSENTIAL HYPERTENSION: ICD-10-CM

## 2025-06-04 RX ORDER — HYDROCHLOROTHIAZIDE 25 MG/1
25 TABLET ORAL DAILY
Qty: 90 TABLET | Refills: 3 | Status: SHIPPED | OUTPATIENT
Start: 2025-06-04

## 2025-06-11 ENCOUNTER — HOSPITAL ENCOUNTER (OUTPATIENT)
Dept: SLEEP MEDICINE | Facility: OTHER | Age: 75
Discharge: HOME | End: 2025-06-11
Attending: NURSE PRACTITIONER
Payer: MEDICARE

## 2025-06-11 DIAGNOSIS — G47.30 SLEEP-DISORDERED BREATHING: ICD-10-CM

## 2025-06-11 DIAGNOSIS — I10 ESSENTIAL HYPERTENSION: ICD-10-CM

## 2025-06-11 DIAGNOSIS — R06.83 SNORING: ICD-10-CM

## 2025-06-11 DIAGNOSIS — R06.81 WITNESSED EPISODE OF APNEA: ICD-10-CM

## 2025-06-11 PROCEDURE — 95811 POLYSOM 6/>YRS CPAP 4/> PARM: CPT

## 2025-06-12 NOTE — PROCEDURES
"  [facility]    Split-Night Study Interpretation Report    Patient Name:  Jennifer Motley  MRN#:  [id number]  :  1950  Study Date:  2025  Referring Provider:  Chhaya Rodriguez    Indications for Polysomnography:  The patient is a 74 year old - who is 5' 4" and weighs 207.0 lbs.  His/Her BMI equals 35.8.  [place] was - and Neck Circumference was -.  A diagnostic polysomnogram was performed to evaluate for -.  After 180.0 minutes of sleep time the patient exhibited sufficient respiratory events qualifying him/her for a PAP trial which was then initiated.     Polysomnogram Data:  A full night polysomnogram was performed recording the standard physiologic parameters including EEG, EOG, EMG, EKG, nasal and oral airflow.  Respiratory parameters of chest and abdominal movements are recorded with (RIP) Respiratory Inductance Plethsmography.  Oxygen saturation was recorded by pulse oximetry.    Sleep Architecture:  The total recording time of the diagnostic portion of the study was 223.6 minutes.  The total sleep time was 180.0 minutes.  The patient spent 8.6% of total sleep time in Stage N1, 58.6% in Stage N2, 21.4% in Stages N3, and 11.4% in REM.  Sleep latency was 4.1 minutes.  REM latency was 158.0 minutes.  Sleep Efficiency was 80.5%.  Total wake time was 44.0 minutes for a total wake percentage of 12.0%.  Wake after Sleep Onset was 39.5 minutes.     At 01:54:53 AM the patient was placed on PAP treatment and was titrated at pressures ranging from 5* cm/H20 up to 10* cm/H20.  The total recording time of the treatment portion of the study was 204.8 minutes.  The total sleep time was 173.5 minutes.  During the treatment portion of the study, the patient spent 5.8% of total sleep time in Stage N1, 41.5% in Stage N2, 30.3% in Stages N3, and 22.5% in REM.  Sleep latency was 23.5 minutes.  REM latency was 38.0 minutes.  Sleep Efficiency was 84.7%.  Total wake time was 31.5 minutes for a total wake " percentage of 15.4%.  Wake after Sleep Onset was 7.5 minutes.     Respiratory Summary:  During the diagnostic portion of the study, the polysomnogram revealed a presence of - obstructive, - central, and - mixed apneas resulting in an Apnea index of - events per hour.  There were 60 hypopneas resulting in a Hypopnea index of 20.0 events per hour.  The combined Apnea/Hypopnea index was 20.0 events per hour.  There were a total of 3 RERA events resulting in a Respiratory Disturbance Index (RDI) of 21.0 events per hour.  Lowest oxygen saturation was 81.0% and time spent <=88% oxygen saturation was 5.9 minutes (2.8%).    During diagnostic portion [place] CO2 during sleep ranged from 20.2 to 45.4 mmHg, was greater than 50 mmHg for - minutes and greater than 55 mmHg for - minutes.  Transcutaneous CO2 during sleep ranged from 36.8 to 47.8 mmHg, was greater than 50 mmHg for - minutes and greater than 55 mmHg for - minutes.    During the treatment portion of the study, the polysomnogram revealed a presence of - obstructive, - central, and - mixed apneas resulting in an Apnea index of - events per hour.  There were 3 hypopneas resulting in a Hypopnea index of 1.0 events per hour.  The combined Apnea/Hypopnea index was 1.0 events per hour.  There were a total of 2 RERA events resulting in a Respiratory Disturbance Index (RDI) of 1.7 events per hour.  Lowest oxygen saturation was 86.0% and time spent <=88% oxygen saturation was 0.2 minutes (0.1%).    During treatment portion Transcutaneous CO2 during sleep ranged from 43.6 to 50.6 mmHg, was greater than 50 mmHg for 1.0 minutes and greater than 55 mmHg for - minutes.    Limb Movement Activity:  During the diagnostic portion of the study, there were 32 limb movements recorded.  Of this total, 32 were classified as PLMs.  Of the PLMs, 2 were associated with arousals.  The Limb Movement index was 10.7 per hour while the PLM index was 10.7 per hour and PLM with arousals index was  "0.7 per hour.    Cardiac: single lead EKG revealed normal sinus rhythm with frequent PACs    PAP titration:  Mask:   Large [place] FFM  CPAP = 5 cwp was partially effective in lateral position in stage N2 sleep  CPAP = 8 cwp was largely effective in lateral position in stage REM sleep  CPAP = 8 cwp was effective in supine position in stage N2 sleep  CPAP = 10 cwp was largely effective in supine position in stage REM sleep     The mask used in this study worked well  The patients response to the study:   'I felt like I didn't sleep"      Oxygenation:  Hypoxemia was only observed in relation to obstructive events.    Impression:  -obstructive sleep apnea significantly worse in stage REM sleep (REM AHI=79 vs non-REM AHI =12)  -Sleep-related hypoxemia (G47.36) likely secondary to LEFTY  -increased amounts of stage REM sleep at effective CPAP pressures    Recommendations:    -initial auto-CPAP settings CPAP min = 8 cwp  and CPAP max =  10 cwp   -the patient has follow up with Sleep Medicine        Jayro Daugherty MD  (This Sleep Study was interpreted by a Board Certified Sleep Specialist who conducted an epoch-by-epoch review of the entire raw data recording.)  (The indication for this sleep study was reviewed and deemed appropriate by AASM Practice Parameters or other reasons by a Board Certified Sleep Specialist.)        [facility]     Split-Night Report     Patient Name: Jennifer Motley Study Date: 6/11/2025   YOB: 1950 MRN #: [id number]   Age: 74 year JAVIER #: -   Sex: - Referring Provider: Chhaya Rodriguez   Height: 5' 4" Recording Tech: Ander Bronson RPSGT   Weight: 207.0 lbs Scoring Tech: Samuel Roberson RRT RPSGT   BMI: 35.8 Interpreting Physician: -   ESS: - Neck Circumference: -   Study Overview     DIAGNOSTIC TREATMENT   Lights Off: 10:11:02 PM Lights Off: 01:54:37 AM   Lights On: 01:54:37 AM Lights On: 05:19:27 AM   Time in Bed: 223.6 min. Time in Bed: 204.8 min.   Total Sleep Time: 180.0 " min. Total Sleep Time: 173.5 min.   Sleep Efficiency: 80.5% Sleep Efficiency: 84.7%   Sleep Latency: 4.1 min. Sleep Latency: 23.5 min.   REM Latency from Sleep Onset: 158.0 min. REM Latency from Sleep Onset: 38.0 min.   Wake After Sleep Onset: 39.5 min. Wake After Sleep Onset: 7.5 min.      DIAGNOSTIC TREATMENT     Count Index   Count Index   Awakenings: 17 5.7 Awakenings: 10 3.5   Arousals: 38 12.7 Arousals: 12 4.1   Apneas & Hypopneas: 60 20.0 Apneas & Hypopneas: 3 1.0    Limb Movements: 32 10.7 Limb Movements: 9 3.1   Snores: - - Snores: - -   Desaturations: 46 15.3 Desaturations: 4 1.4   Minimum SpO2 TST: 81.0% Minimum SpO2 TST: 86.0%          Sleep Architecture       DIAGNOSTIC TREATMENT ENTIRE NIGHT   Stages Time (min) % TST Time (min) % TST Time (min) % TST   WAKE 44.0   31.5   75.5     Stage N1 15.5 8.6% 10.0 5.8% 25.5 7.2%   Stage N2 105.5 58.6% 72.0 41.5% 177.5 50.2%   Stage N3 38.5 21.4% 52.5 30.3% 91.0 25.7%   REM 20.5 11.4% 39.0 22.5% 59.5 16.8%         Arousal Summary       DIAGNOSTIC TREATMENT     NREM REM TST Index NREM REM TST Index   Respiratory Arousals 2 2 4 1.3 2 1 3 1.0   PLM Arousals 2 - 2 0.7 - - - -   Isolated LM Arousals - - - - - - - -   Spontaneous Arousals 32 - 32 10.7 9 - 9 3.1   Total 36 2 38 12.7 11 1 12 4.1       Respiratory Summary     DIAGNOSTIC By Sleep Stage By Body Position Total    NREM REM Supine Non-Supine    Time (min) 159.5 20.5 180.0 - 180.0                 Obstructive Apnea - - - - -   Mixed Apnea - - - - -   Central Apnea - - - - -   Central Apnea Index - - - - -   Total Apneas - - - - -   Total Apnea Index - - - - -                 Total Hypopnea 33 27 60 - 60   Total Hypopnea Index 12.4 79.0 20.0 - 20.0                 Apnea & Hypopnea 33 27 60 - 60   Apnea & Hypopnea Index 12.4 79.0 20.0 - 20.0                 RERAs 3 - 3 - 3   RERA Index 1.1 - 1.0 - 1.0                 RDI 13.5 79.0 21.0 - 21.0      TREATMENT By Sleep Stage By Body Position Total    NREM REM Supine  Non-Supine    Time (min) 134.5 39.0 119.5 54.0 173.5                 Obstructive Apnea - - - - -   Mixed Apnea - - - - -   Central Apnea - - - - -   Central Apnea Index - - - - -   Total Apneas - - - - -   Total Apnea Index - - - - -                 Total Hypopnea 3 - 3 - 3   Total Hypopnea Index 1.3 - 1.5 - 1.0                 Apnea & Hypopnea 3 - 3 - 3   Apnea & Hypopnea Index 1.3 - 1.5 - 1.0                 RERAs 1 1 1 1 2   RERA Index 0.4 1.5 0.5 1.1 0.7                 RDI 1.8 1.5 2.0 1.1 1.7      Scoring Criteria: Hypopneas scored at 4% desaturation criteria.     Respiratory Event Durations       DIAGNOSTIC TREATMENT   Apnea NREM REM NREM REM   Average (seconds) - - - -   Maximum (seconds) - - - -   Hypopnea           Average (seconds) 14.6 16.4 14.3 -   Maximum (seconds) 24.7 29.4 15.2 -       Oxygen Saturation Summary       DIAGNOSTIC TREATMENT     Wake NREM REM TST Wake NREM REM TST   Average SpO2 95.9% 94.3% 92.8% 94.1% 96.7% 94.2% 94.6% 94.3%   Minimum SpO2 82.0% 81.0% 81.0% 81.0%  93.0% 86.0% 91.0% 86.0%    Maximum SpO2 99.0% 98.0% 98.0% 98.0%  99.0% 98.0% 97.0% 98.0%      DIAGNOSTIC   Oxygen Saturation Distribution     Range (%) Time in range (min) Time in range (%)   90.0 - 100.0 170.3 94.6%   80.0 - 90.0 9.8 5.4%   70.0 - 80.0 - -   60.0 - 70.0 - -   50.0 - 60.0 - -   0.0 - 50.0 - -   Time Spent <=88% SpO2     Range (%) Time in range (min) Time in range (%)   0.0 - 88.0 5.4 3.0%              Count Index   Desaturations 46 15.3    TREATMENT   Oxygen Saturation Distribution     Range (%) Time in range (min) Time in range (%)   90.0 - 100.0 204.5 99.9%   80.0 - 90.0 0.3 0.1%   70.0 - 80.0 - -   60.0 - 70.0 - -   50.0 - 60.0 - -   0.0 - 50.0 - -   Time Spent <=88% SpO2     Range (%) Time in range (min) Time in range (%)   0.0 - 88.0 0.2 0.1%        Count Index   Desaturations 4 1.4       Limb Movement Summary       DIAGNOSTIC TREATMENT     Count Index Count Index   Isolated Limb Movements - - - -    Periodic Limb Movements (PLMs) 32 10.7 9 3.1   Total Limb Movements 32 10.7 9 3.1      Cardiac Summary       DIAGNOSTIC TREATMENT     Wake NREM REM Total Wake NREM REM Total   Average W[place] (BPM) 70.7 68.7 65.9 68.7 65.2 63.5 62.1 63.5   Minimum WY (BPM) 52.0 51.0 55.0 51.0 52.0 52.0 54.0 52.0   Maximum [place] (BPM) 92.0 86.0 77.0 92.0 76.0 80.0 75.0 80.0             Diagnostic EtCO2     Stage Min (mmHg) Average (mmHg) Max (mmHg)   Wake 20.3 37.8 76.7   NREM(1+2+3) 20.2 34.8 44.5   REM 21.2 38.4 45.4         Range (mmHg) Time in range (min) Time in range (%)   20.0 - 40.0 123.9 55.3%   40.0 - 50.0 31.0 13.8%   50.0 - 55.0 - -   55.0 - 100.0 0.0 0.0%   Excluded data <20.0 & >100.0 69.1 30.8%         TcCO2 Summary     DIAGNOSTIC     Stage Min (mmHg) Average (mmHg) Max (mmHg)   Wake 36.3 42.7 47.4   NREM(1+2+3) 36.8 43.7 47.8   REM 46.0 46.9 47.8         Range (mmHg) Time in range (min) Time in range (%)   20.0 - 40.0 7.7 3.4%   40.0 - 50.0 204.9 91.5%   50.0 - 55.0 - -   55.0 - 100.0 - -   Excluded data <20.0 & >100.0 11.4 5.1%         TREATMENT     Stage Min (mmHg) Average (mmHg) Max (mmHg)   Wake 43.2 44.4 45.8   NREM(1+2+3) 43.6 45.5 50.6   REM 44.0 44.8 45.5         Range (mmHg) Time in range (min) Time in range (%)   20.0 - 40.0 - -   40.0 - 50.0 204.0 99.5%   50.0 - 55.0 1.0 0.5%   55.0 - 100.0 - -   Excluded data <20.0 & >100.0 - -         Comments     -            Titration Summary     PAP Device PAP Level O2 Level Time (min) TST (min) NREM (min) REM (min) Wake (min) Sleep Eff% OA# CA# MA# Hyp# AHI RERA RDI Min SpO2 SpO2 <=88% (min) Ar. Index   - Off - 224.0 180.0 159.5 20.5 44.0 80.4% - - - 60 20.0 3 21.0 81.0  5.4 12.7   CPAP 5 - 48.0 23.0 23.0 0.0 25.0 47.9% - - - - - 1 2.6 95.0  0.0 5.2   CPAP 6 - 10.0 10.0 10.0 0.0 0.0 100.0% - - - - - - - 95.0  0.0 6.0   CPAP 7 - 5.5 5.5 3.5 2.0 0.0 100.0% - - - - - - - 94.0  0.0 -   CPAP 8 - 113.0 110.5 93.0 17.5 2.5 97.8% - - - 1 0.5 - 0.5 92.0  0.0 0.5   CPAP 9 - 6.0 6.0  0.0 6.0 0.0 100.0% - - - - - 1 10.0 91.0  0.0 10.0   CPAP 10 - 22.5 18.5 5.0 13.5 4.0 82.2% - - - 2 6.5 - 6.5 86.0  0.2 22.7

## 2025-06-13 PROCEDURE — 95811 POLYSOM 6/>YRS CPAP 4/> PARM: CPT | Mod: 26,,, | Performed by: INTERNAL MEDICINE

## 2025-06-16 ENCOUNTER — RESULTS FOLLOW-UP (OUTPATIENT)
Dept: SLEEP MEDICINE | Facility: CLINIC | Age: 75
End: 2025-06-16

## 2025-06-18 ENCOUNTER — OFFICE VISIT (OUTPATIENT)
Dept: URGENT CARE | Facility: CLINIC | Age: 75
End: 2025-06-18
Payer: MEDICARE

## 2025-06-18 VITALS
TEMPERATURE: 97 F | BODY MASS INDEX: 34.31 KG/M2 | OXYGEN SATURATION: 98 % | RESPIRATION RATE: 18 BRPM | SYSTOLIC BLOOD PRESSURE: 140 MMHG | HEIGHT: 64 IN | WEIGHT: 201 LBS | HEART RATE: 75 BPM | DIASTOLIC BLOOD PRESSURE: 69 MMHG

## 2025-06-18 DIAGNOSIS — H11.31 SUBCONJUNCTIVAL HEMORRHAGE OF RIGHT EYE: Primary | ICD-10-CM

## 2025-06-18 PROCEDURE — 99213 OFFICE O/P EST LOW 20 MIN: CPT | Mod: S$GLB,,, | Performed by: NURSE PRACTITIONER

## 2025-06-18 NOTE — PROGRESS NOTES
"Subjective:      Patient ID: Jennifer Motley is a 74 y.o. female.    Vitals:  height is 5' 4" (1.626 m) and weight is 91.2 kg (201 lb). Her tympanic temperature is 97.4 °F (36.3 °C). Her blood pressure is 140/69 (abnormal) and her pulse is 75. Her respiration is 18 and oxygen saturation is 98%.     Chief Complaint: Eye Problem    73 y/o female c/o with eye irritation. Patient states on yesterday morning and her right eye was red but just on the corner. Patient states she took OTC eye drops. Patient states she has pain on right side of her head.     74-year-old female presents to clinic with complaints of right eye redness treating with OTC eye drops. Denies eyelash matting, eye drainage, eye itching or foreign body sensation. Reports recent coughing episodes. 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 Mediation profile consists of Brimonidine 0.2%, Latanoprost 0.005%, and Dorzolamide 2%. Last ophthalmology visit 5/12/25 requested to discontinue Rocklatan qhs to OU due to making her eyes redness f/u appointment in 4 months     Eye Problem   The right eye is affected. This is a new problem. The current episode started yesterday. The problem occurs constantly. The problem has been gradually worsening. The patient is experiencing no pain. She Does not wear contacts. Associated symptoms include eye redness. Pertinent negatives include no blurred vision, eye discharge, double vision, fever, foreign body sensation, itching, nausea, photophobia, recent URI or vomiting. She has tried commercial eye wash for the symptoms. The treatment provided no relief.       Constitution: Negative for fever.   Eyes:  Positive for eye redness. Negative for eye trauma, foreign body in eye, eye discharge, eye itching, eye pain, photophobia, vision loss, double vision, blurred vision and eyelid swelling.   Gastrointestinal:  Negative for nausea and vomiting.      Objective:     Physical Exam "   Constitutional: She is oriented to person, place, and time. She appears well-developed.   HENT:   Head: Normocephalic and atraumatic.   Ears:   Right Ear: External ear normal.   Left Ear: External ear normal.   Nose: Nose normal.   Mouth/Throat: Oropharynx is clear and moist.   Eyes: Conjunctivae, EOM and lids are normal. Pupils are equal, round, and reactive to light. Right eye exhibits no discharge and no hordeolum. No foreign body present in the right eye.     Extraocular movement intact gaze aligned appropriately   Neck: Trachea normal and phonation normal. Neck supple.   Cardiovascular: Normal rate.   Pulmonary/Chest: Effort normal.   Musculoskeletal: Normal range of motion.         General: Normal range of motion.   Neurological: She is alert and oriented to person, place, and time.   Skin: Skin is warm, dry and intact.   Psychiatric: Her speech is normal and behavior is normal. Judgment and thought content normal.   Nursing note and vitals reviewed.      Assessment:     1. Subconjunctival hemorrhage of right eye        Plan:       Subconjunctival hemorrhage of right eye      Patient Instructions   In most cases, specific treatment is not needed. A subconjunctival hemorrhage can look scary. But the blood will usually absorb back into your eye within 1 to 2 weeks. The area might change colors as it heals. For example, it might turn yellow.     Please return here or go to the Emergency Department for any concerns or worsening of condition.    Please follow up with your primary care doctor or eye specialist as needed.    If you  smoke, please stop smoking.

## 2025-06-18 NOTE — PATIENT INSTRUCTIONS
In most cases, specific treatment is not needed. A subconjunctival hemorrhage can look scary. But the blood will usually absorb back into your eye within 1 to 2 weeks. The area might change colors as it heals. For example, it might turn yellow.     Please return here or go to the Emergency Department for any concerns or worsening of condition.    Please follow up with your primary care doctor or eye specialist as needed.    If you  smoke, please stop smoking.

## 2025-08-04 ENCOUNTER — PATIENT MESSAGE (OUTPATIENT)
Dept: ADMINISTRATIVE | Facility: HOSPITAL | Age: 75
End: 2025-08-04
Payer: MEDICARE

## (undated) DEVICE — DRAPE OPTIMA MAJOR PEDIATRIC

## (undated) DEVICE — ELECTRODE REM PLYHSV RETURN 9

## (undated) DEVICE — ADHESIVE DERMABOND ADVANCED

## (undated) DEVICE — DRESSING AQUACEL FOAM 3 X 3